# Patient Record
Sex: FEMALE | Race: WHITE | NOT HISPANIC OR LATINO
[De-identification: names, ages, dates, MRNs, and addresses within clinical notes are randomized per-mention and may not be internally consistent; named-entity substitution may affect disease eponyms.]

---

## 2020-08-11 PROBLEM — Z00.00 ENCOUNTER FOR PREVENTIVE HEALTH EXAMINATION: Status: ACTIVE | Noted: 2020-08-11

## 2020-08-19 ENCOUNTER — APPOINTMENT (OUTPATIENT)
Dept: UROLOGY | Facility: CLINIC | Age: 65
End: 2020-08-19
Payer: MEDICARE

## 2020-08-19 VITALS
TEMPERATURE: 97.3 F | HEIGHT: 66.5 IN | SYSTOLIC BLOOD PRESSURE: 133 MMHG | HEART RATE: 77 BPM | BODY MASS INDEX: 29.38 KG/M2 | OXYGEN SATURATION: 96 % | DIASTOLIC BLOOD PRESSURE: 90 MMHG | WEIGHT: 185 LBS

## 2020-08-19 DIAGNOSIS — R33.9 RETENTION OF URINE, UNSPECIFIED: ICD-10-CM

## 2020-08-19 DIAGNOSIS — Z78.9 OTHER SPECIFIED HEALTH STATUS: ICD-10-CM

## 2020-08-19 LAB
BILIRUB UR QL STRIP: NEGATIVE
CLARITY UR: CLEAR
COLLECTION METHOD: NORMAL
GLUCOSE UR-MCNC: NEGATIVE
HCG UR QL: 0.2 EU/DL
HGB UR QL STRIP.AUTO: NORMAL
KETONES UR-MCNC: NEGATIVE
LEUKOCYTE ESTERASE UR QL STRIP: NEGATIVE
NITRITE UR QL STRIP: NEGATIVE
PH UR STRIP: 5.5
PROT UR STRIP-MCNC: NEGATIVE
SP GR UR STRIP: 1.02

## 2020-08-19 PROCEDURE — 81003 URINALYSIS AUTO W/O SCOPE: CPT | Mod: QW

## 2020-08-19 PROCEDURE — 51798 US URINE CAPACITY MEASURE: CPT

## 2020-08-19 PROCEDURE — 51701 INSERT BLADDER CATHETER: CPT

## 2020-08-19 PROCEDURE — 99204 OFFICE O/P NEW MOD 45 MIN: CPT | Mod: 25

## 2020-08-20 LAB
APPEARANCE: CLEAR
BACTERIA: NEGATIVE
BILIRUBIN URINE: NEGATIVE
BLOOD URINE: NEGATIVE
COLOR: NORMAL
GLUCOSE QUALITATIVE U: NEGATIVE
HYALINE CASTS: 0 /LPF
KETONES URINE: NEGATIVE
LEUKOCYTE ESTERASE URINE: NEGATIVE
MICROSCOPIC-UA: NORMAL
NITRITE URINE: NEGATIVE
PH URINE: 6
PROTEIN URINE: NEGATIVE
RED BLOOD CELLS URINE: 3 /HPF
SPECIFIC GRAVITY URINE: 1.02
SQUAMOUS EPITHELIAL CELLS: 1 /HPF
UROBILINOGEN URINE: NORMAL
WHITE BLOOD CELLS URINE: 1 /HPF

## 2020-08-21 LAB — BACTERIA UR CULT: NORMAL

## 2020-08-24 ENCOUNTER — NON-APPOINTMENT (OUTPATIENT)
Age: 65
End: 2020-08-24

## 2020-08-24 ENCOUNTER — APPOINTMENT (OUTPATIENT)
Dept: OBGYN | Facility: CLINIC | Age: 65
End: 2020-08-24
Payer: MEDICARE

## 2020-08-24 DIAGNOSIS — R19.00 INTRA-ABDOMINAL AND PELVIC SWELLING, MASS AND LUMP, UNSPECIFIED SITE: ICD-10-CM

## 2020-08-24 PROCEDURE — 99203 OFFICE O/P NEW LOW 30 MIN: CPT

## 2020-08-24 NOTE — PHYSICAL EXAM
[Awake] : awake [Alert] : alert [Acute Distress] : no acute distress [Nipple Discharge] : no nipple discharge [Mass] : no breast mass [Soft] : soft [Axillary LAD] : no axillary lymphadenopathy [Tender] : non tender [Oriented x3] : oriented to person, place, and time [Normal] : vagina [No Bleeding] : there was no active vaginal bleeding [No Tenderness] : no rectal tenderness [Uterine Adnexae] : was normal on the left [___] : a [unfilled] ~Ucm mass was palpated in the right adnexa [Occult Blood] : occult blood test from digital rectal exam was negative [Nl Sphincter Tone] : normal sphincter tone [FreeTextEntry7] : pelvic exam reveals a large rightcystic adnexal mass mobilenontender

## 2020-08-24 NOTE — CHIEF COMPLAINT
[Initial Visit] : initial GYN visit [FreeTextEntry1] : Patient is age 64-year-old female who presents for an urgent visit. Patient was seen by urologist for urinary symptoms and underwent a pelvic ultrasound in the office which revealed a cystic pelvic mass. Patient had a CAT scan and a CAT scan revealed a right sided pelvic cystic mass measuring 14.3 x 9 x 10.3 cm with the suggestion of a thin septation. Otherwise within normal limits. Discussed this finding with the patient and her daughter. Advised a complete exam today and to have a pelvic MRI and ova1 blood test and then determine further treatment pending these test results. Discussed the issue of possible malignancy. However other than the urinary symptoms the patient is asymptomatic. Patient denies any abnormal vaginal bleeding or discharge. Patient's last gynecologic exam was 2 years ago. Patient's last mammogram one year ago.

## 2020-08-25 ENCOUNTER — TRANSCRIPTION ENCOUNTER (OUTPATIENT)
Age: 65
End: 2020-08-25

## 2020-08-25 ENCOUNTER — OUTPATIENT (OUTPATIENT)
Dept: OUTPATIENT SERVICES | Facility: HOSPITAL | Age: 65
LOS: 1 days | End: 2020-08-25
Payer: MEDICARE

## 2020-08-25 ENCOUNTER — APPOINTMENT (OUTPATIENT)
Dept: MRI IMAGING | Facility: CLINIC | Age: 65
End: 2020-08-25
Payer: MEDICARE

## 2020-08-25 DIAGNOSIS — R19.00 INTRA-ABDOMINAL AND PELVIC SWELLING, MASS AND LUMP, UNSPECIFIED SITE: ICD-10-CM

## 2020-08-25 LAB
ANION GAP SERPL CALC-SCNC: 17 MMOL/L
BASOPHILS # BLD AUTO: 0.05 K/UL
BASOPHILS NFR BLD AUTO: 0.7 %
BUN SERPL-MCNC: 25 MG/DL
CALCIUM SERPL-MCNC: 10.1 MG/DL
CHLORIDE SERPL-SCNC: 100 MMOL/L
CO2 SERPL-SCNC: 25 MMOL/L
CREAT SERPL-MCNC: 0.75 MG/DL
EOSINOPHIL # BLD AUTO: 0.2 K/UL
EOSINOPHIL NFR BLD AUTO: 2.9 %
GLUCOSE SERPL-MCNC: 65 MG/DL
HCT VFR BLD CALC: 50 %
HGB BLD-MCNC: 15.6 G/DL
IMM GRANULOCYTES NFR BLD AUTO: 0.3 %
LYMPHOCYTES # BLD AUTO: 1.87 K/UL
LYMPHOCYTES NFR BLD AUTO: 26.8 %
MAN DIFF?: NORMAL
MCHC RBC-ENTMCNC: 30.4 PG
MCHC RBC-ENTMCNC: 31.2 GM/DL
MCV RBC AUTO: 97.5 FL
MONOCYTES # BLD AUTO: 0.64 K/UL
MONOCYTES NFR BLD AUTO: 9.2 %
NEUTROPHILS # BLD AUTO: 4.19 K/UL
NEUTROPHILS NFR BLD AUTO: 60.1 %
PLATELET # BLD AUTO: 193 K/UL
POTASSIUM SERPL-SCNC: 3.5 MMOL/L
RBC # BLD: 5.13 M/UL
RBC # FLD: 12.8 %
SODIUM SERPL-SCNC: 143 MMOL/L
WBC # FLD AUTO: 6.97 K/UL

## 2020-08-25 PROCEDURE — 72197 MRI PELVIS W/O & W/DYE: CPT | Mod: 26

## 2020-08-25 PROCEDURE — 72197 MRI PELVIS W/O & W/DYE: CPT

## 2020-08-30 PROBLEM — Z78.9 NO PERTINENT PAST MEDICAL HISTORY: Status: RESOLVED | Noted: 2020-08-30 | Resolved: 2020-08-30

## 2020-08-30 NOTE — ASSESSMENT
[FreeTextEntry1] : Urinary incontinence:\par Incomplete bladder emptying:\par Elevated PVR but on clean intermittent catheterization 90 ml. \par Will get Urinalysis and Urine culture.\par Will get Renal and Bladder Ultrasound.\par Will inform results.

## 2020-08-30 NOTE — PHYSICAL EXAM
[General Appearance - Well Developed] : well developed [Normal Appearance] : normal appearance [General Appearance - In No Acute Distress] : no acute distress [] : no respiratory distress [Abdomen Soft] : soft [Abdomen Tenderness] : non-tender [Abdomen Mass (___ Cm)] : no abdominal mass palpated [Costovertebral Angle Tenderness] : no ~M costovertebral angle tenderness [Urethral Meatus] : the meatus of the urethra showed no abnormalities [Atrophy] : atrophy [FreeTextEntry1] : Cystocele grade II [Normal Station and Gait] : the gait and station were normal for the patient's age [Skin Color & Pigmentation] : normal skin color and pigmentation [No Focal Deficits] : no focal deficits [Oriented To Time, Place, And Person] : oriented to person, place, and time

## 2020-08-30 NOTE — HISTORY OF PRESENT ILLNESS
[FreeTextEntry1] : 65 yo female presents for urinary incontinence. \par For last 6 months has noticed worsening of urination. \par Normal daytime frequency is every 2-3 hours or so. Nocturia of 1-2 x. \par Endorses urgency, urge incontinence, incontinence, hesitancy, straining and sense of incomplete emptying. \par Denies dysuria, hematuria, lower abdominal or flank pain, fever, chills or rigors.\par

## 2020-08-30 NOTE — REVIEW OF SYSTEMS
[see HPI] : see HPI [Wake up at night to urinate  How many times?  ___] : wakes up to urinate [unfilled] times during the night [Strain or push to urinate] : strain or push to urinate [Slow urine stream] : slow urine stream [Leakage of urine with straining, coughing, laughing] : leakage of urine with straining, coughing, laughing [Depression] : depression [Negative] : Heme/Lymph [FreeTextEntry6] : frequent urination [FreeTextEntry3] : leak urine

## 2020-09-10 LAB
EER MALIGNANCY ASSESSMENT, OVA1 PLUS: NORMAL
MALIGNANCY ASSESSMENT, CA 125 II: NORMAL
MALIGNANCY ASSESSMENT, MENOPAUSAL STATUS: NORMAL
MALIGNANCY ASSESSMENT, OVA1 SCORE: 3.5

## 2020-09-23 ENCOUNTER — APPOINTMENT (OUTPATIENT)
Dept: OBGYN | Facility: CLINIC | Age: 65
End: 2020-09-23
Payer: MEDICARE

## 2020-09-23 VITALS
BODY MASS INDEX: 29.38 KG/M2 | SYSTOLIC BLOOD PRESSURE: 120 MMHG | HEIGHT: 66.5 IN | WEIGHT: 185 LBS | TEMPERATURE: 97.6 F | RESPIRATION RATE: 16 BRPM | DIASTOLIC BLOOD PRESSURE: 78 MMHG

## 2020-09-23 DIAGNOSIS — Z11.59 ENCOUNTER FOR SCREENING FOR OTHER VIRAL DISEASES: ICD-10-CM

## 2020-09-23 DIAGNOSIS — N83.291 OTHER OVARIAN CYST, RIGHT SIDE: ICD-10-CM

## 2020-09-23 PROCEDURE — 99215 OFFICE O/P EST HI 40 MIN: CPT

## 2020-09-23 RX ORDER — ALPRAZOLAM 2 MG/1
TABLET ORAL
Refills: 0 | Status: ACTIVE | COMMUNITY

## 2020-09-23 RX ORDER — CITALOPRAM 40 MG/1
40 TABLET, FILM COATED ORAL
Refills: 0 | Status: ACTIVE | COMMUNITY

## 2020-09-23 NOTE — HISTORY OF PRESENT ILLNESS
[FreeTextEntry1] : Patient is a 64-year-old female presents for a consultation to discuss her test results and treatment recommendations. Patient was seen by a urologist with complaints of urinary symptoms and had a abdominal and pelvic CAT scan which revealed a large right cystic pelvic mass. Patient subsequently underwent a pelvic MRI which revealed a 14 x 10 cm unilocular cystic mass without any debris nodularity septation or solid components. Patient's ova-1 plus was normal low potential for malignancy. Discussed these results with the patient in detail. [facility] recent Pap smear was negative. In view of the findings of this large simple cyst the patient was advised to undergo an exploratory laparotomy and bilateral salpingectomy and supracervical hysterectomy. Risks benefits alternatives to discuss with the patient in detail. Patient states that she understands as all questions answered and agrees and desires postsurgery. Patient states that she does have obstructive sleep apnea and was advised to have medical clearance by pulmonary medicine. Patient to schedule surgery.\par \par 40 minutes of face time

## 2020-09-25 ENCOUNTER — LABORATORY RESULT (OUTPATIENT)
Age: 65
End: 2020-09-25

## 2020-09-30 ENCOUNTER — APPOINTMENT (OUTPATIENT)
Dept: INTERNAL MEDICINE | Facility: CLINIC | Age: 65
End: 2020-09-30
Payer: MEDICARE

## 2020-09-30 VITALS
SYSTOLIC BLOOD PRESSURE: 130 MMHG | WEIGHT: 188 LBS | BODY MASS INDEX: 29.86 KG/M2 | HEART RATE: 73 BPM | DIASTOLIC BLOOD PRESSURE: 80 MMHG | HEIGHT: 66.5 IN | RESPIRATION RATE: 16 BRPM | TEMPERATURE: 98.6 F | OXYGEN SATURATION: 97 %

## 2020-09-30 DIAGNOSIS — G47.33 OBSTRUCTIVE SLEEP APNEA (ADULT) (PEDIATRIC): ICD-10-CM

## 2020-09-30 DIAGNOSIS — E66.3 OVERWEIGHT: ICD-10-CM

## 2020-09-30 PROCEDURE — ZZZZZ: CPT

## 2020-09-30 PROCEDURE — 94727 GAS DIL/WSHOT DETER LNG VOL: CPT

## 2020-09-30 PROCEDURE — 94729 DIFFUSING CAPACITY: CPT

## 2020-09-30 PROCEDURE — 99204 OFFICE O/P NEW MOD 45 MIN: CPT | Mod: 25

## 2020-09-30 PROCEDURE — 94010 BREATHING CAPACITY TEST: CPT

## 2020-09-30 NOTE — ASSESSMENT
[FreeTextEntry1] : #1  Obstructive sleep apnea. HILLARY will continue strict weight reduction efforts, sleep on the sides, and avoid alcohol and sedating medicines. The patient knows fully not to drive or work with machinery if having any sleepiness or tiredness.  I am ordering for him her to have a home sleep study. \par \par #2 Anxiety/depression.\par \par #3 Pelvic mass. \par \par The patient is at acceptable risk from the pulmonary standpoint to proceed with her important planned gynecologic surgery.  I recommend close monitoring of the patient intraoperatively and postoperatively.  I also recommend having oxygen and either CPAP or BiPAP available postoperatively, given the patient's history of obstructive sleep apnea.

## 2020-09-30 NOTE — PHYSICAL EXAM
[No Acute Distress] : no acute distress [Normal Oropharynx] : normal oropharynx [Normal Appearance] : normal appearance [No Neck Mass] : no neck mass [Normal Rate/Rhythm] : normal rate/rhythm [Normal S1, S2] : normal s1, s2 [No Murmurs] : no murmurs [No Resp Distress] : no resp distress [Clear to Auscultation Bilaterally] : clear to auscultation bilaterally [No Abnormalities] : no abnormalities [Benign] : benign [Normal Gait] : normal gait [No Clubbing] : no clubbing [No Cyanosis] : no cyanosis [No Edema] : no edema [Normal Color/ Pigmentation] : normal color/ pigmentation [No Focal Deficits] : no focal deficits [Oriented x3] : oriented x3 [Normal Affect] : normal affect

## 2020-09-30 NOTE — PROCEDURE
[FreeTextEntry1] : Full pulmonary function testing today is within normal limits with an FEV1 of 2.34 or 92% predicted.  Diffusing capacity is normal.

## 2020-09-30 NOTE — HISTORY OF PRESENT ILLNESS
[TextBox_4] : The initial pulmonary appointment for this pleasant 64-year-old female who is planned to have gynecologic surgery, MARIETTA/BSO, in the future.  She has a history of obstructive sleep apnea and had her last sleep study about 20 years ago.  She did not tolerate CPAP treatment.  She did have surgery with tonsillectomies and uvulectomy about 10 years ago.  She denies having excessive daytime somnolence.  She does take an occasional nap.  She denies awakening with morning headaches.  She in general awakens in the morning feeling refreshed.\par \par She is a non-smoker and has an occasional alcoholic drink, about 1 per evening.\par \par She has no history of coughing, wheezing, or dyspnea on exertion.  She is not having chest pain, palpitations, lightheadedness, or syncope.  She denies recent fever or chills.\par \par Previous surgeries include knee replacements in 2015 and 2017.  Tonsillectomy and uvulectomy 2010.  There is no history of  problems or complications with these anesthesias.

## 2020-10-16 ENCOUNTER — RESULT REVIEW (OUTPATIENT)
Age: 65
End: 2020-10-16

## 2020-10-16 ENCOUNTER — OUTPATIENT (OUTPATIENT)
Dept: OUTPATIENT SERVICES | Facility: HOSPITAL | Age: 65
LOS: 1 days | End: 2020-10-16
Payer: MEDICARE

## 2020-10-16 VITALS
WEIGHT: 188.05 LBS | RESPIRATION RATE: 16 BRPM | OXYGEN SATURATION: 95 % | DIASTOLIC BLOOD PRESSURE: 84 MMHG | HEIGHT: 65 IN | HEART RATE: 70 BPM | TEMPERATURE: 98 F | SYSTOLIC BLOOD PRESSURE: 130 MMHG

## 2020-10-16 DIAGNOSIS — Z01.818 ENCOUNTER FOR OTHER PREPROCEDURAL EXAMINATION: ICD-10-CM

## 2020-10-16 DIAGNOSIS — Z29.9 ENCOUNTER FOR PROPHYLACTIC MEASURES, UNSPECIFIED: ICD-10-CM

## 2020-10-16 DIAGNOSIS — Z98.890 OTHER SPECIFIED POSTPROCEDURAL STATES: Chronic | ICD-10-CM

## 2020-10-16 DIAGNOSIS — N83.291 OTHER OVARIAN CYST, RIGHT SIDE: ICD-10-CM

## 2020-10-16 DIAGNOSIS — Z96.659 PRESENCE OF UNSPECIFIED ARTIFICIAL KNEE JOINT: Chronic | ICD-10-CM

## 2020-10-16 LAB
A1C WITH ESTIMATED AVERAGE GLUCOSE RESULT: 5.4 % — SIGNIFICANT CHANGE UP (ref 4–5.6)
ANION GAP SERPL CALC-SCNC: 3 MMOL/L — LOW (ref 5–17)
APPEARANCE UR: CLEAR — SIGNIFICANT CHANGE UP
APTT BLD: 32.5 SEC — SIGNIFICANT CHANGE UP (ref 27.5–35.5)
BASOPHILS # BLD AUTO: 0.03 K/UL — SIGNIFICANT CHANGE UP (ref 0–0.2)
BASOPHILS NFR BLD AUTO: 0.7 % — SIGNIFICANT CHANGE UP (ref 0–2)
BILIRUB UR-MCNC: NEGATIVE — SIGNIFICANT CHANGE UP
BUN SERPL-MCNC: 19 MG/DL — SIGNIFICANT CHANGE UP (ref 7–23)
CALCIUM SERPL-MCNC: 9.3 MG/DL — SIGNIFICANT CHANGE UP (ref 8.5–10.1)
CHLORIDE SERPL-SCNC: 110 MMOL/L — HIGH (ref 96–108)
CO2 SERPL-SCNC: 27 MMOL/L — SIGNIFICANT CHANGE UP (ref 22–31)
COLOR SPEC: YELLOW — SIGNIFICANT CHANGE UP
CREAT SERPL-MCNC: 0.84 MG/DL — SIGNIFICANT CHANGE UP (ref 0.5–1.3)
DIFF PNL FLD: ABNORMAL
EOSINOPHIL # BLD AUTO: 0.27 K/UL — SIGNIFICANT CHANGE UP (ref 0–0.5)
EOSINOPHIL NFR BLD AUTO: 6.1 % — HIGH (ref 0–6)
ESTIMATED AVERAGE GLUCOSE: 108 MG/DL — SIGNIFICANT CHANGE UP (ref 68–114)
GLUCOSE SERPL-MCNC: 96 MG/DL — SIGNIFICANT CHANGE UP (ref 70–99)
GLUCOSE UR QL: NEGATIVE MG/DL — SIGNIFICANT CHANGE UP
HCT VFR BLD CALC: 47.6 % — HIGH (ref 34.5–45)
HGB BLD-MCNC: 15.8 G/DL — HIGH (ref 11.5–15.5)
IMM GRANULOCYTES NFR BLD AUTO: 0.2 % — SIGNIFICANT CHANGE UP (ref 0–1.5)
INR BLD: 1.02 RATIO — SIGNIFICANT CHANGE UP (ref 0.88–1.16)
KETONES UR-MCNC: NEGATIVE — SIGNIFICANT CHANGE UP
LEUKOCYTE ESTERASE UR-ACNC: ABNORMAL
LYMPHOCYTES # BLD AUTO: 1.38 K/UL — SIGNIFICANT CHANGE UP (ref 1–3.3)
LYMPHOCYTES # BLD AUTO: 31.2 % — SIGNIFICANT CHANGE UP (ref 13–44)
MCHC RBC-ENTMCNC: 30.3 PG — SIGNIFICANT CHANGE UP (ref 27–34)
MCHC RBC-ENTMCNC: 33.2 GM/DL — SIGNIFICANT CHANGE UP (ref 32–36)
MCV RBC AUTO: 91.2 FL — SIGNIFICANT CHANGE UP (ref 80–100)
MONOCYTES # BLD AUTO: 0.43 K/UL — SIGNIFICANT CHANGE UP (ref 0–0.9)
MONOCYTES NFR BLD AUTO: 9.7 % — SIGNIFICANT CHANGE UP (ref 2–14)
NEUTROPHILS # BLD AUTO: 2.31 K/UL — SIGNIFICANT CHANGE UP (ref 1.8–7.4)
NEUTROPHILS NFR BLD AUTO: 52.1 % — SIGNIFICANT CHANGE UP (ref 43–77)
NITRITE UR-MCNC: NEGATIVE — SIGNIFICANT CHANGE UP
PH UR: 5 — SIGNIFICANT CHANGE UP (ref 5–8)
PLATELET # BLD AUTO: 190 K/UL — SIGNIFICANT CHANGE UP (ref 150–400)
POTASSIUM SERPL-MCNC: 4.1 MMOL/L — SIGNIFICANT CHANGE UP (ref 3.5–5.3)
POTASSIUM SERPL-SCNC: 4.1 MMOL/L — SIGNIFICANT CHANGE UP (ref 3.5–5.3)
PROT UR-MCNC: NEGATIVE MG/DL — SIGNIFICANT CHANGE UP
PROTHROM AB SERPL-ACNC: 11.9 SEC — SIGNIFICANT CHANGE UP (ref 10.6–13.6)
RBC # BLD: 5.22 M/UL — HIGH (ref 3.8–5.2)
RBC # FLD: 11.9 % — SIGNIFICANT CHANGE UP (ref 10.3–14.5)
SODIUM SERPL-SCNC: 140 MMOL/L — SIGNIFICANT CHANGE UP (ref 135–145)
SP GR SPEC: 1.02 — SIGNIFICANT CHANGE UP (ref 1.01–1.02)
UROBILINOGEN FLD QL: NEGATIVE MG/DL — SIGNIFICANT CHANGE UP
WBC # BLD: 4.43 K/UL — SIGNIFICANT CHANGE UP (ref 3.8–10.5)
WBC # FLD AUTO: 4.43 K/UL — SIGNIFICANT CHANGE UP (ref 3.8–10.5)

## 2020-10-16 PROCEDURE — 85025 COMPLETE CBC W/AUTO DIFF WBC: CPT

## 2020-10-16 PROCEDURE — G0463: CPT | Mod: 25

## 2020-10-16 PROCEDURE — 86900 BLOOD TYPING SEROLOGIC ABO: CPT

## 2020-10-16 PROCEDURE — 71046 X-RAY EXAM CHEST 2 VIEWS: CPT | Mod: 26

## 2020-10-16 PROCEDURE — 85610 PROTHROMBIN TIME: CPT

## 2020-10-16 PROCEDURE — 81001 URINALYSIS AUTO W/SCOPE: CPT

## 2020-10-16 PROCEDURE — 36415 COLL VENOUS BLD VENIPUNCTURE: CPT

## 2020-10-16 PROCEDURE — 93010 ELECTROCARDIOGRAM REPORT: CPT

## 2020-10-16 PROCEDURE — 93005 ELECTROCARDIOGRAM TRACING: CPT

## 2020-10-16 PROCEDURE — 83036 HEMOGLOBIN GLYCOSYLATED A1C: CPT

## 2020-10-16 PROCEDURE — 71046 X-RAY EXAM CHEST 2 VIEWS: CPT

## 2020-10-16 PROCEDURE — 86901 BLOOD TYPING SEROLOGIC RH(D): CPT

## 2020-10-16 PROCEDURE — 86850 RBC ANTIBODY SCREEN: CPT

## 2020-10-16 PROCEDURE — 85730 THROMBOPLASTIN TIME PARTIAL: CPT

## 2020-10-16 PROCEDURE — 80048 BASIC METABOLIC PNL TOTAL CA: CPT

## 2020-10-16 RX ORDER — ALPRAZOLAM 0.25 MG
1 TABLET ORAL
Qty: 0 | Refills: 0 | DISCHARGE

## 2020-10-16 RX ORDER — CITALOPRAM 10 MG/1
1 TABLET, FILM COATED ORAL
Qty: 0 | Refills: 0 | DISCHARGE

## 2020-10-16 NOTE — H&P PST ADULT - NSICDXPASTMEDICALHX_GEN_ALL_CORE_FT
PAST MEDICAL HISTORY:  Anxiety and depression     Incomplete bladder emptying     Osteoarthritis of both knees bilateral replacement    Osteoarthritis of right shoulder     Osteopenia     RBBB history of  "for 30 years"    Right ovarian cyst     Sleep apnea, obstructive     Urinary incontinence

## 2020-10-16 NOTE — H&P PST ADULT - ASSESSMENT
65 years old female present to PST prior to abdominal supracervical hysterectomy, bilateral salpingo oophorectomy, possible total hysterectomy with Dr. De La Cruz.    Plan   1. NPO after midnight  2. Take the following medications with sips of water on the day of procedure: may take Xanax  3. Use E-Z sponge as directed  4. Drink a quart of extra  fluids the day before your surgery.  5. Pulmonologist evaluation  with Dr. Donovan  6. CBC, BMP, HGB AIC, PT/ INR and PTT, Urinalysis, Type and Screen sent to lab  7. EKG and Chest x- ray done  8. Covid swab 10/19/2020 followed by self quarantine    CAPRINI SCORE [CLOT]    AGE RELATED RISK FACTORS                                                       MOBILITY RELATED FACTORS  [ ] Age 41-60 years                                            (1 Point)                  [ ] Bed rest                                                        (1 Point)  [x ] Age: 61-74 years                                           (2 Points)                 [ ] Plaster cast                                                   (2 Points)  [ ] Age> 75 years                                              (3 Points)                 [ ] Bed bound for more than 72 hours                 (2 Points)    DISEASE RELATED RISK FACTORS                                               GENDER SPECIFIC FACTORS  [ ] Edema in the lower extremities                       (1 Point)                  [ ] Pregnancy                                                     (1 Point)  [ ] Varicose veins                                               (1 Point)                  [ ] Post-partum < 6 weeks                                   (1 Point)             [x ] BMI > 25 Kg/m2                                            (1 Point)                  [ ] Hormonal therapy  or oral contraception          (1 Point)                 [ ] Sepsis (in the previous month)                        (1 Point)                  [ ] History of pregnancy complications                 (1 point)  [ ] Pneumonia or serious lung disease                                               [ ] Unexplained or recurrent                     (1 Point)           (in the previous month)                               (1 Point)  [ ] Abnormal pulmonary function test                     (1 Point)                 SURGERY RELATED RISK FACTORS  [ ] Acute myocardial infarction                              (1 Point)                 [ ]  Section                                             (1 Point)  [ ] Congestive heart failure (in the previous month)  (1 Point)               [ ] Minor surgery                                                  (1 Point)   [ ] Inflammatory bowel disease                             (1 Point)                 [ ] Arthroscopic surgery                                        (2 Points)  [ ] Central venous access                                      (2 Points)                [x ] General surgery lasting more than 45 minutes   (2 Points)       [ ] Stroke (in the previous month)                          (5 Points)               [ ] Elective arthroplasty                                         (5 Points)            [ ] malignancy present or previous                      (2 points)                                                                                                                                   HEMATOLOGY RELATED FACTORS                                                 TRAUMA RELATED RISK FACTORS  [ ] Prior episodes of VTE                                     (3 Points)                 [ ] Fracture of the hip, pelvis, or leg                       (5 Points)  [ ] Positive family history for VTE                         (3 Points)                 [ ] Acute spinal cord injury (in the previous month)  (5 Points)  [ ] Prothrombin 75920 A                                     (3 Points)                 [ ] Paralysis  (less than 1 month)                             (5 Points)  [ ] Factor V Leiden                                             (3 Points)                  [ ] Multiple Trauma within 1 month                        (5 Points)  [ ] Lupus anticoagulants                                     (3 Points)                                                           [ ] Anticardiolipin antibodies                               (3 Points)                                                       [ ] High homocysteine in the blood                      (3 Points)                                             [ ] Other congenital or acquired thrombophilia      (3 Points)                                                [ ] Heparin induced thrombocytopenia                  (3 Points)                                          Total Score [      5    ]

## 2020-10-16 NOTE — H&P PST ADULT - NSICDXPASTSURGICALHX_GEN_ALL_CORE_FT
PAST SURGICAL HISTORY:  S/P knee replacement left and right    S/P UPPP (uvulopalatopharyngoplasty) 2014 with tonsillectomy

## 2020-10-16 NOTE — H&P PST ADULT - HISTORY OF PRESENT ILLNESS
65 years old female with right side ovarian cyst. Seen by Urologist and sonogram done for urinary incontinence. Incidental finding of right ovarian cyst. Referred to Dr. De La Cruz. "It is very large." Denies abdominal or pelvic pain. Denies postmenopausal bleed.  Planned abdominal hysterectomy.

## 2020-10-16 NOTE — H&P PST ADULT - HEALTH CARE MAINTENANCE
Denies travel outside of state or country in the last 14 days.   Denies contact with known Coronavirus positive person.  Denies fever, chills, cough, congestion, runny nose, SOB or difficulty breathing, fatigue, muscle or body ache, headache. loss of taste or smell, N/V/D.    Scheduled for Influenza vaccine at Pharmacy.

## 2020-10-17 DIAGNOSIS — N83.291 OTHER OVARIAN CYST, RIGHT SIDE: ICD-10-CM

## 2020-10-17 DIAGNOSIS — Z01.818 ENCOUNTER FOR OTHER PREPROCEDURAL EXAMINATION: ICD-10-CM

## 2020-10-19 ENCOUNTER — OUTPATIENT (OUTPATIENT)
Dept: OUTPATIENT SERVICES | Facility: HOSPITAL | Age: 65
LOS: 1 days | End: 2020-10-19

## 2020-10-19 DIAGNOSIS — Z11.59 ENCOUNTER FOR SCREENING FOR OTHER VIRAL DISEASES: ICD-10-CM

## 2020-10-19 DIAGNOSIS — Z96.659 PRESENCE OF UNSPECIFIED ARTIFICIAL KNEE JOINT: Chronic | ICD-10-CM

## 2020-10-19 DIAGNOSIS — Z98.890 OTHER SPECIFIED POSTPROCEDURAL STATES: Chronic | ICD-10-CM

## 2020-10-19 LAB — SARS-COV-2 RNA SPEC QL NAA+PROBE: SIGNIFICANT CHANGE UP

## 2020-10-20 DIAGNOSIS — Z11.59 ENCOUNTER FOR SCREENING FOR OTHER VIRAL DISEASES: ICD-10-CM

## 2020-10-22 ENCOUNTER — INPATIENT (INPATIENT)
Facility: HOSPITAL | Age: 65
LOS: 1 days | Discharge: HOME | DRG: 743 | End: 2020-10-24
Attending: OBSTETRICS & GYNECOLOGY | Admitting: OBSTETRICS & GYNECOLOGY
Payer: MEDICARE

## 2020-10-22 ENCOUNTER — RESULT REVIEW (OUTPATIENT)
Age: 65
End: 2020-10-22

## 2020-10-22 VITALS
SYSTOLIC BLOOD PRESSURE: 133 MMHG | OXYGEN SATURATION: 97 % | DIASTOLIC BLOOD PRESSURE: 91 MMHG | TEMPERATURE: 98 F | RESPIRATION RATE: 18 BRPM | HEIGHT: 65 IN | HEART RATE: 73 BPM | WEIGHT: 188.05 LBS

## 2020-10-22 DIAGNOSIS — I45.10 UNSPECIFIED RIGHT BUNDLE-BRANCH BLOCK: ICD-10-CM

## 2020-10-22 DIAGNOSIS — Z23 ENCOUNTER FOR IMMUNIZATION: ICD-10-CM

## 2020-10-22 DIAGNOSIS — F32.9 MAJOR DEPRESSIVE DISORDER, SINGLE EPISODE, UNSPECIFIED: ICD-10-CM

## 2020-10-22 DIAGNOSIS — Z96.653 PRESENCE OF ARTIFICIAL KNEE JOINT, BILATERAL: ICD-10-CM

## 2020-10-22 DIAGNOSIS — N83.291 OTHER OVARIAN CYST, RIGHT SIDE: ICD-10-CM

## 2020-10-22 DIAGNOSIS — N83.201 UNSPECIFIED OVARIAN CYST, RIGHT SIDE: ICD-10-CM

## 2020-10-22 DIAGNOSIS — F41.9 ANXIETY DISORDER, UNSPECIFIED: ICD-10-CM

## 2020-10-22 DIAGNOSIS — Z98.890 OTHER SPECIFIED POSTPROCEDURAL STATES: Chronic | ICD-10-CM

## 2020-10-22 DIAGNOSIS — Z96.659 PRESENCE OF UNSPECIFIED ARTIFICIAL KNEE JOINT: Chronic | ICD-10-CM

## 2020-10-22 LAB
ANION GAP SERPL CALC-SCNC: 2 MMOL/L — LOW (ref 5–17)
BASOPHILS # BLD AUTO: 0.02 K/UL — SIGNIFICANT CHANGE UP (ref 0–0.2)
BASOPHILS NFR BLD AUTO: 0.2 % — SIGNIFICANT CHANGE UP (ref 0–2)
BUN SERPL-MCNC: 17 MG/DL — SIGNIFICANT CHANGE UP (ref 7–23)
CALCIUM SERPL-MCNC: 8.6 MG/DL — SIGNIFICANT CHANGE UP (ref 8.5–10.1)
CHLORIDE SERPL-SCNC: 109 MMOL/L — HIGH (ref 96–108)
CO2 SERPL-SCNC: 29 MMOL/L — SIGNIFICANT CHANGE UP (ref 22–31)
CREAT SERPL-MCNC: 0.79 MG/DL — SIGNIFICANT CHANGE UP (ref 0.5–1.3)
EOSINOPHIL # BLD AUTO: 0.06 K/UL — SIGNIFICANT CHANGE UP (ref 0–0.5)
EOSINOPHIL NFR BLD AUTO: 0.6 % — SIGNIFICANT CHANGE UP (ref 0–6)
GLUCOSE SERPL-MCNC: 126 MG/DL — HIGH (ref 70–99)
HCT VFR BLD CALC: 40.4 % — SIGNIFICANT CHANGE UP (ref 34.5–45)
HGB BLD-MCNC: 13.4 G/DL — SIGNIFICANT CHANGE UP (ref 11.5–15.5)
IMM GRANULOCYTES NFR BLD AUTO: 0.2 % — SIGNIFICANT CHANGE UP (ref 0–1.5)
LYMPHOCYTES # BLD AUTO: 0.7 K/UL — LOW (ref 1–3.3)
LYMPHOCYTES # BLD AUTO: 7.5 % — LOW (ref 13–44)
MCHC RBC-ENTMCNC: 30.4 PG — SIGNIFICANT CHANGE UP (ref 27–34)
MCHC RBC-ENTMCNC: 33.2 GM/DL — SIGNIFICANT CHANGE UP (ref 32–36)
MCV RBC AUTO: 91.6 FL — SIGNIFICANT CHANGE UP (ref 80–100)
MONOCYTES # BLD AUTO: 0.18 K/UL — SIGNIFICANT CHANGE UP (ref 0–0.9)
MONOCYTES NFR BLD AUTO: 1.9 % — LOW (ref 2–14)
NEUTROPHILS # BLD AUTO: 8.37 K/UL — HIGH (ref 1.8–7.4)
NEUTROPHILS NFR BLD AUTO: 89.6 % — HIGH (ref 43–77)
PLATELET # BLD AUTO: 164 K/UL — SIGNIFICANT CHANGE UP (ref 150–400)
POTASSIUM SERPL-MCNC: 4.1 MMOL/L — SIGNIFICANT CHANGE UP (ref 3.5–5.3)
POTASSIUM SERPL-SCNC: 4.1 MMOL/L — SIGNIFICANT CHANGE UP (ref 3.5–5.3)
RBC # BLD: 4.41 M/UL — SIGNIFICANT CHANGE UP (ref 3.8–5.2)
RBC # FLD: 12 % — SIGNIFICANT CHANGE UP (ref 10.3–14.5)
SODIUM SERPL-SCNC: 140 MMOL/L — SIGNIFICANT CHANGE UP (ref 135–145)
WBC # BLD: 9.35 K/UL — SIGNIFICANT CHANGE UP (ref 3.8–10.5)
WBC # FLD AUTO: 9.35 K/UL — SIGNIFICANT CHANGE UP (ref 3.8–10.5)

## 2020-10-22 PROCEDURE — ZZZZZ: CPT

## 2020-10-22 PROCEDURE — 82962 GLUCOSE BLOOD TEST: CPT

## 2020-10-22 PROCEDURE — C9290: CPT

## 2020-10-22 PROCEDURE — 88307 TISSUE EXAM BY PATHOLOGIST: CPT | Mod: 26

## 2020-10-22 PROCEDURE — 36415 COLL VENOUS BLD VENIPUNCTURE: CPT

## 2020-10-22 PROCEDURE — 85027 COMPLETE CBC AUTOMATED: CPT

## 2020-10-22 PROCEDURE — 88104 CYTOPATH FL NONGYN SMEARS: CPT

## 2020-10-22 PROCEDURE — 58180 PARTIAL HYSTERECTOMY: CPT

## 2020-10-22 PROCEDURE — 88307 TISSUE EXAM BY PATHOLOGIST: CPT

## 2020-10-22 PROCEDURE — 80048 BASIC METABOLIC PNL TOTAL CA: CPT

## 2020-10-22 PROCEDURE — 88305 TISSUE EXAM BY PATHOLOGIST: CPT | Mod: 26

## 2020-10-22 PROCEDURE — C1889: CPT

## 2020-10-22 PROCEDURE — 85025 COMPLETE CBC W/AUTO DIFF WBC: CPT

## 2020-10-22 PROCEDURE — 88305 TISSUE EXAM BY PATHOLOGIST: CPT

## 2020-10-22 PROCEDURE — 88104 CYTOPATH FL NONGYN SMEARS: CPT | Mod: 26

## 2020-10-22 RX ORDER — ONDANSETRON 8 MG/1
4 TABLET, FILM COATED ORAL ONCE
Refills: 0 | Status: DISCONTINUED | OUTPATIENT
Start: 2020-10-22 | End: 2020-10-23

## 2020-10-22 RX ORDER — ONDANSETRON 8 MG/1
4 TABLET, FILM COATED ORAL ONCE
Refills: 0 | Status: DISCONTINUED | OUTPATIENT
Start: 2020-10-22 | End: 2020-10-22

## 2020-10-22 RX ORDER — SODIUM CHLORIDE 9 MG/ML
3 INJECTION INTRAMUSCULAR; INTRAVENOUS; SUBCUTANEOUS EVERY 8 HOURS
Refills: 0 | Status: DISCONTINUED | OUTPATIENT
Start: 2020-10-22 | End: 2020-10-24

## 2020-10-22 RX ORDER — ALPRAZOLAM 0.25 MG
0.5 TABLET ORAL AT BEDTIME
Refills: 0 | Status: DISCONTINUED | OUTPATIENT
Start: 2020-10-22 | End: 2020-10-24

## 2020-10-22 RX ORDER — ENOXAPARIN SODIUM 100 MG/ML
40 INJECTION SUBCUTANEOUS DAILY
Refills: 0 | Status: DISCONTINUED | OUTPATIENT
Start: 2020-10-23 | End: 2020-10-24

## 2020-10-22 RX ORDER — APREPITANT 80 MG/1
40 CAPSULE ORAL ONCE
Refills: 0 | Status: COMPLETED | OUTPATIENT
Start: 2020-10-22 | End: 2020-10-22

## 2020-10-22 RX ORDER — FAMOTIDINE 10 MG/ML
20 INJECTION INTRAVENOUS ONCE
Refills: 0 | Status: COMPLETED | OUTPATIENT
Start: 2020-10-22 | End: 2020-10-22

## 2020-10-22 RX ORDER — POLYETHYLENE GLYCOL 3350 17 G/17G
17 POWDER, FOR SOLUTION ORAL ONCE
Refills: 0 | Status: DISCONTINUED | OUTPATIENT
Start: 2020-10-22 | End: 2020-10-24

## 2020-10-22 RX ORDER — ONDANSETRON 8 MG/1
4 TABLET, FILM COATED ORAL EVERY 6 HOURS
Refills: 0 | Status: DISCONTINUED | OUTPATIENT
Start: 2020-10-22 | End: 2020-10-23

## 2020-10-22 RX ORDER — NALOXONE HYDROCHLORIDE 4 MG/.1ML
0.1 SPRAY NASAL
Refills: 0 | Status: DISCONTINUED | OUTPATIENT
Start: 2020-10-22 | End: 2020-10-24

## 2020-10-22 RX ORDER — HYDROMORPHONE HYDROCHLORIDE 2 MG/ML
0.5 INJECTION INTRAMUSCULAR; INTRAVENOUS; SUBCUTANEOUS
Refills: 0 | Status: DISCONTINUED | OUTPATIENT
Start: 2020-10-22 | End: 2020-10-22

## 2020-10-22 RX ORDER — SODIUM CHLORIDE 9 MG/ML
3 INJECTION INTRAMUSCULAR; INTRAVENOUS; SUBCUTANEOUS EVERY 8 HOURS
Refills: 0 | Status: DISCONTINUED | OUTPATIENT
Start: 2020-10-22 | End: 2020-10-22

## 2020-10-22 RX ORDER — FENTANYL CITRATE 50 UG/ML
50 INJECTION INTRAVENOUS
Refills: 0 | Status: DISCONTINUED | OUTPATIENT
Start: 2020-10-22 | End: 2020-10-22

## 2020-10-22 RX ORDER — SODIUM CHLORIDE 9 MG/ML
1000 INJECTION, SOLUTION INTRAVENOUS
Refills: 0 | Status: DISCONTINUED | OUTPATIENT
Start: 2020-10-22 | End: 2020-10-22

## 2020-10-22 RX ORDER — SCOPALAMINE 1 MG/3D
1 PATCH, EXTENDED RELEASE TRANSDERMAL ONCE
Refills: 0 | Status: COMPLETED | OUTPATIENT
Start: 2020-10-22 | End: 2020-10-22

## 2020-10-22 RX ORDER — OXYCODONE HYDROCHLORIDE 5 MG/1
10 TABLET ORAL ONCE
Refills: 0 | Status: COMPLETED | OUTPATIENT
Start: 2020-10-22 | End: 2020-10-22

## 2020-10-22 RX ORDER — HYDROMORPHONE HYDROCHLORIDE 2 MG/ML
0.5 INJECTION INTRAMUSCULAR; INTRAVENOUS; SUBCUTANEOUS
Refills: 0 | Status: DISCONTINUED | OUTPATIENT
Start: 2020-10-22 | End: 2020-10-23

## 2020-10-22 RX ORDER — HYDROMORPHONE HYDROCHLORIDE 2 MG/ML
30 INJECTION INTRAMUSCULAR; INTRAVENOUS; SUBCUTANEOUS
Refills: 0 | Status: DISCONTINUED | OUTPATIENT
Start: 2020-10-22 | End: 2020-10-23

## 2020-10-22 RX ORDER — CITALOPRAM 10 MG/1
40 TABLET, FILM COATED ORAL AT BEDTIME
Refills: 0 | Status: DISCONTINUED | OUTPATIENT
Start: 2020-10-22 | End: 2020-10-24

## 2020-10-22 RX ORDER — INFLUENZA VIRUS VACCINE 15; 15; 15; 15 UG/.5ML; UG/.5ML; UG/.5ML; UG/.5ML
0.5 SUSPENSION INTRAMUSCULAR ONCE
Refills: 0 | Status: COMPLETED | OUTPATIENT
Start: 2020-10-22 | End: 2020-10-22

## 2020-10-22 RX ORDER — ACETAMINOPHEN 500 MG
975 TABLET ORAL ONCE
Refills: 0 | Status: COMPLETED | OUTPATIENT
Start: 2020-10-22 | End: 2020-10-22

## 2020-10-22 RX ORDER — SODIUM CHLORIDE 9 MG/ML
500 INJECTION, SOLUTION INTRAVENOUS ONCE
Refills: 0 | Status: COMPLETED | OUTPATIENT
Start: 2020-10-22 | End: 2020-10-22

## 2020-10-22 RX ORDER — OXYCODONE HYDROCHLORIDE 5 MG/1
5 TABLET ORAL ONCE
Refills: 0 | Status: DISCONTINUED | OUTPATIENT
Start: 2020-10-22 | End: 2020-10-22

## 2020-10-22 RX ADMIN — FENTANYL CITRATE 50 MICROGRAM(S): 50 INJECTION INTRAVENOUS at 10:18

## 2020-10-22 RX ADMIN — SCOPALAMINE 1 PATCH: 1 PATCH, EXTENDED RELEASE TRANSDERMAL at 18:26

## 2020-10-22 RX ADMIN — SCOPALAMINE 1 PATCH: 1 PATCH, EXTENDED RELEASE TRANSDERMAL at 07:39

## 2020-10-22 RX ADMIN — HYDROMORPHONE HYDROCHLORIDE 30 MILLILITER(S): 2 INJECTION INTRAMUSCULAR; INTRAVENOUS; SUBCUTANEOUS at 10:34

## 2020-10-22 RX ADMIN — SODIUM CHLORIDE 500 MILLILITER(S): 9 INJECTION, SOLUTION INTRAVENOUS at 09:53

## 2020-10-22 RX ADMIN — SODIUM CHLORIDE 75 MILLILITER(S): 9 INJECTION, SOLUTION INTRAVENOUS at 09:51

## 2020-10-22 RX ADMIN — Medication 975 MILLIGRAM(S): at 07:06

## 2020-10-22 RX ADMIN — SODIUM CHLORIDE 500 MILLILITER(S): 9 INJECTION, SOLUTION INTRAVENOUS at 10:35

## 2020-10-22 RX ADMIN — SODIUM CHLORIDE 3 MILLILITER(S): 9 INJECTION INTRAMUSCULAR; INTRAVENOUS; SUBCUTANEOUS at 13:49

## 2020-10-22 RX ADMIN — Medication 975 MILLIGRAM(S): at 07:08

## 2020-10-22 RX ADMIN — CITALOPRAM 40 MILLIGRAM(S): 10 TABLET, FILM COATED ORAL at 21:25

## 2020-10-22 RX ADMIN — APREPITANT 40 MILLIGRAM(S): 80 CAPSULE ORAL at 07:39

## 2020-10-22 RX ADMIN — FENTANYL CITRATE 50 MICROGRAM(S): 50 INJECTION INTRAVENOUS at 10:07

## 2020-10-22 RX ADMIN — FAMOTIDINE 20 MILLIGRAM(S): 10 INJECTION INTRAVENOUS at 07:06

## 2020-10-22 RX ADMIN — SODIUM CHLORIDE 3 MILLILITER(S): 9 INJECTION INTRAMUSCULAR; INTRAVENOUS; SUBCUTANEOUS at 21:27

## 2020-10-22 NOTE — BRIEF OPERATIVE NOTE - NSICDXBRIEFPROCEDURE_GEN_ALL_CORE_FT
PROCEDURES:  Salpingo-oophorectomy, bilateral 22-Oct-2020 09:53:47  Henry De La Cruz  Subtotal hysterectomy 22-Oct-2020 09:53:31  Henry De La Cruz

## 2020-10-23 ENCOUNTER — TRANSCRIPTION ENCOUNTER (OUTPATIENT)
Age: 65
End: 2020-10-23

## 2020-10-23 LAB
ANION GAP SERPL CALC-SCNC: 4 MMOL/L — LOW (ref 5–17)
BUN SERPL-MCNC: 15 MG/DL — SIGNIFICANT CHANGE UP (ref 7–23)
CALCIUM SERPL-MCNC: 8.6 MG/DL — SIGNIFICANT CHANGE UP (ref 8.5–10.1)
CHLORIDE SERPL-SCNC: 106 MMOL/L — SIGNIFICANT CHANGE UP (ref 96–108)
CO2 SERPL-SCNC: 29 MMOL/L — SIGNIFICANT CHANGE UP (ref 22–31)
CREAT SERPL-MCNC: 0.85 MG/DL — SIGNIFICANT CHANGE UP (ref 0.5–1.3)
GLUCOSE SERPL-MCNC: 130 MG/DL — HIGH (ref 70–99)
HCT VFR BLD CALC: 40.1 % — SIGNIFICANT CHANGE UP (ref 34.5–45)
HGB BLD-MCNC: 12.7 G/DL — SIGNIFICANT CHANGE UP (ref 11.5–15.5)
MCHC RBC-ENTMCNC: 29.8 PG — SIGNIFICANT CHANGE UP (ref 27–34)
MCHC RBC-ENTMCNC: 31.7 GM/DL — LOW (ref 32–36)
MCV RBC AUTO: 94.1 FL — SIGNIFICANT CHANGE UP (ref 80–100)
PLATELET # BLD AUTO: 168 K/UL — SIGNIFICANT CHANGE UP (ref 150–400)
POTASSIUM SERPL-MCNC: 3.9 MMOL/L — SIGNIFICANT CHANGE UP (ref 3.5–5.3)
POTASSIUM SERPL-SCNC: 3.9 MMOL/L — SIGNIFICANT CHANGE UP (ref 3.5–5.3)
RBC # BLD: 4.26 M/UL — SIGNIFICANT CHANGE UP (ref 3.8–5.2)
RBC # FLD: 11.9 % — SIGNIFICANT CHANGE UP (ref 10.3–14.5)
SODIUM SERPL-SCNC: 139 MMOL/L — SIGNIFICANT CHANGE UP (ref 135–145)
WBC # BLD: 9.31 K/UL — SIGNIFICANT CHANGE UP (ref 3.8–10.5)
WBC # FLD AUTO: 9.31 K/UL — SIGNIFICANT CHANGE UP (ref 3.8–10.5)

## 2020-10-23 RX ORDER — ONDANSETRON 8 MG/1
8 TABLET, FILM COATED ORAL EVERY 8 HOURS
Refills: 0 | Status: DISCONTINUED | OUTPATIENT
Start: 2020-10-23 | End: 2020-10-24

## 2020-10-23 RX ORDER — OXYCODONE AND ACETAMINOPHEN 5; 325 MG/1; MG/1
1 TABLET ORAL EVERY 4 HOURS
Refills: 0 | Status: DISCONTINUED | OUTPATIENT
Start: 2020-10-23 | End: 2020-10-24

## 2020-10-23 RX ORDER — IBUPROFEN 200 MG
600 TABLET ORAL EVERY 8 HOURS
Refills: 0 | Status: DISCONTINUED | OUTPATIENT
Start: 2020-10-23 | End: 2020-10-24

## 2020-10-23 RX ORDER — IBUPROFEN 200 MG
600 TABLET ORAL EVERY 12 HOURS
Refills: 0 | Status: DISCONTINUED | OUTPATIENT
Start: 2020-10-23 | End: 2020-10-23

## 2020-10-23 RX ADMIN — SODIUM CHLORIDE 3 MILLILITER(S): 9 INJECTION INTRAMUSCULAR; INTRAVENOUS; SUBCUTANEOUS at 05:25

## 2020-10-23 RX ADMIN — Medication 600 MILLIGRAM(S): at 15:57

## 2020-10-23 RX ADMIN — SCOPALAMINE 1 PATCH: 1 PATCH, EXTENDED RELEASE TRANSDERMAL at 06:49

## 2020-10-23 RX ADMIN — CITALOPRAM 40 MILLIGRAM(S): 10 TABLET, FILM COATED ORAL at 21:23

## 2020-10-23 RX ADMIN — SCOPALAMINE 1 PATCH: 1 PATCH, EXTENDED RELEASE TRANSDERMAL at 20:34

## 2020-10-23 RX ADMIN — OXYCODONE AND ACETAMINOPHEN 1 TABLET(S): 5; 325 TABLET ORAL at 13:38

## 2020-10-23 RX ADMIN — Medication 0.5 MILLIGRAM(S): at 21:23

## 2020-10-23 RX ADMIN — OXYCODONE AND ACETAMINOPHEN 1 TABLET(S): 5; 325 TABLET ORAL at 22:29

## 2020-10-23 RX ADMIN — OXYCODONE AND ACETAMINOPHEN 1 TABLET(S): 5; 325 TABLET ORAL at 17:33

## 2020-10-23 RX ADMIN — OXYCODONE AND ACETAMINOPHEN 1 TABLET(S): 5; 325 TABLET ORAL at 21:49

## 2020-10-23 RX ADMIN — OXYCODONE AND ACETAMINOPHEN 1 TABLET(S): 5; 325 TABLET ORAL at 14:10

## 2020-10-23 RX ADMIN — Medication 600 MILLIGRAM(S): at 16:30

## 2020-10-23 RX ADMIN — ENOXAPARIN SODIUM 40 MILLIGRAM(S): 100 INJECTION SUBCUTANEOUS at 10:19

## 2020-10-23 RX ADMIN — SODIUM CHLORIDE 3 MILLILITER(S): 9 INJECTION INTRAMUSCULAR; INTRAVENOUS; SUBCUTANEOUS at 09:48

## 2020-10-23 NOTE — PROGRESS NOTE ADULT - SUBJECTIVE AND OBJECTIVE BOX
Gyn Progress Note    Una Curry, 64yo F S/P abdominal supracervical hysterectomy, bilateral salpingooophorectomy  Postoperative day 1    Subjective:   Pt seen and examined at bedside. No events overnight. Pain well controlled on PCA pump. Hinton removed, patient was able to urinate small amount of urine. Patient ambulating. Passing flatus. Tolerating regular diet. Pt denies fever, chills, chest pain, SOB, nausea, vomiting, lightheadedness, dizziness.      Objective:  T(F): 97.9 (10-23-20 @ 04:06), Max: 98.5 (10-22-20 @ 13:04)  HR: 86 (10-23-20 @ 04:06) (56 - 88)  BP: 96/48 (10-23-20 @ 04:06) (88/57 - 134/89)  RR: 16 (10-23-20 @ 04:06) (10 - 18)  SpO2: 95% (10-23-20 @ 04:06) (95% - 100%)  I&O's Summary    22 Oct 2020 07:01  -  23 Oct 2020 07:00  --------------------------------------------------------  IN: 2472 mL / OUT: 2675 mL / NET: -203 mL      CAPILLARY BLOOD GLUCOSE  POCT Blood Glucose.: 98 mg/dL (23 Oct 2020 06:25)  POCT Blood Glucose.: 133 mg/dL (22 Oct 2020 16:04)  POCT Blood Glucose.: 146 mg/dL (22 Oct 2020 09:44)  POCT Blood Glucose.: 115 mg/dL (22 Oct 2020 08:08)      MEDICATIONS  (STANDING):  ALPRAZolam 0.5 milliGRAM(s) Oral at bedtime  citalopram 40 milliGRAM(s) Oral at bedtime  enoxaparin Injectable 40 milliGRAM(s) SubCutaneous daily  ondansetron Injectable 4 milliGRAM(s) IV Push once  sodium chloride 0.9% lock flush 3 milliLiter(s) IV Push every 8 hours    MEDICATIONS  (PRN):  naloxone Injectable 0.1 milliGRAM(s) IV Push every 3 minutes PRN For ANY of the following changes in patient status:  A. RR LESS THAN 10 breaths per minute, B. Oxygen saturation LESS THAN 90%, C. Sedation score of 6  ondansetron Injectable 4 milliGRAM(s) IV Push every 6 hours PRN Nausea  polyethylene glycol 3350 17 Gram(s) Oral once PRN Constipation      Physical Exam:  Constitutional: NAD, A+O x3  CV: RRR  Lungs: clear to auscultation bilaterally  Abdomen: soft, bowel sounds present, appropriately tender, nondistended, no rebound or guarding  Incision:  dry, intact, covered with tegaderm, that is slightly stained, staples underneath  Extremities: no lower extremity edema or calf tenderness bilaterally, venodynes in place    AM labs pending,    Labs:                        13.4   9.35  )-----------( 164      ( 22 Oct 2020 10:51 )             40.4   baso 0.2    eos 0.6    imm gran 0.2    lymph 7.5    mono 1.9    poly 89.6     10-22    140    |  109<H>  |  17     ----------------------------<  126<H>  4.1     |  29     |  0.79     Ca    8.6        22 Oct 2020 10:51                         Gyn Progress Note    Una Curry, 66yo F S/P abdominal supracervical hysterectomy, bilateral salpingectomy  Postoperative day 1    Subjective:   Pt seen and examined at bedside. No events overnight. Pain well controlled on PCA pump. Hinton removed, patient was able to urinate small amount of urine. Patient ambulating. Passing flatus. Tolerating regular diet. Pt denies fever, chills, chest pain, SOB, nausea, vomiting, lightheadedness, dizziness.      Objective:  T(F): 97.9 (10-23-20 @ 04:06), Max: 98.5 (10-22-20 @ 13:04)  HR: 86 (10-23-20 @ 04:06) (56 - 88)  BP: 96/48 (10-23-20 @ 04:06) (88/57 - 134/89)  RR: 16 (10-23-20 @ 04:06) (10 - 18)  SpO2: 95% (10-23-20 @ 04:06) (95% - 100%)  I&O's Summary    22 Oct 2020 07:01  -  23 Oct 2020 07:00  --------------------------------------------------------  IN: 2472 mL / OUT: 2675 mL / NET: -203 mL      CAPILLARY BLOOD GLUCOSE  POCT Blood Glucose.: 98 mg/dL (23 Oct 2020 06:25)  POCT Blood Glucose.: 133 mg/dL (22 Oct 2020 16:04)  POCT Blood Glucose.: 146 mg/dL (22 Oct 2020 09:44)  POCT Blood Glucose.: 115 mg/dL (22 Oct 2020 08:08)      MEDICATIONS  (STANDING):  ALPRAZolam 0.5 milliGRAM(s) Oral at bedtime  citalopram 40 milliGRAM(s) Oral at bedtime  enoxaparin Injectable 40 milliGRAM(s) SubCutaneous daily  ondansetron Injectable 4 milliGRAM(s) IV Push once  sodium chloride 0.9% lock flush 3 milliLiter(s) IV Push every 8 hours    MEDICATIONS  (PRN):  naloxone Injectable 0.1 milliGRAM(s) IV Push every 3 minutes PRN For ANY of the following changes in patient status:  A. RR LESS THAN 10 breaths per minute, B. Oxygen saturation LESS THAN 90%, C. Sedation score of 6  ondansetron Injectable 4 milliGRAM(s) IV Push every 6 hours PRN Nausea  polyethylene glycol 3350 17 Gram(s) Oral once PRN Constipation      Physical Exam:  Constitutional: NAD, A+O x3  CV: RRR  Lungs: clear to auscultation bilaterally  Abdomen: soft, bowel sounds present, appropriately tender, nondistended, no rebound or guarding  Incision:  dry, intact, covered with tegaderm, that is slightly stained, staples underneath  Extremities: no lower extremity edema or calf tenderness bilaterally, venodynes in place    AM labs pending,    Labs:                        13.4   9.35  )-----------( 164      ( 22 Oct 2020 10:51 )             40.4   baso 0.2    eos 0.6    imm gran 0.2    lymph 7.5    mono 1.9    poly 89.6     10-22    140    |  109<H>  |  17     ----------------------------<  126<H>  4.1     |  29     |  0.79     Ca    8.6        22 Oct 2020 10:51

## 2020-10-23 NOTE — PROGRESS NOTE ADULT - ASSESSMENT
Una Kwondy, 64yo F S/P abdominal supracervical hysterectomy, bilateral salpingooophorectomy  Postoperative day 1    Neuro: PCA discontinued, switched to oral meds for pain control  CV: Hemodynamically stable  Pulm: Saturating well on RA. Increase incentive spirometry, out of bed, and ambulation as tolerated.  GI: flatus present, bowel sounds present, nondistended, tolerating regular diet  : castle removed this AM, patient urinated small amount, will follow adequate TOV  Heme: Continue Lovenox and Venodynes for DVT ppx. Increase OOB and ambulation.     Plan to discharge home when meeting the criteria for discharge Una Kwondy, 66yo F S/P abdominal supracervical hysterectomy, bilateral salpingectomy  Postoperative day 1    Neuro: PCA discontinued, switched to oral meds for pain control  CV: Hemodynamically stable  Pulm: Saturating well on RA. Increase incentive spirometry, out of bed, and ambulation as tolerated.  GI: flatus present, bowel sounds present, nondistended, tolerating regular diet  : castle removed this AM, patient urinated small amount, will follow adequate TOV  Heme: Continue Lovenox and Venodynes for DVT ppx. Increase OOB and ambulation.     Plan to discharge home when meeting the criteria for discharge

## 2020-10-23 NOTE — PROGRESS NOTE ADULT - SUBJECTIVE AND OBJECTIVE BOX
Postoperative day: 1 Subtotal hysterectomy,bilateral salpingectomies    Subjective:  The patient feels well.  She is ambulating.  She is tolerating a regular diet.  She denies nausea and vomiting.  She is passing flatus.  She denies chest pain or shortness of breath.    Physical exam:    Vital Signs Last 24 Hrs  T(C): 36.8 (23 Oct 2020 09:17), Max: 36.9 (22 Oct 2020 20:26)  T(F): 98.2 (23 Oct 2020 09:17), Max: 98.4 (22 Oct 2020 20:26)  HR: 85 (23 Oct 2020 09:17) (72 - 88)  BP: 96/58 (23 Oct 2020 09:17) (96/48 - 134/89)  BP(mean): 66 (23 Oct 2020 09:17) (66 - 66)  RR: 16 (23 Oct 2020 09:17) (16 - 18)  SpO2: 98% (23 Oct 2020 09:17) (95% - 98%)    Gen: NAD  Abdomen: Soft,  appropriately tender  Incision is clean dry and intact  Ext: No calf tenderness  SCD's in place and working    LABS:                        13.4   9.35  )-----------( 164      ( 22 Oct 2020 10:51 )             40.4     10-22    140  |  109<H>  |  17  ----------------------------<  126<H>  4.1   |  29  |  0.79    Ca    8.6      22 Oct 2020 10:51        Allergies    No Known Allergies    Intolerances        RADIOLOGY & ADDITIONAL TESTS:    Assessment and Plan  POD # 1 s/p ELAP, subtotal hysterectomy, bilateral salpingectomies  Doing well.  Tolerating regular diet.  Encouraged ambulation.  Routine post op care.

## 2020-10-24 ENCOUNTER — TRANSCRIPTION ENCOUNTER (OUTPATIENT)
Age: 65
End: 2020-10-24

## 2020-10-24 VITALS
DIASTOLIC BLOOD PRESSURE: 64 MMHG | SYSTOLIC BLOOD PRESSURE: 111 MMHG | OXYGEN SATURATION: 95 % | TEMPERATURE: 98 F | RESPIRATION RATE: 18 BRPM | HEART RATE: 65 BPM

## 2020-10-24 RX ORDER — CHOLECALCIFEROL (VITAMIN D3) 125 MCG
1 CAPSULE ORAL
Qty: 0 | Refills: 0 | DISCHARGE

## 2020-10-24 RX ORDER — CITALOPRAM 10 MG/1
1 TABLET, FILM COATED ORAL
Qty: 0 | Refills: 0 | DISCHARGE

## 2020-10-24 RX ORDER — IBUPROFEN 200 MG
1 TABLET ORAL
Qty: 28 | Refills: 0
Start: 2020-10-24 | End: 2020-10-30

## 2020-10-24 RX ORDER — OMEGA-3 ACID ETHYL ESTERS 1 G
1 CAPSULE ORAL
Qty: 0 | Refills: 0 | DISCHARGE

## 2020-10-24 RX ORDER — ALPRAZOLAM 0.25 MG
1 TABLET ORAL
Qty: 0 | Refills: 0 | DISCHARGE

## 2020-10-24 RX ADMIN — Medication 600 MILLIGRAM(S): at 00:17

## 2020-10-24 RX ADMIN — OXYCODONE AND ACETAMINOPHEN 1 TABLET(S): 5; 325 TABLET ORAL at 07:24

## 2020-10-24 RX ADMIN — OXYCODONE AND ACETAMINOPHEN 1 TABLET(S): 5; 325 TABLET ORAL at 06:33

## 2020-10-24 RX ADMIN — Medication 600 MILLIGRAM(S): at 01:00

## 2020-10-24 RX ADMIN — ONDANSETRON 8 MILLIGRAM(S): 8 TABLET, FILM COATED ORAL at 09:15

## 2020-10-24 NOTE — DISCHARGE NOTE PROVIDER - NSDCMRMEDTOKEN_GEN_ALL_CORE_FT
ibuprofen 600 mg oral tablet: 1 tab(s) orally every 6 hours, As Needed -for mild pain   oxycodone-acetaminophen 5 mg-325 mg oral tablet: 1 tab(s) orally every 6 hours, As Needed -for severe pain MDD:4 tabs

## 2020-10-24 NOTE — DISCHARGE NOTE PROVIDER - NSDCCPCAREPLAN_GEN_ALL_CORE_FT
PRINCIPAL DISCHARGE DIAGNOSIS  Diagnosis: S/P hysterectomy  Assessment and Plan of Treatment:

## 2020-10-24 NOTE — DISCHARGE NOTE PROVIDER - NSDCFUADDINST_GEN_ALL_CORE_FT
1) Please take ibuprofen, tylenol and other prescribed medications as needed for pain.  2) Miralax, Senna can be purchased at the pharmacy to help with bowel regularity   3) Nothing in the vagina for 6 weeks (including no sex, no tampons, and no douching).  4) No tub baths or pools for 6 weeks. Showers are okay. Please keep your incision(s) dry until your follow up appointment.  5) Limit heavy lifting over 10lbs until your follow up appointment  6) Please call your doctor for a follow up appointment if you do not already have one  7) Please call the office sooner if you have heavy vaginal bleeding, severe abdominal pain, fever over 100.4F, or are unable to urinate

## 2020-10-24 NOTE — PROGRESS NOTE ADULT - SUBJECTIVE AND OBJECTIVE BOX
Postoperative day: 2 Subtotal hysterectomy,bilateral salpingectomies    Subjective:  The patient feels well.  pt requests dish  She is ambulating.  She is tolerating a regular diet.  She denies nausea and vomiting.  She is passing flatus.  She denies chest pain or shortness of breath.    Physical exam:    Vital Signs Last 24 Hrs  T(C): 36.7 (24 Oct 2020 08:40), Max: 36.8 (23 Oct 2020 21:05)  T(F): 98 (24 Oct 2020 08:40), Max: 98.2 (23 Oct 2020 21:05)  HR: 65 (24 Oct 2020 08:40) (65 - 68)  BP: 111/64 (24 Oct 2020 08:40) (103/65 - 111/64)  BP(mean): --  RR: 18 (24 Oct 2020 08:40) (17 - 18)  SpO2: 95% (24 Oct 2020 08:40) (94% - 96%)    Gen: NAD  Abdomen: Soft,  appropriately tender  Incision is clean dry and intact  Ext: No calf tenderness  SCD's in place and working    LABS:                        12.7   9.31  )-----------( 168      ( 23 Oct 2020 14:00 )             40.1     10-23    139  |  106  |  15  ----------------------------<  130<H>  3.9   |  29  |  0.85    Ca    8.6      23 Oct 2020 14:00        Allergies    No Known Allergies    Intolerances        RADIOLOGY & ADDITIONAL TESTS:    Assessment and Plan  POD # 2 s/p ELAP, subtotal hysterectomy, bilateral salpingectomies  Doing well.  disch home   instructions and precautions reviewed    Tolerating regular diet.  Encouraged ambulation.  Routine post op care.  follow up in office in 3-4 days

## 2020-10-24 NOTE — DISCHARGE NOTE PROVIDER - EXTENDED VTE YES NO FOR MLM ENOXAPARIN
B12 injection given. Return in 1 month. Patient tolerated without incident. See MAR for documentation.    
,

## 2020-10-24 NOTE — DISCHARGE NOTE PROVIDER - CARE PROVIDER_API CALL
Henry De La Cruz)  Obstetrics and Gynecology  [address] [place] [address]  Phone: [phone]  Fax: [phone]  Follow Up Time:

## 2020-10-24 NOTE — DISCHARGE NOTE PROVIDER - HOSPITAL COURSE
Patient admitted after MARIETTA+BS. She was transferred to the floor in stable condition after uneventful PACU recovery. Her hospital stay was uncomplicated. Upon discharge she was ambulating, voiding, tolerating PO. Pain well controlled with prn pain meds.

## 2020-10-24 NOTE — DISCHARGE NOTE NURSING/CASE MANAGEMENT/SOCIAL WORK - PATIENT PORTAL LINK FT
You can access the FollowMyHealth Patient Portal offered by [facility] by registering at the following website: [email or web address]. By joining [facility]’s FollowMyHealth portal, you will also be able to view your health information using other applications (apps) compatible with our system.

## 2020-10-24 NOTE — DISCHARGE NOTE PROVIDER - NSDCCPTREATMENT_GEN_ALL_CORE_FT
PRINCIPAL PROCEDURE  Procedure: Subtotal hysterectomy  Findings and Treatment:       SECONDARY PROCEDURE  Procedure: Bilateral salpingo-oophorectomy  Findings and Treatment:

## 2020-10-26 ENCOUNTER — NON-APPOINTMENT (OUTPATIENT)
Age: 65
End: 2020-10-26

## 2020-10-26 PROBLEM — M85.80 OTHER SPECIFIED DISORDERS OF BONE DENSITY AND STRUCTURE, UNSPECIFIED SITE: Chronic | Status: ACTIVE | Noted: 2020-10-16

## 2020-10-26 PROBLEM — R32 UNSPECIFIED URINARY INCONTINENCE: Chronic | Status: ACTIVE | Noted: 2020-10-16

## 2020-10-26 PROBLEM — N83.201 UNSPECIFIED OVARIAN CYST, RIGHT SIDE: Chronic | Status: ACTIVE | Noted: 2020-10-16

## 2020-10-26 PROBLEM — F41.9 ANXIETY DISORDER, UNSPECIFIED: Chronic | Status: ACTIVE | Noted: 2020-10-16

## 2020-10-26 PROBLEM — M19.011 PRIMARY OSTEOARTHRITIS, RIGHT SHOULDER: Chronic | Status: ACTIVE | Noted: 2020-10-16

## 2020-10-26 PROBLEM — M17.0 BILATERAL PRIMARY OSTEOARTHRITIS OF KNEE: Chronic | Status: ACTIVE | Noted: 2020-10-16

## 2020-10-26 PROBLEM — I45.10 UNSPECIFIED RIGHT BUNDLE-BRANCH BLOCK: Chronic | Status: ACTIVE | Noted: 2020-10-16

## 2020-10-26 PROBLEM — R33.9 RETENTION OF URINE, UNSPECIFIED: Chronic | Status: ACTIVE | Noted: 2020-10-16

## 2020-10-29 ENCOUNTER — APPOINTMENT (OUTPATIENT)
Dept: OBGYN | Facility: CLINIC | Age: 65
End: 2020-10-29
Payer: MEDICARE

## 2020-10-29 VITALS
BODY MASS INDEX: 30.49 KG/M2 | SYSTOLIC BLOOD PRESSURE: 110 MMHG | TEMPERATURE: 98.9 F | DIASTOLIC BLOOD PRESSURE: 68 MMHG | WEIGHT: 183 LBS | HEIGHT: 65 IN

## 2020-10-29 PROCEDURE — 99024 POSTOP FOLLOW-UP VISIT: CPT

## 2020-10-29 NOTE — COUNSELING
[Nutrition/ Exercise/ Weight Management] : nutrition, exercise, weight management [Pre/Post Op Instructions] : pre/post op instructions

## 2020-10-29 NOTE — HISTORY OF PRESENT ILLNESS
[FreeTextEntry1] : pt is a 64 yo female who is 1 week s/p hysterectomy   pt has no complaints and states she is doing well.  pathiology reveialed a benign serous/mucinous cystadenoma of the ovary  discussed with pt

## 2020-11-06 ENCOUNTER — APPOINTMENT (OUTPATIENT)
Dept: OBGYN | Facility: CLINIC | Age: 65
End: 2020-11-06
Payer: MEDICARE

## 2020-11-06 VITALS — DIASTOLIC BLOOD PRESSURE: 70 MMHG | RESPIRATION RATE: 16 BRPM | SYSTOLIC BLOOD PRESSURE: 110 MMHG

## 2020-11-06 PROCEDURE — 99024 POSTOP FOLLOW-UP VISIT: CPT

## 2020-11-06 NOTE — HISTORY OF PRESENT ILLNESS
[FreeTextEntry1] : Vision is a 65-year-old female who is 2 weeks status post abdominal hysterectomy for serous cystadenoma of the right ovary   patient has no complaints. Discussed benign pathology with patient.

## 2020-11-25 ENCOUNTER — APPOINTMENT (OUTPATIENT)
Dept: OBGYN | Facility: CLINIC | Age: 65
End: 2020-11-25
Payer: MEDICARE

## 2020-11-25 VITALS
SYSTOLIC BLOOD PRESSURE: 118 MMHG | DIASTOLIC BLOOD PRESSURE: 70 MMHG | WEIGHT: 183 LBS | TEMPERATURE: 97.7 F | BODY MASS INDEX: 30.49 KG/M2 | HEIGHT: 65 IN

## 2020-11-25 DIAGNOSIS — D27.1 BENIGN NEOPLASM OF LEFT OVARY: ICD-10-CM

## 2020-11-25 DIAGNOSIS — Z86.018 PERSONAL HISTORY OF OTHER BENIGN NEOPLASM: ICD-10-CM

## 2020-11-25 PROCEDURE — 99024 POSTOP FOLLOW-UP VISIT: CPT

## 2020-12-22 ENCOUNTER — APPOINTMENT (OUTPATIENT)
Dept: UROGYNECOLOGY | Facility: CLINIC | Age: 65
End: 2020-12-22
Payer: MEDICARE

## 2020-12-22 VITALS
HEIGHT: 65 IN | SYSTOLIC BLOOD PRESSURE: 123 MMHG | BODY MASS INDEX: 30.49 KG/M2 | WEIGHT: 183 LBS | DIASTOLIC BLOOD PRESSURE: 86 MMHG

## 2020-12-22 DIAGNOSIS — Z01.818 ENCOUNTER FOR OTHER PREPROCEDURAL EXAMINATION: ICD-10-CM

## 2020-12-22 DIAGNOSIS — Z87.898 PERSONAL HISTORY OF OTHER SPECIFIED CONDITIONS: ICD-10-CM

## 2020-12-22 DIAGNOSIS — N90.5 ATROPHY OF VULVA: ICD-10-CM

## 2020-12-22 DIAGNOSIS — R19.00 INTRA-ABDOMINAL AND PELVIC SWELLING, MASS AND LUMP, UNSPECIFIED SITE: ICD-10-CM

## 2020-12-22 DIAGNOSIS — R32 UNSPECIFIED URINARY INCONTINENCE: ICD-10-CM

## 2020-12-22 DIAGNOSIS — Z63.4 DISAPPEARANCE AND DEATH OF FAMILY MEMBER: ICD-10-CM

## 2020-12-22 DIAGNOSIS — G89.18 OTHER ACUTE POSTPROCEDURAL PAIN: ICD-10-CM

## 2020-12-22 PROCEDURE — 81003 URINALYSIS AUTO W/O SCOPE: CPT | Mod: QW

## 2020-12-22 PROCEDURE — 99204 OFFICE O/P NEW MOD 45 MIN: CPT | Mod: 25

## 2020-12-22 PROCEDURE — 51701 INSERT BLADDER CATHETER: CPT

## 2020-12-22 RX ORDER — OXYCODONE AND ACETAMINOPHEN 5; 325 MG/1; MG/1
5-325 TABLET ORAL
Qty: 15 | Refills: 0 | Status: DISCONTINUED | COMMUNITY
Start: 2020-10-24 | End: 2020-12-22

## 2020-12-22 RX ORDER — FENOFIBRATE 160 MG/1
160 TABLET ORAL
Refills: 0 | Status: ACTIVE | COMMUNITY

## 2020-12-22 RX ORDER — PHENAZOPYRIDINE HYDROCHLORIDE 100 MG/1
100 TABLET ORAL 3 TIMES DAILY
Qty: 9 | Refills: 0 | Status: DISCONTINUED | COMMUNITY
Start: 2020-08-19 | End: 2020-12-22

## 2020-12-22 SDOH — SOCIAL STABILITY - SOCIAL INSECURITY: DISSAPEARANCE AND DEATH OF FAMILY MEMBER: Z63.4

## 2020-12-22 NOTE — OB HISTORY
[Vaginal ___] : [unfilled] vaginal delivery(s) [Definite ___ (Date)] : the last menstrual period was [unfilled] [Last Pap Smear ___] : date of last pap smear was on [unfilled] [Abnormal Pap Smear] : normal pap smear [Taking Estrogens] : is not taking estrogen replacement [Sexually Active] : is not sexually active [FreeTextEntry1] : Largest baby 9#. Forceps delivery.

## 2020-12-22 NOTE — DISCUSSION/SUMMARY
[FreeTextEntry1] : Ms. ETIENNE is 65 with mixed urinary incontinence, hematuria on clean catch, vaginal atrophy, bowel urgency and rare accidents. We talked about the types of urinary incontinence and the treatment options. We reviewed physical therapy, medication, surgery, vaginal inserts and third line treatments. I recommended bladder testing UDTs. I sent her urine to the lab. I gave her some literature on pelvic muscle strengthening. I was able to answer all of her questions.\par

## 2020-12-22 NOTE — REASON FOR VISIT
[Urinary Incontinence] : urinary incontinence [Urinary Urgency] : urinary urgency [Cannot Empty Bladder] : cannot empty bladder

## 2020-12-22 NOTE — LETTER BODY
[Dear  ___] : Dear  [unfilled], [I had the pleasure of evaluating your patient, [unfilled]. Thank you for referring Ms. [unfilled] for consultation for ___] : I had the pleasure of evaluating your patient, [unfilled]. Thank you for referring Ms. [unfilled] for consultation for [unfilled]. [Attached please find my note.] : Attached please find my note. [Thank you very much for allowing me to participate in the care of this patient. If you have any questions, please do not hesitate to contact me] : Thank you very much for allowing me to participate in the care of this patient. If you have any questions, please do not hesitate to contact me. [DrClaudia  ___] : Dr. RODRIGUEZ

## 2020-12-22 NOTE — PHYSICAL EXAM
[Chaperone Present] : A chaperone was present in the examining room during all aspects of the physical examination [No Acute Distress] : in no acute distress [Well developed] : well developed [Well Nourished] : ~L well nourished [Oriented x3] : oriented to person, place, and time [No Edema] : ~T edema was not present [Scar] : a scar was noted [Warm and Dry] : was warm and dry to touch [Normal Gait] : gait was normal [Normal Appearance] : general appearance was normal [Atrophy] : atrophy [1] : 1 [Aa ____] : Aa [unfilled] [Ba ____] : Ba [unfilled] [C ____] : C [unfilled] [GH ____] : GH [unfilled] [PB ____] : PB [unfilled] [TVL ____] : TVL  [unfilled] [Ap ____] : Ap [unfilled] [Bp ____] : Bp [unfilled] [D ____] : D [unfilled] [Absent] : absent [Normal] : no abnormalities [Post Void Residual ____ml] : post void residual was [unfilled] ml [Cough] : no cough [Tenderness] : ~T no ~M abdominal tenderness observed [Distended] : not distended [Hernia] : no hernia observed

## 2020-12-22 NOTE — HISTORY OF PRESENT ILLNESS
[FreeTextEntry1] : 66 yo  female with complaints of lekaing of uirne. Started about ayear ago. Saw a urologist and got an U/S and found to have an ovarian cyst. She had ERNIE BSO in October 2020 and hoping that was contributing to the leaking. Unfortunately the leaking has continued. She leaks with urge and with activity and with incidental loss. She does feels she has to void frequently in the evening. Has some urgency. Feels she is not completely emptying. VOids and then will still have an urge and will go again. She has to strain to get last bit of urine out. She wears pads all the time. No treatment until now. She has some bowel urgency as well and rare bowel accidents. She denies any prolapse but the urologist told her she has a little bit of a dropped bladder.

## 2020-12-24 LAB
APPEARANCE: CLEAR
BACTERIA UR CULT: NORMAL
BACTERIA: NEGATIVE
BILIRUBIN URINE: NEGATIVE
BLOOD URINE: NEGATIVE
COLOR: NORMAL
GLUCOSE QUALITATIVE U: NEGATIVE
HYALINE CASTS: 0 /LPF
KETONES URINE: NEGATIVE
LEUKOCYTE ESTERASE URINE: NEGATIVE
MICROSCOPIC-UA: NORMAL
NITRITE URINE: NEGATIVE
PH URINE: 6
PROTEIN URINE: NEGATIVE
RED BLOOD CELLS URINE: 1 /HPF
SPECIFIC GRAVITY URINE: 1.01
SQUAMOUS EPITHELIAL CELLS: 0 /HPF
URINE CYTOLOGY: NORMAL
UROBILINOGEN URINE: NORMAL
WHITE BLOOD CELLS URINE: 0 /HPF

## 2021-01-14 ENCOUNTER — APPOINTMENT (OUTPATIENT)
Dept: UROGYNECOLOGY | Facility: CLINIC | Age: 66
End: 2021-01-14
Payer: MEDICARE

## 2021-01-14 PROCEDURE — 51729 CYSTOMETROGRAM W/VP&UP: CPT

## 2021-01-14 PROCEDURE — 51784 ANAL/URINARY MUSCLE STUDY: CPT

## 2021-01-14 PROCEDURE — 51797 INTRAABDOMINAL PRESSURE TEST: CPT

## 2021-01-14 PROCEDURE — 51741 ELECTRO-UROFLOWMETRY FIRST: CPT

## 2021-01-21 ENCOUNTER — APPOINTMENT (OUTPATIENT)
Dept: UROGYNECOLOGY | Facility: CLINIC | Age: 66
End: 2021-01-21
Payer: MEDICARE

## 2021-01-21 PROCEDURE — 99213 OFFICE O/P EST LOW 20 MIN: CPT | Mod: 25

## 2021-01-21 PROCEDURE — 51798 US URINE CAPACITY MEASURE: CPT

## 2021-01-21 NOTE — DISCUSSION/SUMMARY
[FreeTextEntry1] : We reviewed physical therapy, medication, surgery, vaginal inserts and third line treatment. She is urge predominant and so we settled on trying PT and meds. We discussed the side effects of myrbetriq. I asked her to come back next week to  samples of myrbetriq 25mg and then 3 weeks after to check her BP and see how the medications are working. I was able to answer all of her questions.\par

## 2021-01-21 NOTE — HISTORY OF PRESENT ILLNESS
[FreeTextEntry1] : Here to review options. We reviewed her UDts that confirmed JAYNA at low volumes. We discussed Una's symptoms and what seems to bother her the most is the urgency. She does leak more at night and often happens on the way to the bathroom between the hours of 6pm and midnight.

## 2021-02-25 ENCOUNTER — APPOINTMENT (OUTPATIENT)
Dept: UROGYNECOLOGY | Facility: CLINIC | Age: 66
End: 2021-02-25

## 2021-03-04 ENCOUNTER — APPOINTMENT (OUTPATIENT)
Dept: UROGYNECOLOGY | Facility: CLINIC | Age: 66
End: 2021-03-04
Payer: MEDICARE

## 2021-03-04 ENCOUNTER — OUTPATIENT (OUTPATIENT)
Dept: OUTPATIENT SERVICES | Facility: HOSPITAL | Age: 66
LOS: 1 days | End: 2021-03-04
Payer: MEDICARE

## 2021-03-04 VITALS
RESPIRATION RATE: 18 BRPM | SYSTOLIC BLOOD PRESSURE: 131 MMHG | DIASTOLIC BLOOD PRESSURE: 80 MMHG | TEMPERATURE: 98 F | WEIGHT: 188.5 LBS | HEIGHT: 66 IN | OXYGEN SATURATION: 98 % | HEART RATE: 75 BPM

## 2021-03-04 VITALS — DIASTOLIC BLOOD PRESSURE: 80 MMHG | SYSTOLIC BLOOD PRESSURE: 110 MMHG

## 2021-03-04 DIAGNOSIS — Z01.818 ENCOUNTER FOR OTHER PREPROCEDURAL EXAMINATION: ICD-10-CM

## 2021-03-04 DIAGNOSIS — Z29.9 ENCOUNTER FOR PROPHYLACTIC MEASURES, UNSPECIFIED: ICD-10-CM

## 2021-03-04 DIAGNOSIS — Z98.890 OTHER SPECIFIED POSTPROCEDURAL STATES: Chronic | ICD-10-CM

## 2021-03-04 DIAGNOSIS — M19.011 PRIMARY OSTEOARTHRITIS, RIGHT SHOULDER: ICD-10-CM

## 2021-03-04 DIAGNOSIS — Z96.659 PRESENCE OF UNSPECIFIED ARTIFICIAL KNEE JOINT: Chronic | ICD-10-CM

## 2021-03-04 LAB
A1C WITH ESTIMATED AVERAGE GLUCOSE RESULT: 5.5 % — SIGNIFICANT CHANGE UP (ref 4–5.6)
ALBUMIN SERPL ELPH-MCNC: 4.3 G/DL — SIGNIFICANT CHANGE UP (ref 3.3–5)
ANION GAP SERPL CALC-SCNC: 4 MMOL/L — LOW (ref 5–17)
APTT BLD: 31.2 SEC — SIGNIFICANT CHANGE UP (ref 27.5–35.5)
BASOPHILS # BLD AUTO: 0.05 K/UL — SIGNIFICANT CHANGE UP (ref 0–0.2)
BASOPHILS NFR BLD AUTO: 1 % — SIGNIFICANT CHANGE UP (ref 0–2)
BILIRUB UR QL STRIP: NEGATIVE
BUN SERPL-MCNC: 27 MG/DL — HIGH (ref 7–23)
CALCIUM SERPL-MCNC: 9.3 MG/DL — SIGNIFICANT CHANGE UP (ref 8.5–10.1)
CHLORIDE SERPL-SCNC: 109 MMOL/L — HIGH (ref 96–108)
CLARITY UR: CLEAR
CO2 SERPL-SCNC: 29 MMOL/L — SIGNIFICANT CHANGE UP (ref 22–31)
COLLECTION METHOD: NORMAL
CREAT SERPL-MCNC: 0.79 MG/DL — SIGNIFICANT CHANGE UP (ref 0.5–1.3)
EOSINOPHIL # BLD AUTO: 0.44 K/UL — SIGNIFICANT CHANGE UP (ref 0–0.5)
EOSINOPHIL NFR BLD AUTO: 8.6 % — HIGH (ref 0–6)
ESTIMATED AVERAGE GLUCOSE: 111 MG/DL — SIGNIFICANT CHANGE UP (ref 68–114)
GLUCOSE SERPL-MCNC: 86 MG/DL — SIGNIFICANT CHANGE UP (ref 70–99)
GLUCOSE UR-MCNC: NEGATIVE
HCG UR QL: 0.2 EU/DL
HCT VFR BLD CALC: 44.3 % — SIGNIFICANT CHANGE UP (ref 34.5–45)
HGB BLD-MCNC: 14.6 G/DL — SIGNIFICANT CHANGE UP (ref 11.5–15.5)
HGB UR QL STRIP.AUTO: NORMAL
IMM GRANULOCYTES NFR BLD AUTO: 0.2 % — SIGNIFICANT CHANGE UP (ref 0–1.5)
INR BLD: 1.02 RATIO — SIGNIFICANT CHANGE UP (ref 0.88–1.16)
KETONES UR-MCNC: NEGATIVE
LEUKOCYTE ESTERASE UR QL STRIP: NORMAL
LYMPHOCYTES # BLD AUTO: 1.58 K/UL — SIGNIFICANT CHANGE UP (ref 1–3.3)
LYMPHOCYTES # BLD AUTO: 30.9 % — SIGNIFICANT CHANGE UP (ref 13–44)
MCHC RBC-ENTMCNC: 30 PG — SIGNIFICANT CHANGE UP (ref 27–34)
MCHC RBC-ENTMCNC: 33 GM/DL — SIGNIFICANT CHANGE UP (ref 32–36)
MCV RBC AUTO: 91 FL — SIGNIFICANT CHANGE UP (ref 80–100)
MONOCYTES # BLD AUTO: 0.47 K/UL — SIGNIFICANT CHANGE UP (ref 0–0.9)
MONOCYTES NFR BLD AUTO: 9.2 % — SIGNIFICANT CHANGE UP (ref 2–14)
NEUTROPHILS # BLD AUTO: 2.56 K/UL — SIGNIFICANT CHANGE UP (ref 1.8–7.4)
NEUTROPHILS NFR BLD AUTO: 50.1 % — SIGNIFICANT CHANGE UP (ref 43–77)
NITRITE UR QL STRIP: NEGATIVE
PH UR STRIP: 5.5
PLATELET # BLD AUTO: 205 K/UL — SIGNIFICANT CHANGE UP (ref 150–400)
POTASSIUM SERPL-MCNC: 4.1 MMOL/L — SIGNIFICANT CHANGE UP (ref 3.5–5.3)
POTASSIUM SERPL-SCNC: 4.1 MMOL/L — SIGNIFICANT CHANGE UP (ref 3.5–5.3)
PROT UR STRIP-MCNC: NEGATIVE
PROTHROM AB SERPL-ACNC: 11.8 SEC — SIGNIFICANT CHANGE UP (ref 10.6–13.6)
RBC # BLD: 4.87 M/UL — SIGNIFICANT CHANGE UP (ref 3.8–5.2)
RBC # FLD: 12.1 % — SIGNIFICANT CHANGE UP (ref 10.3–14.5)
SODIUM SERPL-SCNC: 142 MMOL/L — SIGNIFICANT CHANGE UP (ref 135–145)
SP GR UR STRIP: >=1.03
WBC # BLD: 5.11 K/UL — SIGNIFICANT CHANGE UP (ref 3.8–10.5)
WBC # FLD AUTO: 5.11 K/UL — SIGNIFICANT CHANGE UP (ref 3.8–10.5)

## 2021-03-04 PROCEDURE — 86900 BLOOD TYPING SEROLOGIC ABO: CPT

## 2021-03-04 PROCEDURE — 85730 THROMBOPLASTIN TIME PARTIAL: CPT

## 2021-03-04 PROCEDURE — 83036 HEMOGLOBIN GLYCOSYLATED A1C: CPT

## 2021-03-04 PROCEDURE — 87641 MR-STAPH DNA AMP PROBE: CPT

## 2021-03-04 PROCEDURE — 93005 ELECTROCARDIOGRAM TRACING: CPT

## 2021-03-04 PROCEDURE — 86850 RBC ANTIBODY SCREEN: CPT

## 2021-03-04 PROCEDURE — 93010 ELECTROCARDIOGRAM REPORT: CPT

## 2021-03-04 PROCEDURE — 85025 COMPLETE CBC W/AUTO DIFF WBC: CPT

## 2021-03-04 PROCEDURE — 86901 BLOOD TYPING SEROLOGIC RH(D): CPT

## 2021-03-04 PROCEDURE — 51798 US URINE CAPACITY MEASURE: CPT

## 2021-03-04 PROCEDURE — 81003 URINALYSIS AUTO W/O SCOPE: CPT | Mod: QW

## 2021-03-04 PROCEDURE — 80048 BASIC METABOLIC PNL TOTAL CA: CPT

## 2021-03-04 PROCEDURE — 99204 OFFICE O/P NEW MOD 45 MIN: CPT

## 2021-03-04 PROCEDURE — 87640 STAPH A DNA AMP PROBE: CPT

## 2021-03-04 PROCEDURE — 82040 ASSAY OF SERUM ALBUMIN: CPT

## 2021-03-04 PROCEDURE — 99213 OFFICE O/P EST LOW 20 MIN: CPT | Mod: 25

## 2021-03-04 PROCEDURE — 36415 COLL VENOUS BLD VENIPUNCTURE: CPT

## 2021-03-04 PROCEDURE — 85610 PROTHROMBIN TIME: CPT

## 2021-03-04 NOTE — H&P PST ADULT - ASSESSMENT
65 year old woman with history of OA presents to [facility] for preprocedure exam. Patient is for planned right total shoulder replacement with Dr Cutler on 3/16.       Plan:  - PST instructions given ; NPO status instructions to be given by ASU .  - Pt instructed to take following meds with sip of water : myrbetriq   - Pt instructed to take routine evening medications unless indicated .  -  Stop NSAIDS ( Aspirin Alev Motrin Mobic Diclofenac), herbal supplements , MVI , Vitamin fish oil 7 days prior to surgery  unless   directed by surgeon or cardiologist;   - Medical Optimization  with Dr Blankenship  - EZ wash instructions given & mupirocin instructions given. Instructed to use Mupirocin on ly if he gets a phone call from [facility] staff   - Labs EKG CXR as per surgeon request. CXRAY from 10/20   -  Pt instructed to self quarantine .  - Covid Testing scheduled. Pt notified and aware.      CAPRINI SCORE [CLOT]    AGE RELATED RISK FACTORS                                                       MOBILITY RELATED FACTORS  [ ] Age 41-60 years                                            (1 Point)                  [ ] Bed rest                                                        (1 Point)  [x ] Age: 61-74 years                                           (2 Points)                 [ ] Plaster cast                                                   (2 Points)  [ ] Age> 75 years                                              (3 Points)                 [ ] Bed bound for more than 72 hours                 (2 Points)    DISEASE RELATED RISK FACTORS                                               GENDER SPECIFIC FACTORS  [ ] Edema in the lower extremities                       (1 Point)                  [ ] Pregnancy                                                     (1 Point)  [ ] Varicose veins                                               (1 Point)                  [ ] Post-partum < 6 weeks                                   (1 Point)             [x ] BMI > 25 Kg/m2                                            (1 Point)                  [ ] Hormonal therapy  or oral contraception          (1 Point)                 [ ] Sepsis (in the previous month)                        (1 Point)                  [ ] History of pregnancy complications                 (1 point)  [ ] Pneumonia or serious lung disease                                               [ ] Unexplained or recurrent                     (1 Point)           (in the previous month)                               (1 Point)  [ ] Abnormal pulmonary function test                     (1 Point)                 SURGERY RELATED RISK FACTORS  [ ] Acute myocardial infarction                              (1 Point)                 [ ]  Section                                             (1 Point)  [ ] Congestive heart failure (in the previous month)  (1 Point)               [ ] Minor surgery                                                  (1 Point)   [ ] Inflammatory bowel disease                             (1 Point)                 [ ] Arthroscopic surgery                                        (2 Points)  [ ] Central venous access                                      (2 Points)                [ ] General surgery lasting more than 45 minutes   (2 Points)       [ ] Stroke (in the previous month)                          (5 Points)               [ x] Elective arthroplasty                                         (5 Points)            [ ] malignancy present or previous                      (2 points)                                                                                                                                   HEMATOLOGY RELATED FACTORS                                                 TRAUMA RELATED RISK FACTORS  [ ] Prior episodes of VTE                                     (3 Points)                 [ ] Fracture of the hip, pelvis, or leg                       (5 Points)  [ ] Positive family history for VTE                         (3 Points)                 [ ] Acute spinal cord injury (in the previous month)  (5 Points)  [ ] Prothrombin 13145 A                                     (3 Points)                 [ ] Paralysis  (less than 1 month)                             (5 Points)  [ ] Factor V Leiden                                             (3 Points)                  [ ] Multiple Trauma within 1 month                        (5 Points)  [ ] Lupus anticoagulants                                     (3 Points)                                                           [ ] Anticardiolipin antibodies                               (3 Points)                                                       [ ] High homocysteine in the blood                      (3 Points)                                             [ ] Other congenital or acquired thrombophilia      (3 Points)                                                [ ] Heparin induced thrombocytopenia                  (3 Points)                                          Total Score [     8    ]

## 2021-03-04 NOTE — HISTORY OF PRESENT ILLNESS
Denies known Latex allergy or symptoms of Latex sensitivity.   Medications reviewed with patient:No changes made.  Tobacco use verified.  Medical and Surgical history reviewed: No changes made.      [facility]   [facility] [address] [place] [address] [address] AT [facility]  [address] [place] [place] [address]  Phone: [phone] Fax: [phone]        Refills needed? no  Letter for work/school needed? no    Chief Complaint   Patient presents with   • Establish Care   • Wrist     Right wrist no injury              [FreeTextEntry1] : Took mybetric 25mg and felt it did not do anything to improve her urinary symptoms. She had not side effects. She has done 1 session of PT, having her 2nd one tomorrow. Was taking it at night and her more bothersome symptoms are later afternoon.

## 2021-03-04 NOTE — H&P PST ADULT - NEGATIVE GENERAL SYMPTOMS
Patient complains of right upper abdominal and right flank pain.   +nausea/vomting no fever/no chills

## 2021-03-04 NOTE — H&P PST ADULT - HEALTH CARE MAINTENANCE
Denies travel outside of state or country in the last 14 days.   Denies contact with known Coronavirus positive person.  Denies fever, chills, cough, congestion, runny nose, SOB or difficulty breathing, fatigue, muscle or body ache, headache. loss of taste or smell, N/V/D.    receive flu shot this year

## 2021-03-04 NOTE — H&P PST ADULT - HISTORY OF PRESENT ILLNESS
65 year old woman with history of OA presents to [facility] for preprocedure exam. Patient is for planned right total shoulder replacement with Dr Cutler on 3/16. patient denies sustaining an injury to shoulder- stated that it's likely from wear and tear. She takes tylenol for pain, pain is worse with activity and with some relief at rest. Pain at times wakes her up at night.

## 2021-03-04 NOTE — H&P PST ADULT - NSICDXPASTMEDICALHX_GEN_ALL_CORE_FT
PAST MEDICAL HISTORY:  Anxiety and depression     Incomplete bladder emptying     Osteoarthritis of both knees bilateral replacement    Osteoarthritis of right shoulder     Osteopenia     RBBB history of  "for 30 years"    Right ovarian cyst     Sleep apnea, obstructive does not use machine    Urinary incontinence

## 2021-03-04 NOTE — DISCUSSION/SUMMARY
[FreeTextEntry1] : Bladder scan and BP good today. Will try 50mg samples of mybetriq and will switch to daytime dosing. Continue with PT. Having shoulder replacement in a few weeks. We discussed other 3rd line options. Followup in 4-6 weeks. She is going to call her insurance to see if it is covered and will call us if she thinks it is helpful while she is recovering from her shoulder surgery.

## 2021-03-05 DIAGNOSIS — Z01.818 ENCOUNTER FOR OTHER PREPROCEDURAL EXAMINATION: ICD-10-CM

## 2021-03-05 DIAGNOSIS — M19.011 PRIMARY OSTEOARTHRITIS, RIGHT SHOULDER: ICD-10-CM

## 2021-03-05 LAB
MRSA PCR RESULT.: SIGNIFICANT CHANGE UP
S AUREUS DNA NOSE QL NAA+PROBE: DETECTED

## 2021-03-12 DIAGNOSIS — Z01.818 ENCOUNTER FOR OTHER PREPROCEDURAL EXAMINATION: ICD-10-CM

## 2021-03-13 ENCOUNTER — APPOINTMENT (OUTPATIENT)
Dept: DISASTER EMERGENCY | Facility: CLINIC | Age: 66
End: 2021-03-13

## 2021-03-14 LAB — SARS-COV-2 N GENE NPH QL NAA+PROBE: NOT DETECTED

## 2021-03-15 RX ORDER — SODIUM CHLORIDE 9 MG/ML
1000 INJECTION, SOLUTION INTRAVENOUS
Refills: 0 | Status: DISCONTINUED | OUTPATIENT
Start: 2021-03-16 | End: 2021-03-16

## 2021-03-15 RX ORDER — HYDROMORPHONE HYDROCHLORIDE 2 MG/ML
0.5 INJECTION INTRAMUSCULAR; INTRAVENOUS; SUBCUTANEOUS
Refills: 0 | Status: DISCONTINUED | OUTPATIENT
Start: 2021-03-16 | End: 2021-03-16

## 2021-03-15 RX ORDER — ONDANSETRON 8 MG/1
4 TABLET, FILM COATED ORAL ONCE
Refills: 0 | Status: DISCONTINUED | OUTPATIENT
Start: 2021-03-16 | End: 2021-03-16

## 2021-03-15 RX ORDER — FENTANYL CITRATE 50 UG/ML
50 INJECTION INTRAVENOUS
Refills: 0 | Status: DISCONTINUED | OUTPATIENT
Start: 2021-03-16 | End: 2021-03-16

## 2021-03-16 ENCOUNTER — TRANSCRIPTION ENCOUNTER (OUTPATIENT)
Age: 66
End: 2021-03-16

## 2021-03-16 ENCOUNTER — RESULT REVIEW (OUTPATIENT)
Age: 66
End: 2021-03-16

## 2021-03-16 ENCOUNTER — INPATIENT (INPATIENT)
Facility: HOSPITAL | Age: 66
LOS: 1 days | Discharge: HOME | DRG: 483 | End: 2021-03-18
Attending: ORTHOPAEDIC SURGERY | Admitting: ORTHOPAEDIC SURGERY
Payer: MEDICARE

## 2021-03-16 VITALS
TEMPERATURE: 97 F | DIASTOLIC BLOOD PRESSURE: 78 MMHG | HEART RATE: 66 BPM | OXYGEN SATURATION: 100 % | RESPIRATION RATE: 16 BRPM | WEIGHT: 188.5 LBS | HEIGHT: 66 IN | SYSTOLIC BLOOD PRESSURE: 118 MMHG

## 2021-03-16 DIAGNOSIS — Z96.659 PRESENCE OF UNSPECIFIED ARTIFICIAL KNEE JOINT: Chronic | ICD-10-CM

## 2021-03-16 DIAGNOSIS — M19.011 PRIMARY OSTEOARTHRITIS, RIGHT SHOULDER: ICD-10-CM

## 2021-03-16 DIAGNOSIS — Z98.890 OTHER SPECIFIED POSTPROCEDURAL STATES: Chronic | ICD-10-CM

## 2021-03-16 LAB
ANION GAP SERPL CALC-SCNC: 4 MMOL/L — LOW (ref 5–17)
BUN SERPL-MCNC: 16 MG/DL — SIGNIFICANT CHANGE UP (ref 7–23)
CALCIUM SERPL-MCNC: 8.9 MG/DL — SIGNIFICANT CHANGE UP (ref 8.5–10.1)
CHLORIDE SERPL-SCNC: 109 MMOL/L — HIGH (ref 96–108)
CO2 SERPL-SCNC: 29 MMOL/L — SIGNIFICANT CHANGE UP (ref 22–31)
CREAT SERPL-MCNC: 0.87 MG/DL — SIGNIFICANT CHANGE UP (ref 0.5–1.3)
GLUCOSE SERPL-MCNC: 107 MG/DL — HIGH (ref 70–99)
HCT VFR BLD CALC: 40.7 % — SIGNIFICANT CHANGE UP (ref 34.5–45)
HGB BLD-MCNC: 13.2 G/DL — SIGNIFICANT CHANGE UP (ref 11.5–15.5)
MCHC RBC-ENTMCNC: 29.7 PG — SIGNIFICANT CHANGE UP (ref 27–34)
MCHC RBC-ENTMCNC: 32.4 GM/DL — SIGNIFICANT CHANGE UP (ref 32–36)
MCV RBC AUTO: 91.7 FL — SIGNIFICANT CHANGE UP (ref 80–100)
PLATELET # BLD AUTO: 189 K/UL — SIGNIFICANT CHANGE UP (ref 150–400)
POTASSIUM SERPL-MCNC: 4.2 MMOL/L — SIGNIFICANT CHANGE UP (ref 3.5–5.3)
POTASSIUM SERPL-SCNC: 4.2 MMOL/L — SIGNIFICANT CHANGE UP (ref 3.5–5.3)
RBC # BLD: 4.44 M/UL — SIGNIFICANT CHANGE UP (ref 3.8–5.2)
RBC # FLD: 12.2 % — SIGNIFICANT CHANGE UP (ref 10.3–14.5)
SODIUM SERPL-SCNC: 142 MMOL/L — SIGNIFICANT CHANGE UP (ref 135–145)
WBC # BLD: 6.93 K/UL — SIGNIFICANT CHANGE UP (ref 3.8–10.5)
WBC # FLD AUTO: 6.93 K/UL — SIGNIFICANT CHANGE UP (ref 3.8–10.5)

## 2021-03-16 PROCEDURE — 73030 X-RAY EXAM OF SHOULDER: CPT | Mod: RT

## 2021-03-16 PROCEDURE — C1776: CPT

## 2021-03-16 PROCEDURE — C1713: CPT

## 2021-03-16 PROCEDURE — 97161 PT EVAL LOW COMPLEX 20 MIN: CPT | Mod: GP

## 2021-03-16 PROCEDURE — 23472 RECONSTRUCT SHOULDER JOINT: CPT | Mod: AS

## 2021-03-16 PROCEDURE — 80048 BASIC METABOLIC PNL TOTAL CA: CPT

## 2021-03-16 PROCEDURE — 85027 COMPLETE CBC AUTOMATED: CPT

## 2021-03-16 PROCEDURE — 73030 X-RAY EXAM OF SHOULDER: CPT | Mod: 26,RT

## 2021-03-16 PROCEDURE — 99223 1ST HOSP IP/OBS HIGH 75: CPT

## 2021-03-16 PROCEDURE — 88304 TISSUE EXAM BY PATHOLOGIST: CPT

## 2021-03-16 PROCEDURE — 97116 GAIT TRAINING THERAPY: CPT | Mod: GP

## 2021-03-16 PROCEDURE — 82962 GLUCOSE BLOOD TEST: CPT

## 2021-03-16 PROCEDURE — 97530 THERAPEUTIC ACTIVITIES: CPT | Mod: GP

## 2021-03-16 PROCEDURE — 97110 THERAPEUTIC EXERCISES: CPT | Mod: GP

## 2021-03-16 PROCEDURE — 88304 TISSUE EXAM BY PATHOLOGIST: CPT | Mod: 26

## 2021-03-16 PROCEDURE — 99219: CPT

## 2021-03-16 PROCEDURE — 36415 COLL VENOUS BLD VENIPUNCTURE: CPT

## 2021-03-16 RX ORDER — OXYCODONE HYDROCHLORIDE 5 MG/1
1 TABLET ORAL
Qty: 20 | Refills: 0
Start: 2021-03-16 | End: 2021-03-20

## 2021-03-16 RX ORDER — OXYCODONE HYDROCHLORIDE 5 MG/1
5 TABLET ORAL EVERY 4 HOURS
Refills: 0 | Status: DISCONTINUED | OUTPATIENT
Start: 2021-03-16 | End: 2021-03-17

## 2021-03-16 RX ORDER — POLYETHYLENE GLYCOL 3350 17 G/17G
17 POWDER, FOR SOLUTION ORAL
Qty: 1 | Refills: 0
Start: 2021-03-16 | End: 2021-03-29

## 2021-03-16 RX ORDER — ACETAMINOPHEN 500 MG
2 TABLET ORAL
Qty: 1 | Refills: 0
Start: 2021-03-16 | End: 2021-03-29

## 2021-03-16 RX ORDER — ASCORBIC ACID 60 MG
500 TABLET,CHEWABLE ORAL
Refills: 0 | Status: DISCONTINUED | OUTPATIENT
Start: 2021-03-16 | End: 2021-03-18

## 2021-03-16 RX ORDER — PANTOPRAZOLE SODIUM 20 MG/1
40 TABLET, DELAYED RELEASE ORAL ONCE
Refills: 0 | Status: COMPLETED | OUTPATIENT
Start: 2021-03-16 | End: 2021-03-16

## 2021-03-16 RX ORDER — ENOXAPARIN SODIUM 100 MG/ML
40 INJECTION SUBCUTANEOUS DAILY
Refills: 0 | Status: DISCONTINUED | OUTPATIENT
Start: 2021-03-16 | End: 2021-03-18

## 2021-03-16 RX ORDER — FERROUS SULFATE 325(65) MG
325 TABLET ORAL DAILY
Refills: 0 | Status: DISCONTINUED | OUTPATIENT
Start: 2021-03-16 | End: 2021-03-18

## 2021-03-16 RX ORDER — CHOLECALCIFEROL (VITAMIN D3) 125 MCG
2000 CAPSULE ORAL DAILY
Refills: 0 | Status: DISCONTINUED | OUTPATIENT
Start: 2021-03-16 | End: 2021-03-18

## 2021-03-16 RX ORDER — POLYETHYLENE GLYCOL 3350 17 G/17G
17 POWDER, FOR SOLUTION ORAL AT BEDTIME
Refills: 0 | Status: DISCONTINUED | OUTPATIENT
Start: 2021-03-16 | End: 2021-03-18

## 2021-03-16 RX ORDER — SCOPALAMINE 1 MG/3D
1 PATCH, EXTENDED RELEASE TRANSDERMAL ONCE
Refills: 0 | Status: COMPLETED | OUTPATIENT
Start: 2021-03-16 | End: 2021-03-16

## 2021-03-16 RX ORDER — SENNA PLUS 8.6 MG/1
2 TABLET ORAL AT BEDTIME
Refills: 0 | Status: DISCONTINUED | OUTPATIENT
Start: 2021-03-16 | End: 2021-03-18

## 2021-03-16 RX ORDER — OXYCODONE HYDROCHLORIDE 5 MG/1
10 TABLET ORAL EVERY 4 HOURS
Refills: 0 | Status: DISCONTINUED | OUTPATIENT
Start: 2021-03-16 | End: 2021-03-17

## 2021-03-16 RX ORDER — OXYCODONE HYDROCHLORIDE 5 MG/1
20 TABLET ORAL ONCE
Refills: 0 | Status: DISCONTINUED | OUTPATIENT
Start: 2021-03-16 | End: 2021-03-16

## 2021-03-16 RX ORDER — ENOXAPARIN SODIUM 100 MG/ML
40 INJECTION SUBCUTANEOUS
Qty: 10 | Refills: 0
Start: 2021-03-16 | End: 2021-03-25

## 2021-03-16 RX ORDER — SODIUM CHLORIDE 9 MG/ML
1000 INJECTION, SOLUTION INTRAVENOUS
Refills: 0 | Status: DISCONTINUED | OUTPATIENT
Start: 2021-03-16 | End: 2021-03-18

## 2021-03-16 RX ORDER — FOLIC ACID 0.8 MG
1 TABLET ORAL DAILY
Refills: 0 | Status: DISCONTINUED | OUTPATIENT
Start: 2021-03-16 | End: 2021-03-18

## 2021-03-16 RX ORDER — OXYCODONE HYDROCHLORIDE 5 MG/1
10 TABLET ORAL ONCE
Refills: 0 | Status: DISCONTINUED | OUTPATIENT
Start: 2021-03-16 | End: 2021-03-16

## 2021-03-16 RX ORDER — CITALOPRAM 10 MG/1
40 TABLET, FILM COATED ORAL DAILY
Refills: 0 | Status: DISCONTINUED | OUTPATIENT
Start: 2021-03-16 | End: 2021-03-18

## 2021-03-16 RX ORDER — PANTOPRAZOLE SODIUM 20 MG/1
40 TABLET, DELAYED RELEASE ORAL DAILY
Refills: 0 | Status: DISCONTINUED | OUTPATIENT
Start: 2021-03-16 | End: 2021-03-18

## 2021-03-16 RX ORDER — ALPRAZOLAM 0.25 MG
1 TABLET ORAL
Qty: 0 | Refills: 0 | DISCHARGE

## 2021-03-16 RX ORDER — ONDANSETRON 8 MG/1
8 TABLET, FILM COATED ORAL EVERY 8 HOURS
Refills: 0 | Status: DISCONTINUED | OUTPATIENT
Start: 2021-03-16 | End: 2021-03-18

## 2021-03-16 RX ORDER — FENOFIBRATE,MICRONIZED 130 MG
145 CAPSULE ORAL DAILY
Refills: 0 | Status: DISCONTINUED | OUTPATIENT
Start: 2021-03-16 | End: 2021-03-18

## 2021-03-16 RX ORDER — CEFAZOLIN SODIUM 1 G
2000 VIAL (EA) INJECTION EVERY 8 HOURS
Refills: 0 | Status: COMPLETED | OUTPATIENT
Start: 2021-03-16 | End: 2021-03-17

## 2021-03-16 RX ORDER — HYDROMORPHONE HYDROCHLORIDE 2 MG/ML
0.5 INJECTION INTRAMUSCULAR; INTRAVENOUS; SUBCUTANEOUS EVERY 4 HOURS
Refills: 0 | Status: COMPLETED | OUTPATIENT
Start: 2021-03-16 | End: 2021-03-23

## 2021-03-16 RX ORDER — CELECOXIB 200 MG/1
200 CAPSULE ORAL EVERY 12 HOURS
Refills: 0 | Status: DISCONTINUED | OUTPATIENT
Start: 2021-03-17 | End: 2021-03-18

## 2021-03-16 RX ORDER — ACETAMINOPHEN 500 MG
650 TABLET ORAL ONCE
Refills: 0 | Status: COMPLETED | OUTPATIENT
Start: 2021-03-16 | End: 2021-03-16

## 2021-03-16 RX ORDER — ACETAMINOPHEN 500 MG
975 TABLET ORAL EVERY 8 HOURS
Refills: 0 | Status: DISCONTINUED | OUTPATIENT
Start: 2021-03-16 | End: 2021-03-18

## 2021-03-16 RX ADMIN — PANTOPRAZOLE SODIUM 40 MILLIGRAM(S): 20 TABLET, DELAYED RELEASE ORAL at 10:49

## 2021-03-16 RX ADMIN — OXYCODONE HYDROCHLORIDE 20 MILLIGRAM(S): 5 TABLET ORAL at 10:49

## 2021-03-16 RX ADMIN — ONDANSETRON 8 MILLIGRAM(S): 8 TABLET, FILM COATED ORAL at 22:13

## 2021-03-16 RX ADMIN — Medication 975 MILLIGRAM(S): at 22:21

## 2021-03-16 RX ADMIN — ENOXAPARIN SODIUM 40 MILLIGRAM(S): 100 INJECTION SUBCUTANEOUS at 22:13

## 2021-03-16 RX ADMIN — Medication 650 MILLIGRAM(S): at 10:50

## 2021-03-16 RX ADMIN — SCOPALAMINE 1 PATCH: 1 PATCH, EXTENDED RELEASE TRANSDERMAL at 11:05

## 2021-03-16 RX ADMIN — FENTANYL CITRATE 50 MICROGRAM(S): 50 INJECTION INTRAVENOUS at 13:18

## 2021-03-16 RX ADMIN — FENTANYL CITRATE 50 MICROGRAM(S): 50 INJECTION INTRAVENOUS at 13:35

## 2021-03-16 RX ADMIN — SODIUM CHLORIDE 75 MILLILITER(S): 9 INJECTION, SOLUTION INTRAVENOUS at 13:39

## 2021-03-16 RX ADMIN — CITALOPRAM 40 MILLIGRAM(S): 10 TABLET, FILM COATED ORAL at 22:11

## 2021-03-16 RX ADMIN — SCOPALAMINE 1 PATCH: 1 PATCH, EXTENDED RELEASE TRANSDERMAL at 11:04

## 2021-03-16 RX ADMIN — Medication 100 MILLIGRAM(S): at 20:35

## 2021-03-16 RX ADMIN — Medication 975 MILLIGRAM(S): at 22:11

## 2021-03-16 RX ADMIN — OXYCODONE HYDROCHLORIDE 10 MILLIGRAM(S): 5 TABLET ORAL at 22:17

## 2021-03-16 RX ADMIN — OXYCODONE HYDROCHLORIDE 10 MILLIGRAM(S): 5 TABLET ORAL at 13:19

## 2021-03-16 RX ADMIN — OXYCODONE HYDROCHLORIDE 10 MILLIGRAM(S): 5 TABLET ORAL at 22:47

## 2021-03-16 RX ADMIN — Medication 145 MILLIGRAM(S): at 22:11

## 2021-03-16 RX ADMIN — OXYCODONE HYDROCHLORIDE 10 MILLIGRAM(S): 5 TABLET ORAL at 13:50

## 2021-03-16 NOTE — DISCHARGE NOTE PROVIDER - NSDCFUSCHEDAPPT_GEN_ALL_CORE_FT
HILLARY ETIENNE ; 03/16/2021 ; [facility] PreAdmits  HILLARY ETIENNE ; 05/20/2021 ; [facility] [address] HILLARY ETIENNE ; 05/20/2021 ; [facility] [address]

## 2021-03-16 NOTE — DISCHARGE NOTE PROVIDER - NSDCFUADDINST_GEN_ALL_CORE_FT
Right Total Shoulder Discharge Instructions     1. Activity: No Aggressive ROM until cleared by Dr. Cutler. Maintain sling at all times EXCEPT to straighten elbow a few times daily. Elevate hand and wiggle fingers. Do not start any outpatient PT until you see Dr. Cutler in the office.    2. Call with fever over 101, wound redness, drainage.    3. Wound care: Do not remove or change dressing unless saturated.  IF so, apply dry gauze, tegaderm. Be careful not to removed mesh that is glued to the wound. There are no sutures or staples to remove.    4. Continue to apply ICE packs.    5. DVT PE Prophylaxis: per Anticoagulation Team. See med Rec.    6. Follow Up: Orthopedics, Dr. CUTLER 10-14 days in office. Call to schedule. If going home, eRx sent to your pharmacy for .

## 2021-03-16 NOTE — PHYSICAL THERAPY INITIAL EVALUATION ADULT - PERTINENT HX OF CURRENT PROBLEM, REHAB EVAL
66yo Female with Hx of osteoarthritis of the right shoulder, presents for TSA 2/2 worsen Rt shoulder pain and diability.

## 2021-03-16 NOTE — PHYSICAL THERAPY INITIAL EVALUATION ADULT - PLANNED THERAPY INTERVENTIONS, PT EVAL
GOAL: Pt will perform 4 stairs with or without U HR as needed within 4weeks./balance training/gait training/strengthening/transfer training

## 2021-03-16 NOTE — CONSULT NOTE ADULT - ASSESSMENT
This is a 65 year old female s/p right shoulder replacement with moderate risk for VTE due to age, BMI, and impaired mobility s/p surgery. She is low risk for bleeding.    Plan:  ::Lovenox 40 mg SQ daily x 7-10 days (script sent to pharmacy)  ::Daily CBC/BMP  ::Venodynes b/l  ::Amb per PT, as tolerated    Thank you for this consult, will continue to follow.  Dispo: Home This is a 65 year old female s/p right shoulder replacement with moderate risk for VTE due to age, BMI, and impaired mobility s/p surgery. She is low risk for bleeding.    Plan:  ::Lovenox 40 mg SQ daily x 7-10 days (script sent to pharmacy)  ::Daily CBC/BMP  ::Venodynes b/l  ::Amb per PT, as tolerated    Thank you for this consult, will continue to follow.  Dispo: Home  Script sent- copay $48

## 2021-03-16 NOTE — DISCHARGE NOTE PROVIDER - HOSPITAL COURSE
H&P:  Pt is a 65y Female    PAST MEDICAL & SURGICAL HISTORY:  Anxiety and depression    Osteopenia    Osteoarthritis of right shoulder    Osteoarthritis of both knees  bilateral replacement    Incomplete bladder emptying    Urinary incontinence    Sleep apnea, obstructive  does not use machine    RBBB  history of  &quot;for 30 years&quot;    Right ovarian cyst    S/P knee replacement  left and right    S/P UPPP (uvulopalatopharyngoplasty)  2014 with tonsillectomy       Now s/p Right Total Shoulder Arthroplasty. Pt is afebrile with stable vital signs. Pain is controlled. Alert and Oriented. Exam reveals intact AIN/PIN, wrist flexion and wrist extension, 2+Radial Pulse. Dressing is clean and dry with telfa/tegaderm on.      Hospital Course:  Patient presented to [facility] medically cleared for Right Total Shoulder Replacement Surgery, having failed outpatient non-operative conservative management. Prophylactic antibiotics were started before the procedure and continued for 24 hours. They were admitted after surgery to the orthopedic floor.   There were no complications during the hospital stay.     Routine consults were obtained from the Anticoagulation Team for DVT/PE prophylaxis, from Physical Therapy for twice daily PT ROM limited to full forward elevation, Internal rotation to Chest, external rotation to neutral, no extension. Consult to Hospitalist for Medical Co-management. Patient was placed on anticoagulation post-op.  Pertinent home medications were continued.  Daily labs were followed.      On POD 0 there were no overnight events and the pt was OOB with PT. POD 1, PT was continued, and anticipate POD 1 DC to home. The orthopedic Attending is aware and agrees.

## 2021-03-16 NOTE — DISCHARGE NOTE PROVIDER - CARE PROVIDER_API CALL
Genaro Cutler)  Orthopaedic Surgery  [address] [place] [address] [place] [address]  Phone: [phone]  Fax: [phone]  Follow Up Time:

## 2021-03-16 NOTE — PROGRESS NOTE ADULT - SUBJECTIVE AND OBJECTIVE BOX
Orthopedic Post-op Check    POD 0 Total Shoulder Arthroplasty  Pain controlled in PACU. Block in effect for pain with some tingling in the fingers however she is able to wiggle fingers and extend her wrist. Wiggling fingers well.  No n/v.    Vital Signs Last 24 Hrs  T(C): 36.8 (03-16-21 @ 14:35), Max: 36.8 (03-16-21 @ 14:35)  T(F): 98.2 (03-16-21 @ 14:35), Max: 98.2 (03-16-21 @ 14:35)  HR: 80 (03-16-21 @ 14:35) (66 - 80)  BP: 119/76 (03-16-21 @ 14:35) (113/71 - 121/67)  BP(mean): 83 (03-16-21 @ 14:00) (80 - 86)  RR: 18 (03-16-21 @ 14:35) (14 - 21)  SpO2: 98% (03-16-21 @ 14:35) (98% - 100%)                        13.2   6.93  )-----------( 189      ( 16 Mar 2021 13:49 )             40.7     16 Mar 2021 13:49    142    |  109    |  16     ----------------------------<  107    4.2     |  29     |  0.87     Ca    8.9        16 Mar 2021 13:49          NAD AAOx3  Dressing Clean and dry  Shoulder Immobilizer in place  AROM: Intact AIN PIN, wrist ext/fex.  SILT all 5 digits with some tingling present (due to nerve block)  2+ Radial Pulse        A/P:  Stable POD 0 RIGHT Total Shoulder Arthroplasty  -Analgesia  -ppx ABX  -DVT PE ppx  -OOB ambulate  -ROM to shoulder restricted to: Internal to chest wall, External to neutral, Full forward elevation, No extension.

## 2021-03-16 NOTE — DISCHARGE NOTE PROVIDER - NSDCMRMEDTOKEN_GEN_ALL_CORE_FT
CeleXA 40 mg oral tablet: 1 tab(s) orally once a day  enoxaparin 40 mg/0.4 mL injectable solution: Inject syringe into abdomen once daily as directed by [facility] [phone] MDD:40mg  fenofibrate 160 mg oral tablet: 1 tab(s) orally once a day  MiraLax oral powder for reconstitution: 17 gram(s) orally once a day, As Needed -for constipation MDD:1  Myrbetriq 50 mg oral tablet, extended release: 1 tab(s) orally once a day  oxyCODONE 5 mg oral tablet: 1 tab(s) orally every 6 hours MDD:6  Tylenol Extra Strength 500 mg oral tablet: 2 tab(s) orally 3 times a day, As Needed MDD:6  Vitamin D3 2000 intl units (50 mcg) oral tablet:   Xanax 0.5 mg oral tablet: 1 tab(s) orally once a day, As Needed DO NOT take this with Oxycodone EVER. Can stop breathing if you take both.

## 2021-03-16 NOTE — CONSULT NOTE ADULT - SUBJECTIVE AND OBJECTIVE BOX
PCP:  Derek Blankenship  Ortho:  [facility]    CHIEF COMPLAINT:   right shoulder pain    HISTORY OF THE PRESENT ILLNESS:  65 F with Hx of osteoarthritis of the right shoulder, has been experiencing progressive right arm and shoulder pain for the past 3-4 years.  She has tried conservative therapies to manage the pain including PT and injections but they have not overall helped.  She experiences pain at rest and with movement and motion of the right arm/shoulder.  She cannot lift her right arm beyond 90 degrees.  Her pain has limited her ability to complete her activities of daily living.  The patient was seen by her orthopedist and a decision was made to undergo right total shoulder replacement surgery.  The patient is now post-op and in the PACU where I am seeing her.  The hospitalist service has been consulted to assist with post-op medical management.      At this time, the patient reports mild pain over the right shoulder, but denies any shortness of breath, nausea, vomiting, abdominal pain, chest pain.    PAST MEDICAL HISTORY:  ÁNGEL (not on home CPAP)  Hyperlipidemia  Anxiety/Depression  Urinary Incontinence  Osteoarthritis of the shoulder  Osteopenia    PAST SURGICAL HISTORY:  s/p right knee replacement  s/p left knee replacement  s/p uvuloplasty and tonsillectomy    FAMILY HISTORY:    Mother - sepsis    SOCIAL HISTORY:  former smoker, occasional ETOH, , no drugs, former RN    REVIEW OF SYSTEMS:   All 10 systems reviewed in detailed and found to be negative with the exception of what has already been described above    MEDICATIONS  (STANDING):  lactated ringers. 1000 milliLiter(s) (75 mL/Hr) IV Continuous <Continuous>    MEDICATIONS  (PRN):  fentaNYL    Injectable 50 MICROGram(s) IV Push every 10 minutes PRN Severe Pain (7 - 10)  HYDROmorphone  Injectable 0.5 milliGRAM(s) IV Push every 10 minutes PRN Moderate Pain (4 - 6)  ondansetron Injectable 4 milliGRAM(s) IV Push once PRN Nausea and/or Vomiting    VITALS:  T(F): 98.1 (03-16-21 @ 14:00), Max: 98.1 (03-16-21 @ 14:00)  HR: 70 (03-16-21 @ 14:00) (66 - 70)  BP: 120/71 (03-16-21 @ 14:00) (113/71 - 121/67)  RR: 16 (03-16-21 @ 14:00) (14 - 21)  SpO2: 100% (03-16-21 @ 14:00) (100% - 100%)    I&O's Summary    16 Mar 2021 07:01  -  16 Mar 2021 14:09  --------------------------------------------------------  IN: 1395 mL / OUT: 0 mL / NET: 1395 mL    CAPILLARY BLOOD GLUCOSE  POCT Blood Glucose.: 107 mg/dL (16 Mar 2021 13:04)  POCT Blood Glucose.: 117 mg/dL (16 Mar 2021 10:19)    PHYSICAL EXAM:  HEENT:  pupils equal and reactive, EOMI, no oropharyngeal lesions, erythema, exudates, oral thrush  NECK:   supple, no carotid bruits, no palpable lymph nodes, no thyromegaly  CV:  +S1, +S2, regular, no murmurs or rubs  RESP:   lungs clear to auscultation bilaterally, no wheezing, rales, rhonchi, good air entry bilaterally  BREAST:  not examined  GI:  abdomen soft, non-tender, non-distended, normal BS, no bruits, no abdominal masses, no palpable masses  RECTAL:  not examined  :  not examined  MSK:   normal muscle tone, no atrophy, no rigidity, no contractions  EXT:  Right shoulder swelling with dressing, right arm in slight, can move her right shoulder, arm and fingers  VASCULAR:  pulses equal and symmetric in the upper and lower extremities  NEURO:  AAOX3, no focal neurological deficits, follows all commands, able to move extremities spontaneously  SKIN:  no ulcers, lesions or rashes    LABS:                         13.2   6.93  )-----------( 189      ( 16 Mar 2021 13:49 )             40.7     IMPRESSION:    S/P ELECTIVE RIGHT TOTAL SHOULDER REPLACEMENT, POD #0    ANXIETY / DEPRESSION    HYPERLIPIDEMIA      RECOMMENDATIONS:  -  pain control  -  incentive spirometry  -  neurovascular checks  -  wound care per ortho  -  RUE in sling per ortho  -  resume outpatient meds  -  OOB to chair / mobilize  -  PT evaluation and treatment  -  complete perioperative ABXs  -  advance diet as tolerated  -  DVT prophylaxis  -  anticoagulation consult  -  check labs today and in am  -  will follow with you    * discussed with patient COVID vaccine but she already received her 2 doses of it    d/w patient and PACU RN   PCP:  Derek Blankenship  Ortho:  [facility]ra    CHIEF COMPLAINT:   right shoulder pain    HISTORY OF THE PRESENT ILLNESS:  65 F with Hx of osteoarthritis of the right shoulder, has been experiencing progressive right arm and shoulder pain for the past 3-4 years.  She has tried conservative therapies to manage the pain including PT and injections but they have not overall helped.  She experiences pain at rest and with movement and motion of the right arm/shoulder.  She cannot lift her right arm beyond 90 degrees.  Her pain has limited her ability to complete her activities of daily living.  The patient was seen by her orthopedist and a decision was made to undergo right total shoulder replacement surgery.  The patient is now post-op and in the PACU where I am seeing her.  The hospitalist service has been consulted to assist with post-op medical management.      At this time, the patient reports mild pain over the right shoulder, but denies any shortness of breath, nausea, vomiting, abdominal pain, chest pain.    PAST MEDICAL HISTORY:  ÁNGEL (not on home CPAP)  Hyperlipidemia  Anxiety/Depression  Urinary Incontinence  Osteoarthritis of the shoulder  Osteopenia    PAST SURGICAL HISTORY:  s/p right knee replacement  s/p left knee replacement  s/p uvuloplasty and tonsillectomy    FAMILY HISTORY:    Mother - sepsis    SOCIAL HISTORY:  former smoker, occasional ETOH, , no drugs, former RN    REVIEW OF SYSTEMS:   All 10 systems reviewed in detailed and found to be negative with the exception of what has already been described above    MEDICATIONS  (STANDING):  lactated ringers. 1000 milliLiter(s) (75 mL/Hr) IV Continuous <Continuous>    MEDICATIONS  (PRN):  fentaNYL    Injectable 50 MICROGram(s) IV Push every 10 minutes PRN Severe Pain (7 - 10)  HYDROmorphone  Injectable 0.5 milliGRAM(s) IV Push every 10 minutes PRN Moderate Pain (4 - 6)  ondansetron Injectable 4 milliGRAM(s) IV Push once PRN Nausea and/or Vomiting    VITALS:  T(F): 98.1 (03-16-21 @ 14:00), Max: 98.1 (03-16-21 @ 14:00)  HR: 70 (03-16-21 @ 14:00) (66 - 70)  BP: 120/71 (03-16-21 @ 14:00) (113/71 - 121/67)  RR: 16 (03-16-21 @ 14:00) (14 - 21)  SpO2: 100% (03-16-21 @ 14:00) (100% - 100%)    I&O's Summary    16 Mar 2021 07:01  -  16 Mar 2021 14:09  --------------------------------------------------------  IN: 1395 mL / OUT: 0 mL / NET: 1395 mL    CAPILLARY BLOOD GLUCOSE  POCT Blood Glucose.: 107 mg/dL (16 Mar 2021 13:04)  POCT Blood Glucose.: 117 mg/dL (16 Mar 2021 10:19)    PHYSICAL EXAM:  HEENT:  pupils equal and reactive, EOMI, no oropharyngeal lesions, erythema, exudates, oral thrush  NECK:   supple, no carotid bruits, no palpable lymph nodes, no thyromegaly  CV:  +S1, +S2, regular, no murmurs or rubs  RESP:   lungs clear to auscultation bilaterally, no wheezing, rales, rhonchi, good air entry bilaterally  BREAST:  not examined  GI:  abdomen soft, non-tender, non-distended, normal BS, no bruits, no abdominal masses, no palpable masses  RECTAL:  not examined  :  not examined  MSK:   normal muscle tone, no atrophy, no rigidity, no contractions  EXT:  Right shoulder swelling with dressing, right arm in slight, can move her right shoulder, arm and fingers  VASCULAR:  pulses equal and symmetric in the upper and lower extremities  NEURO:  AAOX3, no focal neurological deficits, follows all commands, able to move extremities spontaneously  SKIN:  no ulcers, lesions or rashes    LABS:                         13.2   6.93  )-----------( 189      ( 16 Mar 2021 13:49 )             40.7     IMPRESSION:    S/P ELECTIVE RIGHT TOTAL SHOULDER REPLACEMENT FOR SEVERE OSTEOARTHRITIS, POD #0    ANXIETY / DEPRESSION    HYPERLIPIDEMIA      RECOMMENDATIONS:  -  pain control  -  incentive spirometry  -  neurovascular checks  -  wound care per ortho  -  RUE in sling per ortho  -  resume outpatient meds  -  OOB to chair / mobilize  -  PT evaluation and treatment  -  complete perioperative ABXs  -  advance diet as tolerated  -  DVT prophylaxis  -  anticoagulation consult  -  check labs today and in am  -  will follow with you    * discussed with patient COVID vaccine but she already received her 2 doses of it    d/w patient and PACU RN

## 2021-03-16 NOTE — PHYSICAL THERAPY INITIAL EVALUATION ADULT - MANUAL MUSCLE TESTING RESULTS, REHAB EVAL
Strength is grossly at least 3+/5 throughout BLE and LUE. RUE not tested 2/2 NWB./grossly assessed due to

## 2021-03-16 NOTE — PHYSICAL THERAPY INITIAL EVALUATION ADULT - NS ASR WT BEARING DETAIL RUE
ROM to shoulder restricted to: Internal to chest wall, External to neutral, Full forward elevation, No extension./nonweight-bearing

## 2021-03-16 NOTE — DISCHARGE NOTE PROVIDER - NSDCCPTREATMENT_GEN_ALL_CORE_FT
PRINCIPAL PROCEDURE  Procedure: Total shoulder arthroplasty  Findings and Treatment: Right Total Shoulder Arthroplasty

## 2021-03-16 NOTE — PHYSICAL THERAPY INITIAL EVALUATION ADULT - ADDITIONAL COMMENTS
Pt reports she will be staying with dtr in law's mother, win private home with 4 steps to enter. Pt reports she was indep without AD prior for amb, and indep with all ADL living alone. Pt reports she will be staying with dtr in law's mother, win private home with 4 steps to enter. Pt reports she was indep without AD prior for amb, and indep with all ADL living alone.    Pt educated on reverse total shoulder arthoplasty movement precautions, falls risk reduction, therex review and the overall impact on the pt's ADLs and safety.

## 2021-03-16 NOTE — CONSULT NOTE ADULT - SUBJECTIVE AND OBJECTIVE BOX
HPI  HPI:  Patient is a 65y old  Female who presents with a chief complaint of elective right total shoulder arthroplasty (16 Mar 2021 14:09)      Consulted by Dr. Cutler   for VTE prophylaxis, risk stratification, and anticoagulation management.    PAST MEDICAL & SURGICAL HISTORY:  Anxiety and depression    Osteopenia    Osteoarthritis of right shoulder    Osteoarthritis of both knees  bilateral replacement    Incomplete bladder emptying    Urinary incontinence    Sleep apnea, obstructive  does not use machine    RBBB  history of  &quot;for 30 years&quot;    Right ovarian cyst    S/P knee replacement  left and right    S/P UPPP (uvulopalatopharyngoplasty)   with tonsillectomy        Interval History:  3/16: Patient seen at bedside on [facility] still reporting numbness in RUE. Has had issues with nausea in past from anesthesia. Asked for scopolamine patch which she reports "Is working very well." Advised that Lovenox indicated for 7-10 days post-op. 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     BMI: 30.4    CrCl:84.2    Incision Time:1142  Procedure End Time:1307  EBL: awaiting op note    Caprini VTE Risk Score:  CAPRINI SCORE  AGE RELATED RISK FACTORS                                                       MOBILITY RELATED FACTORS  [ ] Age 41-60 years                                            (1 Point)                  [ ] Bed rest                                                        (1 Point)  [x ] Age: 61-74 years                                           (2 Points)                [ ] Plaster cast                                                   (2 Points)  [ ] Age= 75 years                                              (3 Points)                 [ ] Bed bound for more than 72 hours                   (2 Points)    DISEASE RELATED RISK FACTORS                                               GENDER SPECIFIC FACTORS  [ ] Edema in the lower extremities                       (1 Point)           [ ] Pregnancy                                                            (1 Point)  [ ] Varicose veins                                               (1 Point)                  [ ] Post-partum < 6 weeks                                      (1 Point)             [x ] BMI > 25 Kg/m2                                            (1 Point)                  [ ] Hormonal therapy or oral contraception       (1 Point)                 [ ] Sepsis (in the previous month)                        (1 Point)             [ ] History of pregnancy complications                (1Point)  [ ] Pneumonia or serious lung disease                                             [ ] Unexplained or recurrent  (=/>3), premature                                 (In the previous month)                               (1 Point)                birth with toxemia or growth-restricted infant (1 Point)  [ ] Abnormal pulmonary function test            (1 Point)                                   SURGERY RELATED RISK FACTORS  [ ] Acute myocardial infarction                       (1 Point)                  [ ]  Section                                         (1 Point)  [ ] Congestive heart failure (in the previous month) (1 Point)   [ ] Minor surgery   lasting <45 minutes       (1 Point)   [ ] Inflammatory bowel disease                             (1 Point)          [ ] Arthroscopic surgery                                  (2 Points)  [ ] Central venous access                                    (2 Points)            [ x] General surgery lasting >45 minutes      (2 Points)       [ ] Stroke (in the previous month)                  (5 Points)            [ ] Elective major lower extremity arthroplasty (5 Points)                                   [  ] Malignancy (present or past include skin melanoma                                          but exclude  basal skin cell)    (2 points)                                      TRAUMA RELATED RISK FACTORS                HEMATOLOGY RELATED FACTORS                                  [ ] Fracture of the hip, pelvis, or leg                       (5 Points)  [ ] Prior episodes of VTE                                     (3 Points)          [ ] Acute spinal cord injury (in the previous month)  (5 Points)  [ ] Positive family history for VTE                         (3 Points)       [ ] Paralysis (less than 1 month)                          (5 Points)  [ ] Prothrombin 40623 A                                      (3 Points)         [ ] Multiple Trauma (within 1month)                 (5Points)                                                                                                                                                                [ ] Factor V Leiden                                          (3 Points)                                OTHER RISK FACTORS                          [ ] Lupus anticoagulants                                     (3 Points)                       [ ] BMI > 40                          (1 Point)                                                         [ ] Anticardiolipin antibodies                                (3 Points)                   [ ] Smoking                              (1Point)                                                [ ] High homocysteine in the blood                      (3 Points)                [  ] Diabetes requiring insulin (1point)                         [ ] Other congenital or acquired thrombophilia       (3 Points)          [  ] Chemotherapy                   (1 Point)  [ ] Heparin induced thrombocytopenia                  (3 Points)             [  ] Blood Transfusion                (1 point)                                                                                                             Total Score [       5   ]                                                                                                                                                                                                                                                                                                                                                                                                                                    IMPROVE Bleeding Risk Score:1.5      Falls Risk:   High (  )  Mod (x )  Low (  )      FAMILY HISTORY:  Family history of sepsis  mother      Denies any personal or familial history of clotting or bleeding disorders.    Allergies    No Known Allergies    Intolerances        REVIEW OF SYSTEMS    (  )Fever	        (  )Constipation	(  )SOB				  (  )Headache   (  )Dysuria  (  )Chills	        (  )Melena	        (  )Dyspnea on exertion (  )Dizziness    (  )Polyuria  (  )Nausea      (  )Hematochezia	(  )Cough                          (  )Syncope      (  )Hematuria  (  )Vomiting   (  )Chest Pain	        (  )Wheezing			  (  )Weakness  (  )Diarrhea    (  )Palpitations	(  )Anorexia			  ( x )Myalgia       ( x  ) Arthralgia    Pertinent positives in HPI and daily subjective. All other systems negative.    Vital Signs Last 24 Hrs  T(C): 36.8 (16 Mar 2021 14:35), Max: 36.8 (16 Mar 2021 14:35)  T(F): 98.2 (16 Mar 2021 14:35), Max: 98.2 (16 Mar 2021 14:35)  HR: 80 (16 Mar 2021 14:35) (66 - 80)  BP: 119/76 (16 Mar 2021 14:35) (113/71 - 121/67)  BP(mean): 83 (16 Mar 2021 14:00) (80 - 86)  RR: 18 (16 Mar 2021 14:35) (14 - 21)  SpO2: 98% (16 Mar 2021 14:35) (98% - 100%)      PHYSICAL EXAM:    Constitutional: Appears Well    Neurological: A& O x 3    Skin: Warm    Respiratory and Thorax: normal effort; Breath sounds: normal; No rales/wheezing/rhonchi  	  Cardiovascular: S1, S2, regular, NMBR	    Gastrointestinal: BS + x 4Q, nontender	    Musculoskeletal:   General Right:   no muscle/joint tenderness,   normal tone, no joint swelling,   ROM: limited	    General Left:   no muscle/joint tenderness,   normal tone, no joint swelling,   ROM: full    Shoulder:  Rt: Drsing CDI; Sling/immob. noted; Cap refill good; Radial pulse +; Sensation intact                Lower extrems:   Right: no calf tenderness              negative kip's sign               + pedal pulses    Left:   no calf tenderness              negative kip's sign               + pedal pulses                          13.2   6.93  )-----------( 189      ( 16 Mar 2021 13:49 )             40.7       03-16    142  |  109<H>  |  16  ----------------------------<  107<H>  4.2   |  29  |  0.87    Ca    8.9      16 Mar 2021 13:49        				    MEDICATIONS  (STANDING):  acetaminophen   Tablet .. 975 milliGRAM(s) Oral every 8 hours  ascorbic acid 500 milliGRAM(s) Oral two times a day  calcium carbonate 1250 mG  + Vitamin D (OsCal 500 + D) 1 Tablet(s) Oral three times a day  ceFAZolin   IVPB 2000 milliGRAM(s) IV Intermittent every 8 hours  cholecalciferol 2000 Unit(s) Oral daily  citalopram 40 milliGRAM(s) Oral daily  enoxaparin Injectable 40 milliGRAM(s) SubCutaneous daily  fenofibrate Tablet 145 milliGRAM(s) Oral daily  ferrous    sulfate 325 milliGRAM(s) Oral daily  folic acid 1 milliGRAM(s) Oral daily  lactated ringers. 1000 milliLiter(s) IV Continuous <Continuous>  multivitamin 1 Tablet(s) Oral daily  pantoprazole    Tablet 40 milliGRAM(s) Oral daily  polyethylene glycol 3350 17 Gram(s) Oral at bedtime  senna 2 Tablet(s) Oral at bedtime      **Current DVT Prophylaxis:    LMWH                   ( x )  Heparin SQ           (  )  Coumadin             (  )  Xarelto                  (  )  Eliquis                   (  )  Venodynes           (x  )  Ambulation          (x  )  UFH                       (  )  ECASA                   (  )  Contraindicated  (  )           HPI:  Patient is a 65y old  Female who presents with a chief complaint of elective right total shoulder arthroplasty (16 Mar 2021 14:09)    Consulted by Dr. Cutler   for VTE prophylaxis, risk stratification, and anticoagulation management.    PAST MEDICAL & SURGICAL HISTORY:  Anxiety and depression    Osteopenia    Osteoarthritis of right shoulder    Osteoarthritis of both knees  bilateral replacement    Incomplete bladder emptying    Urinary incontinence    Sleep apnea, obstructive  does not use machine    RBBB  history of  &quot;for 30 years&quot;    Right ovarian cyst    S/P knee replacement  left and right    S/P UPPP (uvulopalatopharyngoplasty)   with tonsillectomy        Interval History:  3/16: Patient seen at bedside on [facility] still reporting numbness in RUE. Has had issues with nausea in past from anesthesia. Asked for scopolamine patch which she reports "Is working very well." Advised that Lovenox indicated for 7-10 days post-op. 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     BMI: 30.4    CrCl:84.2    Incision Time:1142  Procedure End Time:1307  EBL: awaiting op note    Caprini VTE Risk Score:  CAPRINI SCORE  AGE RELATED RISK FACTORS                                                       MOBILITY RELATED FACTORS  [ ] Age 41-60 years                                            (1 Point)                  [ ] Bed rest                                                        (1 Point)  [x ] Age: 61-74 years                                           (2 Points)                [ ] Plaster cast                                                   (2 Points)  [ ] Age= 75 years                                              (3 Points)                 [ ] Bed bound for more than 72 hours                   (2 Points)    DISEASE RELATED RISK FACTORS                                               GENDER SPECIFIC FACTORS  [ ] Edema in the lower extremities                       (1 Point)           [ ] Pregnancy                                                            (1 Point)  [ ] Varicose veins                                               (1 Point)                  [ ] Post-partum < 6 weeks                                      (1 Point)             [x ] BMI > 25 Kg/m2                                            (1 Point)                  [ ] Hormonal therapy or oral contraception       (1 Point)                 [ ] Sepsis (in the previous month)                        (1 Point)             [ ] History of pregnancy complications                (1Point)  [ ] Pneumonia or serious lung disease                                             [ ] Unexplained or recurrent  (=/>3), premature                                 (In the previous month)                               (1 Point)                birth with toxemia or growth-restricted infant (1 Point)  [ ] Abnormal pulmonary function test            (1 Point)                                   SURGERY RELATED RISK FACTORS  [ ] Acute myocardial infarction                       (1 Point)                  [ ]  Section                                         (1 Point)  [ ] Congestive heart failure (in the previous month) (1 Point)   [ ] Minor surgery   lasting <45 minutes       (1 Point)   [ ] Inflammatory bowel disease                             (1 Point)          [ ] Arthroscopic surgery                                  (2 Points)  [ ] Central venous access                                    (2 Points)            [ x] General surgery lasting >45 minutes      (2 Points)       [ ] Stroke (in the previous month)                  (5 Points)            [ ] Elective major lower extremity arthroplasty (5 Points)                                   [  ] Malignancy (present or past include skin melanoma                                          but exclude  basal skin cell)    (2 points)                                      TRAUMA RELATED RISK FACTORS                HEMATOLOGY RELATED FACTORS                                  [ ] Fracture of the hip, pelvis, or leg                       (5 Points)  [ ] Prior episodes of VTE                                     (3 Points)          [ ] Acute spinal cord injury (in the previous month)  (5 Points)  [ ] Positive family history for VTE                         (3 Points)       [ ] Paralysis (less than 1 month)                          (5 Points)  [ ] Prothrombin 76273 A                                      (3 Points)         [ ] Multiple Trauma (within 1month)                 (5Points)                                                                                                                                                                [ ] Factor V Leiden                                          (3 Points)                                OTHER RISK FACTORS                          [ ] Lupus anticoagulants                                     (3 Points)                       [ ] BMI > 40                          (1 Point)                                                         [ ] Anticardiolipin antibodies                                (3 Points)                   [ ] Smoking                              (1Point)                                                [ ] High homocysteine in the blood                      (3 Points)                [  ] Diabetes requiring insulin (1point)                         [ ] Other congenital or acquired thrombophilia       (3 Points)          [  ] Chemotherapy                   (1 Point)  [ ] Heparin induced thrombocytopenia                  (3 Points)             [  ] Blood Transfusion                (1 point)                                                                                                             Total Score [       5   ]                                                                                                                                                                                                                                                                                                                                                                                                                                    IMPROVE Bleeding Risk Score:1.5      Falls Risk:   High (  )  Mod (x )  Low (  )      FAMILY HISTORY:  Family history of sepsis  mother      Denies any personal or familial history of clotting or bleeding disorders.    Allergies    No Known Allergies    Intolerances        REVIEW OF SYSTEMS    (  )Fever	        (  )Constipation	(  )SOB				  (  )Headache   (  )Dysuria  (  )Chills	        (  )Melena	        (  )Dyspnea on exertion (  )Dizziness    (  )Polyuria  (  )Nausea      (  )Hematochezia	(  )Cough                          (  )Syncope      (  )Hematuria  (  )Vomiting   (  )Chest Pain	        (  )Wheezing			  (  )Weakness  (  )Diarrhea    (  )Palpitations	(  )Anorexia			  ( x )Myalgia       ( x  ) Arthralgia    Pertinent positives in HPI and daily subjective. All other systems negative.    Vital Signs Last 24 Hrs  T(C): 36.8 (16 Mar 2021 14:35), Max: 36.8 (16 Mar 2021 14:35)  T(F): 98.2 (16 Mar 2021 14:35), Max: 98.2 (16 Mar 2021 14:35)  HR: 80 (16 Mar 2021 14:35) (66 - 80)  BP: 119/76 (16 Mar 2021 14:35) (113/71 - 121/67)  BP(mean): 83 (16 Mar 2021 14:00) (80 - 86)  RR: 18 (16 Mar 2021 14:35) (14 - 21)  SpO2: 98% (16 Mar 2021 14:35) (98% - 100%)      PHYSICAL EXAM:    Constitutional: Appears Well    Neurological: A& O x 3    Skin: Warm    Respiratory and Thorax: normal effort; Breath sounds: normal; No rales/wheezing/rhonchi  	  Cardiovascular: S1, S2, regular, NMBR	    Gastrointestinal: BS + x 4Q, nontender	    Musculoskeletal:   General Right:   no muscle/joint tenderness,   normal tone, no joint swelling,   ROM: limited	    General Left:   no muscle/joint tenderness,   normal tone, no joint swelling,   ROM: full    Shoulder:  Rt: Drsing CDI; Sling/immob. noted; Cap refill good; Radial pulse +; Sensation intact                Lower extrems:   Right: no calf tenderness              negative kip's sign               + pedal pulses    Left:   no calf tenderness              negative kip's sign               + pedal pulses                          13.2   6.93  )-----------( 189      ( 16 Mar 2021 13:49 )             40.7       03-16    142  |  109<H>  |  16  ----------------------------<  107<H>  4.2   |  29  |  0.87    Ca    8.9      16 Mar 2021 13:49        				    MEDICATIONS  (STANDING):  acetaminophen   Tablet .. 975 milliGRAM(s) Oral every 8 hours  ascorbic acid 500 milliGRAM(s) Oral two times a day  calcium carbonate 1250 mG  + Vitamin D (OsCal 500 + D) 1 Tablet(s) Oral three times a day  ceFAZolin   IVPB 2000 milliGRAM(s) IV Intermittent every 8 hours  cholecalciferol 2000 Unit(s) Oral daily  citalopram 40 milliGRAM(s) Oral daily  enoxaparin Injectable 40 milliGRAM(s) SubCutaneous daily  fenofibrate Tablet 145 milliGRAM(s) Oral daily  ferrous    sulfate 325 milliGRAM(s) Oral daily  folic acid 1 milliGRAM(s) Oral daily  lactated ringers. 1000 milliLiter(s) IV Continuous <Continuous>  multivitamin 1 Tablet(s) Oral daily  pantoprazole    Tablet 40 milliGRAM(s) Oral daily  polyethylene glycol 3350 17 Gram(s) Oral at bedtime  senna 2 Tablet(s) Oral at bedtime      **Current DVT Prophylaxis:    LMWH                   ( x )  Heparin SQ           (  )  Coumadin             (  )  Xarelto                  (  )  Eliquis                   (  )  Venodynes           (x  )  Ambulation          (x  )  UFH                       (  )  ECASA                   (  )  Contraindicated  (  )

## 2021-03-16 NOTE — DISCHARGE NOTE PROVIDER - NSDCCPCAREPLAN_GEN_ALL_CORE_FT
PRINCIPAL DISCHARGE DIAGNOSIS  Diagnosis: Primary osteoarthritis of right shoulder  Assessment and Plan of Treatment:

## 2021-03-16 NOTE — PHYSICAL THERAPY INITIAL EVALUATION ADULT - PASSIVE RANGE OF MOTION EXAMINATION, REHAB EVAL
R should flex limited 0-110*/Left LE Passive ROM was WFL (within functional limits)/bilateral lower extremity Passive ROM was WFL (within functional limits)

## 2021-03-17 LAB
ANION GAP SERPL CALC-SCNC: 3 MMOL/L — LOW (ref 5–17)
BUN SERPL-MCNC: 17 MG/DL — SIGNIFICANT CHANGE UP (ref 7–23)
CALCIUM SERPL-MCNC: 9.1 MG/DL — SIGNIFICANT CHANGE UP (ref 8.5–10.1)
CHLORIDE SERPL-SCNC: 108 MMOL/L — SIGNIFICANT CHANGE UP (ref 96–108)
CO2 SERPL-SCNC: 29 MMOL/L — SIGNIFICANT CHANGE UP (ref 22–31)
CREAT SERPL-MCNC: 0.82 MG/DL — SIGNIFICANT CHANGE UP (ref 0.5–1.3)
GLUCOSE SERPL-MCNC: 98 MG/DL — SIGNIFICANT CHANGE UP (ref 70–99)
HCT VFR BLD CALC: 36.8 % — SIGNIFICANT CHANGE UP (ref 34.5–45)
HGB BLD-MCNC: 12 G/DL — SIGNIFICANT CHANGE UP (ref 11.5–15.5)
MCHC RBC-ENTMCNC: 29.8 PG — SIGNIFICANT CHANGE UP (ref 27–34)
MCHC RBC-ENTMCNC: 32.6 GM/DL — SIGNIFICANT CHANGE UP (ref 32–36)
MCV RBC AUTO: 91.3 FL — SIGNIFICANT CHANGE UP (ref 80–100)
PLATELET # BLD AUTO: 180 K/UL — SIGNIFICANT CHANGE UP (ref 150–400)
POTASSIUM SERPL-MCNC: 4 MMOL/L — SIGNIFICANT CHANGE UP (ref 3.5–5.3)
POTASSIUM SERPL-SCNC: 4 MMOL/L — SIGNIFICANT CHANGE UP (ref 3.5–5.3)
RBC # BLD: 4.03 M/UL — SIGNIFICANT CHANGE UP (ref 3.8–5.2)
RBC # FLD: 11.9 % — SIGNIFICANT CHANGE UP (ref 10.3–14.5)
SODIUM SERPL-SCNC: 140 MMOL/L — SIGNIFICANT CHANGE UP (ref 135–145)
WBC # BLD: 7.97 K/UL — SIGNIFICANT CHANGE UP (ref 3.8–10.5)
WBC # FLD AUTO: 7.97 K/UL — SIGNIFICANT CHANGE UP (ref 3.8–10.5)

## 2021-03-17 PROCEDURE — 99232 SBSQ HOSP IP/OBS MODERATE 35: CPT

## 2021-03-17 PROCEDURE — 99231 SBSQ HOSP IP/OBS SF/LOW 25: CPT

## 2021-03-17 PROCEDURE — 99024 POSTOP FOLLOW-UP VISIT: CPT

## 2021-03-17 RX ORDER — HYDROMORPHONE HYDROCHLORIDE 2 MG/ML
4 INJECTION INTRAMUSCULAR; INTRAVENOUS; SUBCUTANEOUS EVERY 4 HOURS
Refills: 0 | Status: DISCONTINUED | OUTPATIENT
Start: 2021-03-17 | End: 2021-03-18

## 2021-03-17 RX ORDER — HYDROMORPHONE HYDROCHLORIDE 2 MG/ML
2 INJECTION INTRAMUSCULAR; INTRAVENOUS; SUBCUTANEOUS EVERY 4 HOURS
Refills: 0 | Status: DISCONTINUED | OUTPATIENT
Start: 2021-03-17 | End: 2021-03-18

## 2021-03-17 RX ORDER — HYDROMORPHONE HYDROCHLORIDE 2 MG/ML
1 INJECTION INTRAMUSCULAR; INTRAVENOUS; SUBCUTANEOUS
Qty: 42 | Refills: 0
Start: 2021-03-17 | End: 2021-03-23

## 2021-03-17 RX ORDER — HYDROMORPHONE HYDROCHLORIDE 2 MG/ML
0.5 INJECTION INTRAMUSCULAR; INTRAVENOUS; SUBCUTANEOUS EVERY 4 HOURS
Refills: 0 | Status: DISCONTINUED | OUTPATIENT
Start: 2021-03-17 | End: 2021-03-17

## 2021-03-17 RX ADMIN — SCOPALAMINE 1 PATCH: 1 PATCH, EXTENDED RELEASE TRANSDERMAL at 19:00

## 2021-03-17 RX ADMIN — SENNA PLUS 2 TABLET(S): 8.6 TABLET ORAL at 21:27

## 2021-03-17 RX ADMIN — Medication 975 MILLIGRAM(S): at 13:51

## 2021-03-17 RX ADMIN — SCOPALAMINE 1 PATCH: 1 PATCH, EXTENDED RELEASE TRANSDERMAL at 07:00

## 2021-03-17 RX ADMIN — OXYCODONE HYDROCHLORIDE 10 MILLIGRAM(S): 5 TABLET ORAL at 09:45

## 2021-03-17 RX ADMIN — Medication 975 MILLIGRAM(S): at 04:41

## 2021-03-17 RX ADMIN — OXYCODONE HYDROCHLORIDE 10 MILLIGRAM(S): 5 TABLET ORAL at 05:11

## 2021-03-17 RX ADMIN — Medication 325 MILLIGRAM(S): at 09:01

## 2021-03-17 RX ADMIN — HYDROMORPHONE HYDROCHLORIDE 0.5 MILLIGRAM(S): 2 INJECTION INTRAMUSCULAR; INTRAVENOUS; SUBCUTANEOUS at 06:39

## 2021-03-17 RX ADMIN — CITALOPRAM 40 MILLIGRAM(S): 10 TABLET, FILM COATED ORAL at 09:01

## 2021-03-17 RX ADMIN — CELECOXIB 200 MILLIGRAM(S): 200 CAPSULE ORAL at 09:00

## 2021-03-17 RX ADMIN — Medication 1 TABLET(S): at 21:28

## 2021-03-17 RX ADMIN — HYDROMORPHONE HYDROCHLORIDE 4 MILLIGRAM(S): 2 INJECTION INTRAMUSCULAR; INTRAVENOUS; SUBCUTANEOUS at 16:01

## 2021-03-17 RX ADMIN — POLYETHYLENE GLYCOL 3350 17 GRAM(S): 17 POWDER, FOR SOLUTION ORAL at 21:27

## 2021-03-17 RX ADMIN — HYDROMORPHONE HYDROCHLORIDE 0.5 MILLIGRAM(S): 2 INJECTION INTRAMUSCULAR; INTRAVENOUS; SUBCUTANEOUS at 06:09

## 2021-03-17 RX ADMIN — Medication 1 TABLET(S): at 13:51

## 2021-03-17 RX ADMIN — HYDROMORPHONE HYDROCHLORIDE 4 MILLIGRAM(S): 2 INJECTION INTRAMUSCULAR; INTRAVENOUS; SUBCUTANEOUS at 16:45

## 2021-03-17 RX ADMIN — Medication 975 MILLIGRAM(S): at 04:46

## 2021-03-17 RX ADMIN — Medication 975 MILLIGRAM(S): at 13:52

## 2021-03-17 RX ADMIN — Medication 500 MILLIGRAM(S): at 21:27

## 2021-03-17 RX ADMIN — Medication 100 MILLIGRAM(S): at 04:42

## 2021-03-17 RX ADMIN — OXYCODONE HYDROCHLORIDE 10 MILLIGRAM(S): 5 TABLET ORAL at 09:02

## 2021-03-17 RX ADMIN — HYDROMORPHONE HYDROCHLORIDE 4 MILLIGRAM(S): 2 INJECTION INTRAMUSCULAR; INTRAVENOUS; SUBCUTANEOUS at 11:31

## 2021-03-17 RX ADMIN — OXYCODONE HYDROCHLORIDE 10 MILLIGRAM(S): 5 TABLET ORAL at 04:41

## 2021-03-17 RX ADMIN — Medication 975 MILLIGRAM(S): at 21:26

## 2021-03-17 RX ADMIN — CELECOXIB 200 MILLIGRAM(S): 200 CAPSULE ORAL at 09:05

## 2021-03-17 RX ADMIN — Medication 500 MILLIGRAM(S): at 09:01

## 2021-03-17 RX ADMIN — Medication 1 TABLET(S): at 09:01

## 2021-03-17 RX ADMIN — Medication 145 MILLIGRAM(S): at 09:01

## 2021-03-17 RX ADMIN — CELECOXIB 200 MILLIGRAM(S): 200 CAPSULE ORAL at 21:28

## 2021-03-17 RX ADMIN — Medication 1 MILLIGRAM(S): at 09:01

## 2021-03-17 RX ADMIN — ENOXAPARIN SODIUM 40 MILLIGRAM(S): 100 INJECTION SUBCUTANEOUS at 09:02

## 2021-03-17 RX ADMIN — HYDROMORPHONE HYDROCHLORIDE 4 MILLIGRAM(S): 2 INJECTION INTRAMUSCULAR; INTRAVENOUS; SUBCUTANEOUS at 12:15

## 2021-03-17 RX ADMIN — Medication 2000 UNIT(S): at 09:01

## 2021-03-17 RX ADMIN — PANTOPRAZOLE SODIUM 40 MILLIGRAM(S): 20 TABLET, DELAYED RELEASE ORAL at 09:01

## 2021-03-17 RX ADMIN — HYDROMORPHONE HYDROCHLORIDE 4 MILLIGRAM(S): 2 INJECTION INTRAMUSCULAR; INTRAVENOUS; SUBCUTANEOUS at 21:58

## 2021-03-17 RX ADMIN — Medication 1 TABLET(S): at 04:41

## 2021-03-17 RX ADMIN — HYDROMORPHONE HYDROCHLORIDE 4 MILLIGRAM(S): 2 INJECTION INTRAMUSCULAR; INTRAVENOUS; SUBCUTANEOUS at 21:28

## 2021-03-17 NOTE — PROGRESS NOTE ADULT - ATTENDING COMMENTS
Patient is seen and examined at bedside with NP Kimberly Olvera. In a lot of pain- meds adjusted and switched to PO Dilaudid. Agree with above assessment and plan. Likely home tomorrow if pain is controlled. D/w pt and ortho team

## 2021-03-17 NOTE — PROGRESS NOTE ADULT - SUBJECTIVE AND OBJECTIVE BOX
Orthopedics Progress Note:    This is a 65yy/o Female s/p Right Total Shoulder Arthroplasty POD 1.  Pt reports pain is not well controlled, concerned oxycodone is not working. Pt just got Oxycodone 10mg.   Wiggling fingers well. Voiding spontaneously. No n/v.    Vital Signs Last 24 Hrs  T(C): 36.9 (03-17-21 @ 08:25), Max: 36.9 (03-17-21 @ 08:25)  T(F): 98.5 (03-17-21 @ 08:25), Max: 98.5 (03-17-21 @ 08:25)  HR: 76 (03-17-21 @ 08:25) (66 - 99)  BP: 112/65 (03-17-21 @ 08:25) (101/63 - 126/75)  BP(mean): 83 (03-16-21 @ 14:00) (80 - 86)  RR: 16 (03-17-21 @ 08:25) (14 - 21)  SpO2: 97% (03-17-21 @ 08:25) (94% - 100%)                        12.0   7.97  )-----------( 180      ( 17 Mar 2021 07:25 )             36.8     17 Mar 2021 07:25    140    |  108    |  17     ----------------------------<  98     4.0     |  29     |  0.82     Ca    9.1        17 Mar 2021 07:25          Exam:  NAD AAOx3.  Dressing clean, dry and intact.  Shoulder Immobilizer in place.  Moving wrist and all fingers, +AIN/PIN/Radial nerve/Ulnar nerve/Median nerve.  SILT throughout.  Radial Pulse 2+.    A/P:  Stable POD 1 Right Total Shoulder Arthroplasty  -Analgesia; will reevaluate shortly to assess efficacy of Oxycodone. Will consider PO Dilaudid if Oxycodone ineffective.  -Ice application  -Prophylactic antibiotics  -DVT PE prophylaxis  -Incentive spirometry  -OOB ambulate  -NWB RUE with shoulder immobilizer.  -PT with shoulder ROM restricted to: Internal rotation to chest wall, External rotation to neutral, Full forward flexion, No extension.  -Discharge planning home today.

## 2021-03-17 NOTE — PROGRESS NOTE ADULT - SUBJECTIVE AND OBJECTIVE BOX
HPI:  Patient is a 65y old  Female who presents with a chief complaint of elective right total shoulder arthroplasty (16 Mar 2021 14:09)    Consulted by Dr. Cutler   for VTE prophylaxis, risk stratification, and anticoagulation management.    PAST MEDICAL & SURGICAL HISTORY:  Anxiety and depression    Osteopenia    Osteoarthritis of right shoulder    Osteoarthritis of both knees  bilateral replacement    Incomplete bladder emptying    Urinary incontinence    Sleep apnea, obstructive  does not use machine    RBBB  history of  &quot;for 30 years&quot;    Right ovarian cyst    S/P knee replacement  left and right    S/P UPPP (uvulopalatopharyngoplasty)   with tonsillectomy        Interval History:  3/16: Patient seen at bedside on [facility] still reporting numbness in RUE. Has had issues with nausea in past from anesthesia. Asked for scopolamine patch which she reports "Is working very well." Advised that Lovenox indicated for 7-10 days post-op. 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   3-  Pt seen at bedside on [facility]. Discussed her anticoagulation with Lovenox  Pt states she can self inj, she is a former RN.   Benefits and risks discussed.      BMI: 30.4    CrCl:84.2    Incision Time:1142  Procedure End Time:1307  EBL: awaiting op note    Caprini VTE Risk Score:  CAPRINI SCORE  AGE RELATED RISK FACTORS                                                       MOBILITY RELATED FACTORS  [ ] Age 41-60 years                                            (1 Point)                  [ ] Bed rest                                                        (1 Point)  [x ] Age: 61-74 years                                           (2 Points)                [ ] Plaster cast                                                   (2 Points)  [ ] Age= 75 years                                              (3 Points)                 [ ] Bed bound for more than 72 hours                   (2 Points)    DISEASE RELATED RISK FACTORS                                               GENDER SPECIFIC FACTORS  [ ] Edema in the lower extremities                       (1 Point)           [ ] Pregnancy                                                            (1 Point)  [ ] Varicose veins                                               (1 Point)                  [ ] Post-partum < 6 weeks                                      (1 Point)             [x ] BMI > 25 Kg/m2                                            (1 Point)                  [ ] Hormonal therapy or oral contraception       (1 Point)                 [ ] Sepsis (in the previous month)                        (1 Point)             [ ] History of pregnancy complications                (1Point)  [ ] Pneumonia or serious lung disease                                             [ ] Unexplained or recurrent  (=/>3), premature                                 (In the previous month)                               (1 Point)                birth with toxemia or growth-restricted infant (1 Point)  [ ] Abnormal pulmonary function test            (1 Point)                                   SURGERY RELATED RISK FACTORS  [ ] Acute myocardial infarction                       (1 Point)                  [ ]  Section                                         (1 Point)  [ ] Congestive heart failure (in the previous month) (1 Point)   [ ] Minor surgery   lasting <45 minutes       (1 Point)   [ ] Inflammatory bowel disease                             (1 Point)          [ ] Arthroscopic surgery                                  (2 Points)  [ ] Central venous access                                    (2 Points)            [ x] General surgery lasting >45 minutes      (2 Points)       [ ] Stroke (in the previous month)                  (5 Points)            [ ] Elective major lower extremity arthroplasty (5 Points)                                   [  ] Malignancy (present or past include skin melanoma                                          but exclude  basal skin cell)    (2 points)                                      TRAUMA RELATED RISK FACTORS                HEMATOLOGY RELATED FACTORS                                  [ ] Fracture of the hip, pelvis, or leg                       (5 Points)  [ ] Prior episodes of VTE                                     (3 Points)          [ ] Acute spinal cord injury (in the previous month)  (5 Points)  [ ] Positive family history for VTE                         (3 Points)       [ ] Paralysis (less than 1 month)                          (5 Points)  [ ] Prothrombin 19275 A                                      (3 Points)         [ ] Multiple Trauma (within 1month)                 (5Points)                                                                                                                                                                [ ] Factor V Leiden                                          (3 Points)                                OTHER RISK FACTORS                          [ ] Lupus anticoagulants                                     (3 Points)                       [ ] BMI > 40                          (1 Point)                                                         [ ] Anticardiolipin antibodies                                (3 Points)                   [ ] Smoking                              (1Point)                                                [ ] High homocysteine in the blood                      (3 Points)                [  ] Diabetes requiring insulin (1point)                         [ ] Other congenital or acquired thrombophilia       (3 Points)          [  ] Chemotherapy                   (1 Point)  [ ] Heparin induced thrombocytopenia                  (3 Points)             [  ] Blood Transfusion                (1 point)                                                                                                             Total Score [       5   ]                                                                                                                                                                                                                                                                                                                                                                                                                                    IMPROVE Bleeding Risk Score:1.5      Falls Risk:   High (  )  Mod (x )  Low (  )      FAMILY HISTORY:  Family history of sepsis  mother      Denies any personal or familial history of clotting or bleeding disorders.    Allergies    No Known Allergies    Intolerances        REVIEW OF SYSTEMS    (  )Fever	        (  )Constipation	(  )SOB				  (  )Headache   (  )Dysuria  (  )Chills	        (  )Melena	        (  )Dyspnea on exertion (  )Dizziness    (  )Polyuria  (  )Nausea      (  )Hematochezia	(  )Cough                          (  )Syncope      (  )Hematuria  (  )Vomiting   (  )Chest Pain	        (  )Wheezing			  (  )Weakness  (  )Diarrhea    (  )Palpitations	(  )Anorexia			  ( x )Myalgia       ( x  ) Arthralgia    Pertinent positives in HPI and daily subjective. All other systems negative.    Vital Signs Last 24 Hrs  T(C): 36.8 (16 Mar 2021 14:35), Max: 36.8 (16 Mar 2021 14:35)  T(F): 98.2 (16 Mar 2021 14:35), Max: 98.2 (16 Mar 2021 14:35)  HR: 80 (16 Mar 2021 14:35) (66 - 80)  BP: 119/76 (16 Mar 2021 14:35) (113/71 - 121/67)  BP(mean): 83 (16 Mar 2021 14:00) (80 - 86)  RR: 18 (16 Mar 2021 14:35) (14 - 21)  SpO2: 98% (16 Mar 2021 14:35) (98% - 100%)      PHYSICAL EXAM:    Constitutional: Appears Well    Neurological: A& O x 3    Skin: Warm    Respiratory and Thorax: normal effort; Breath sounds: normal; No rales/wheezing/rhonchi  	  Cardiovascular: S1, S2, regular, NMBR	    Gastrointestinal: BS + x 4Q, nontender	    Musculoskeletal:   General Right:   no muscle/joint tenderness,   normal tone, no joint swelling,   ROM: limited	    General Left:   no muscle/joint tenderness,   normal tone, no joint swelling,   ROM: full    Shoulder:  Rt: Drsing CDI; Sling/immob. noted; Cap refill good; Radial pulse +; Sensation intact                Lower extrems:   Right: no calf tenderness              negative kip's sign               + pedal pulses    Left:   no calf tenderness              negative kip's sign               + pedal pulses                          13.2   6.93  )-----------( 189      ( 16 Mar 2021 13:49 )             40.7       03-16    142  |  109<H>  |  16  ----------------------------<  107<H>  4.2   |  29  |  0.87    Ca    8.9      16 Mar 2021 13:49        				    MEDICATIONS  (STANDING):  acetaminophen   Tablet .. 975 milliGRAM(s) Oral every 8 hours  ascorbic acid 500 milliGRAM(s) Oral two times a day  calcium carbonate 1250 mG  + Vitamin D (OsCal 500 + D) 1 Tablet(s) Oral three times a day  ceFAZolin   IVPB 2000 milliGRAM(s) IV Intermittent every 8 hours  cholecalciferol 2000 Unit(s) Oral daily  citalopram 40 milliGRAM(s) Oral daily  enoxaparin Injectable 40 milliGRAM(s) SubCutaneous daily  fenofibrate Tablet 145 milliGRAM(s) Oral daily  ferrous    sulfate 325 milliGRAM(s) Oral daily  folic acid 1 milliGRAM(s) Oral daily  lactated ringers. 1000 milliLiter(s) IV Continuous <Continuous>  multivitamin 1 Tablet(s) Oral daily  pantoprazole    Tablet 40 milliGRAM(s) Oral daily  polyethylene glycol 3350 17 Gram(s) Oral at bedtime  senna 2 Tablet(s) Oral at bedtime      **Current DVT Prophylaxis:    LMWH                   ( x )  Heparin SQ           (  )  Coumadin             (  )  Xarelto                  (  )  Eliquis                   (  )  Venodynes           (x  )  Ambulation          (x  )  UFH                       (  )  ECASA                   (  )  Contraindicated  (  )

## 2021-03-17 NOTE — PROGRESS NOTE ADULT - SUBJECTIVE AND OBJECTIVE BOX
PCP:  Derek Blankenship  Ortho:  [facility]    CHIEF COMPLAINT:   right shoulder pain    HISTORY OF THE PRESENT ILLNESS:  65 F with Hx of osteoarthritis of the right shoulder, has been experiencing progressive right arm and shoulder pain for the past 3-4 years.  She has tried conservative therapies to manage the pain including PT and injections but they have not overall helped.  She experiences pain at rest and with movement and motion of the right arm/shoulder.  She cannot lift her right arm beyond 90 degrees.  Her pain has limited her ability to complete her activities of daily living.  The patient was seen by her orthopedist and a decision was made to undergo right total shoulder replacement surgery.   The hospitalist service has been consulted to assist with post-op medical management.      3/17; seen at bedside, having significant post-op pain in her right shoulder, pain meds adjusted,  denies any CP or SOB      REVIEW OF SYSTEMS:   All 10 systems reviewed in detailed and found to be negative with the exception of what has already been described above        MEDICATIONS  (STANDING):  acetaminophen   Tablet .. 975 milliGRAM(s) Oral every 8 hours  ascorbic acid 500 milliGRAM(s) Oral two times a day  calcium carbonate 1250 mG  + Vitamin D (OsCal 500 + D) 1 Tablet(s) Oral three times a day  celecoxib 200 milliGRAM(s) Oral every 12 hours  cholecalciferol 2000 Unit(s) Oral daily  citalopram 40 milliGRAM(s) Oral daily  enoxaparin Injectable 40 milliGRAM(s) SubCutaneous daily  fenofibrate Tablet 145 milliGRAM(s) Oral daily  ferrous    sulfate 325 milliGRAM(s) Oral daily  folic acid 1 milliGRAM(s) Oral daily  lactated ringers. 1000 milliLiter(s) (75 mL/Hr) IV Continuous <Continuous>  multivitamin 1 Tablet(s) Oral daily  pantoprazole    Tablet 40 milliGRAM(s) Oral daily  polyethylene glycol 3350 17 Gram(s) Oral at bedtime  senna 2 Tablet(s) Oral at bedtime    MEDICATIONS  (PRN):  HYDROmorphone   Tablet 2 milliGRAM(s) Oral every 4 hours PRN Moderate Pain (4 - 6)  HYDROmorphone   Tablet 4 milliGRAM(s) Oral every 4 hours PRN Severe Pain (7 - 10)  ondansetron Injectable 8 milliGRAM(s) IV Push every 8 hours PRN Nausea and/or Vomiting    VITALS:  Vital Signs Last 24 Hrs  T(C): 36.9 (03-17-21 @ 08:25), Max: 36.9 (03-17-21 @ 08:25)  T(F): 98.5 (03-17-21 @ 08:25), Max: 98.5 (03-17-21 @ 08:25)  HR: 76 (03-17-21 @ 08:25) (68 - 99)  BP: 112/65 (03-17-21 @ 08:25) (101/63 - 126/75)  BP(mean): 83 (03-16-21 @ 14:00) (80 - 83)  RR: 16 (03-17-21 @ 08:25) (14 - 18)  SpO2: 97% (03-17-21 @ 08:25) (94% - 100%)    PHYSICAL EXAM:    GENERAL: Comfortable, no acute distress   HEAD:  Normocephalic, atraumatic  EYES: EOMI, PERRLA  HEENT: Moist mucous membranes  NECK: Supple, No JVD  NERVOUS SYSTEM:  Alert & Oriented X3, Motor Strength 5/5 B/L upper and lower extremities  CHEST/LUNG: Clear to auscultation bilaterally  HEART: Regular rate and rhythm  ABDOMEN: Soft, non tender, Nondistended, Bowel sounds present  GENITOURINARY: Voiding, no palpable bladder  EXTREMITIES:   No clubbing, cyanosis, or edema  MUSCULOSKELETAL- + right shoulder dsg c/d/I  SKIN-no rash        LABS:                              12.0   7.97  )-----------( 180      ( 17 Mar 2021 07:25 )             36.8       03-17    140  |  108  |  17  ----------------------------<  98  4.0   |  29  |  0.82    Ca    9.1      17 Mar 2021 07:25          IMPRESSION:    S/P ELECTIVE RIGHT TOTAL SHOULDER REPLACEMENT FOR SEVERE OSTEOARTHRITIS, POD #0  pain control  -  incentive spirometry  -  neurovascular checks  -  wound care per ortho  -  RUE in sling per ortho  -  complete perioperative ABXs  -  advance diet as tolerated  _ daily cbc/bmp  - PT eval    ANXIETY / DEPRESSION  cont citalopram    HYPERLIPIDEMIA  cont fenofibrate    VTE prophylaxis  -  anticoagulation consult: appreciated    [facility]   INC amb      -  * discussed with patient COVID vaccine but she already received Moderna, both doses, last dose 3/6/21    dispo: home tomorrow

## 2021-03-17 NOTE — PROGRESS NOTE ADULT - SUBJECTIVE AND OBJECTIVE BOX
POD 1 Total Shoulder Arthroplasty  No acute events overnight.    Vital Signs Last 24 Hrs  T(C): 36.5 (17 Mar 2021 05:03), Max: 36.8 (16 Mar 2021 14:35)  T(F): 97.7 (17 Mar 2021 05:03), Max: 98.2 (16 Mar 2021 14:35)  HR: 83 (17 Mar 2021 05:03) (66 - 99)  BP: 112/69 (17 Mar 2021 05:03) (101/63 - 126/75)  BP(mean): 83 (16 Mar 2021 14:00) (80 - 86)  RR: 18 (17 Mar 2021 05:03) (14 - 21)  SpO2: 99% (17 Mar 2021 05:03) (94% - 100%)      NAD AAOx3  Dressing Clean and dry  Shoulder Immobilizer in place  AROM: Intact AIN PIN, wrist ext/fex.  SILT  2+ Radial Pulse        A/P:  Stable POD 1 RIGHT Total Shoulder Arthroplasty  -Analgesia  -ppx ABX  -DVT PE ppx  -OOB ambulate  -ROM to shoulder restricted to: Internal to chest wall, External to neutral, Full forward elevation, No extension.  -Discharge home

## 2021-03-18 ENCOUNTER — TRANSCRIPTION ENCOUNTER (OUTPATIENT)
Age: 66
End: 2021-03-18

## 2021-03-18 VITALS
OXYGEN SATURATION: 97 % | SYSTOLIC BLOOD PRESSURE: 109 MMHG | TEMPERATURE: 98 F | HEART RATE: 65 BPM | RESPIRATION RATE: 16 BRPM | DIASTOLIC BLOOD PRESSURE: 67 MMHG

## 2021-03-18 LAB
ANION GAP SERPL CALC-SCNC: 2 MMOL/L — LOW (ref 5–17)
BUN SERPL-MCNC: 16 MG/DL — SIGNIFICANT CHANGE UP (ref 7–23)
CALCIUM SERPL-MCNC: 9.4 MG/DL — SIGNIFICANT CHANGE UP (ref 8.5–10.1)
CHLORIDE SERPL-SCNC: 109 MMOL/L — HIGH (ref 96–108)
CO2 SERPL-SCNC: 32 MMOL/L — HIGH (ref 22–31)
CREAT SERPL-MCNC: 0.77 MG/DL — SIGNIFICANT CHANGE UP (ref 0.5–1.3)
GLUCOSE SERPL-MCNC: 98 MG/DL — SIGNIFICANT CHANGE UP (ref 70–99)
HCT VFR BLD CALC: 36.3 % — SIGNIFICANT CHANGE UP (ref 34.5–45)
HGB BLD-MCNC: 11.6 G/DL — SIGNIFICANT CHANGE UP (ref 11.5–15.5)
MCHC RBC-ENTMCNC: 30 PG — SIGNIFICANT CHANGE UP (ref 27–34)
MCHC RBC-ENTMCNC: 32 GM/DL — SIGNIFICANT CHANGE UP (ref 32–36)
MCV RBC AUTO: 93.8 FL — SIGNIFICANT CHANGE UP (ref 80–100)
PLATELET # BLD AUTO: 154 K/UL — SIGNIFICANT CHANGE UP (ref 150–400)
POTASSIUM SERPL-MCNC: 4.3 MMOL/L — SIGNIFICANT CHANGE UP (ref 3.5–5.3)
POTASSIUM SERPL-SCNC: 4.3 MMOL/L — SIGNIFICANT CHANGE UP (ref 3.5–5.3)
RBC # BLD: 3.87 M/UL — SIGNIFICANT CHANGE UP (ref 3.8–5.2)
RBC # FLD: 12.2 % — SIGNIFICANT CHANGE UP (ref 10.3–14.5)
SODIUM SERPL-SCNC: 143 MMOL/L — SIGNIFICANT CHANGE UP (ref 135–145)
WBC # BLD: 6.96 K/UL — SIGNIFICANT CHANGE UP (ref 3.8–10.5)
WBC # FLD AUTO: 6.96 K/UL — SIGNIFICANT CHANGE UP (ref 3.8–10.5)

## 2021-03-18 PROCEDURE — 99231 SBSQ HOSP IP/OBS SF/LOW 25: CPT

## 2021-03-18 PROCEDURE — 99232 SBSQ HOSP IP/OBS MODERATE 35: CPT

## 2021-03-18 RX ADMIN — Medication 2000 UNIT(S): at 09:46

## 2021-03-18 RX ADMIN — CELECOXIB 200 MILLIGRAM(S): 200 CAPSULE ORAL at 09:45

## 2021-03-18 RX ADMIN — HYDROMORPHONE HYDROCHLORIDE 4 MILLIGRAM(S): 2 INJECTION INTRAMUSCULAR; INTRAVENOUS; SUBCUTANEOUS at 09:54

## 2021-03-18 RX ADMIN — Medication 975 MILLIGRAM(S): at 05:41

## 2021-03-18 RX ADMIN — HYDROMORPHONE HYDROCHLORIDE 4 MILLIGRAM(S): 2 INJECTION INTRAMUSCULAR; INTRAVENOUS; SUBCUTANEOUS at 03:47

## 2021-03-18 RX ADMIN — Medication 975 MILLIGRAM(S): at 05:40

## 2021-03-18 RX ADMIN — CITALOPRAM 40 MILLIGRAM(S): 10 TABLET, FILM COATED ORAL at 09:50

## 2021-03-18 RX ADMIN — Medication 500 MILLIGRAM(S): at 09:45

## 2021-03-18 RX ADMIN — HYDROMORPHONE HYDROCHLORIDE 4 MILLIGRAM(S): 2 INJECTION INTRAMUSCULAR; INTRAVENOUS; SUBCUTANEOUS at 04:17

## 2021-03-18 RX ADMIN — HYDROMORPHONE HYDROCHLORIDE 4 MILLIGRAM(S): 2 INJECTION INTRAMUSCULAR; INTRAVENOUS; SUBCUTANEOUS at 10:55

## 2021-03-18 RX ADMIN — Medication 1 TABLET(S): at 05:40

## 2021-03-18 RX ADMIN — ENOXAPARIN SODIUM 40 MILLIGRAM(S): 100 INJECTION SUBCUTANEOUS at 09:47

## 2021-03-18 RX ADMIN — Medication 1 MILLIGRAM(S): at 09:47

## 2021-03-18 RX ADMIN — SCOPALAMINE 1 PATCH: 1 PATCH, EXTENDED RELEASE TRANSDERMAL at 07:35

## 2021-03-18 RX ADMIN — PANTOPRAZOLE SODIUM 40 MILLIGRAM(S): 20 TABLET, DELAYED RELEASE ORAL at 09:47

## 2021-03-18 RX ADMIN — Medication 1 TABLET(S): at 09:47

## 2021-03-18 RX ADMIN — Medication 145 MILLIGRAM(S): at 09:47

## 2021-03-18 NOTE — PROGRESS NOTE ADULT - SUBJECTIVE AND OBJECTIVE BOX
PCP:  Derek Blankenship  Ortho:  [facility]    CHIEF COMPLAINT:   right shoulder pain    HISTORY OF THE PRESENT ILLNESS:  65 F with Hx of osteoarthritis of the right shoulder, has been experiencing progressive right arm and shoulder pain for the past 3-4 years.  She has tried conservative therapies to manage the pain including PT and injections but they have not overall helped.  She experiences pain at rest and with movement and motion of the right arm/shoulder.  She cannot lift her right arm beyond 90 degrees.  Her pain has limited her ability to complete her activities of daily living.  The patient was seen by her orthopedist and a decision was made to undergo right total shoulder replacement surgery.   The hospitalist service has been consulted to assist with post-op medical management.      3/18; seen up and about in room,  denies any CP or SOB, wants to go home      REVIEW OF SYSTEMS:   All 10 systems reviewed in detailed and found to be negative with the exception of what has already been described above    MEDICATIONS  (STANDING):  acetaminophen   Tablet .. 975 milliGRAM(s) Oral every 8 hours  ascorbic acid 500 milliGRAM(s) Oral two times a day  calcium carbonate 1250 mG  + Vitamin D (OsCal 500 + D) 1 Tablet(s) Oral three times a day  celecoxib 200 milliGRAM(s) Oral every 12 hours  cholecalciferol 2000 Unit(s) Oral daily  citalopram 40 milliGRAM(s) Oral daily  enoxaparin Injectable 40 milliGRAM(s) SubCutaneous daily  fenofibrate Tablet 145 milliGRAM(s) Oral daily  ferrous    sulfate 325 milliGRAM(s) Oral daily  folic acid 1 milliGRAM(s) Oral daily  lactated ringers. 1000 milliLiter(s) (75 mL/Hr) IV Continuous <Continuous>  multivitamin 1 Tablet(s) Oral daily  pantoprazole    Tablet 40 milliGRAM(s) Oral daily  polyethylene glycol 3350 17 Gram(s) Oral at bedtime  senna 2 Tablet(s) Oral at bedtime    MEDICATIONS  (PRN):  bisacodyl Suppository 10 milliGRAM(s) Rectal once PRN Constipation  HYDROmorphone   Tablet 2 milliGRAM(s) Oral every 4 hours PRN Moderate Pain (4 - 6)  HYDROmorphone   Tablet 4 milliGRAM(s) Oral every 4 hours PRN Severe Pain (7 - 10)  ondansetron Injectable 8 milliGRAM(s) IV Push every 8 hours PRN Nausea and/or Vomiting    VITALS:  Vital Signs Last 24 Hrs  T(C): 36.7 (03-18-21 @ 08:20), Max: 37.1 (03-17-21 @ 17:09)  T(F): 98 (03-18-21 @ 08:20), Max: 98.7 (03-17-21 @ 17:09)  HR: 65 (03-18-21 @ 08:20) (65 - 76)  BP: 109/67 (03-18-21 @ 08:20) (108/59 - 118/62)  BP(mean): --  RR: 16 (03-18-21 @ 08:20) (16 - 18)  SpO2: 97% (03-18-21 @ 08:20) (94% - 97%)      PHYSICAL EXAM:    GENERAL: Comfortable, no acute distress   HEAD:  Normocephalic, atraumatic  EYES: EOMI, PERRLA  HEENT: Moist mucous membranes  NECK: Supple, No JVD  NERVOUS SYSTEM:  Alert & Oriented X3, Motor Strength 5/5 B/L upper and lower extremities  CHEST/LUNG: Clear to auscultation bilaterally  HEART: Regular rate and rhythm  ABDOMEN: Soft, non tender, Nondistended, Bowel sounds present  GENITOURINARY: Voiding, no palpable bladder  EXTREMITIES:   No clubbing, cyanosis, or edema  MUSCULOSKELETAL- + right shoulder dsg c/d/I  SKIN-no rash        LABS:                                                 11.6   6.96  )-----------( 154      ( 18 Mar 2021 06:35 )             36.3       03-18    143  |  109<H>  |  16  ----------------------------<  98  4.3   |  32<H>  |  0.77    Ca    9.4      18 Mar 2021 06:35                IMPRESSION:    S/P ELECTIVE RIGHT TOTAL SHOULDER REPLACEMENT FOR SEVERE OSTEOARTHRITIS, POD #0  pain control  -  incentive spirometry  -  neurovascular checks  -  wound care per ortho  -  RUE in sling per ortho  -  complete perioperative ABXs  -  advance diet as tolerated  _ daily cbc/bmp  - PT eval    ANXIETY / DEPRESSION  cont citalopram    HYPERLIPIDEMIA  cont fenofibrate    VTE prophylaxis  -  anticoagulation consult: appreciated    lovenox to f/u with pt as out pt while on Lovenox  [employer]   [facility]      -  * discussed with patient COVID vaccine but she already received Moderna, both doses, last dose 3/6/21    dispo: home today   PCP:  Derek Blankenship  Ortho:  [facility]    CHIEF COMPLAINT:   right shoulder pain    HISTORY OF THE PRESENT ILLNESS:  65 F with Hx of osteoarthritis of the right shoulder, has been experiencing progressive right arm and shoulder pain for the past 3-4 years.  She has tried conservative therapies to manage the pain including PT and injections but they have not overall helped.  She experiences pain at rest and with movement and motion of the right arm/shoulder.  She cannot lift her right arm beyond 90 degrees.  Her pain has limited her ability to complete her activities of daily living.  The patient was seen by her orthopedist and a decision was made to undergo right total shoulder replacement surgery.   The hospitalist service has been consulted to assist with post-op medical management.      3/18; seen up and about in room,  denies any CP or SOB, wants to go home      REVIEW OF SYSTEMS:   All 10 systems reviewed in detailed and found to be negative with the exception of what has already been described above    MEDICATIONS  (STANDING):  acetaminophen   Tablet .. 975 milliGRAM(s) Oral every 8 hours  ascorbic acid 500 milliGRAM(s) Oral two times a day  calcium carbonate 1250 mG  + Vitamin D (OsCal 500 + D) 1 Tablet(s) Oral three times a day  celecoxib 200 milliGRAM(s) Oral every 12 hours  cholecalciferol 2000 Unit(s) Oral daily  citalopram 40 milliGRAM(s) Oral daily  enoxaparin Injectable 40 milliGRAM(s) SubCutaneous daily  fenofibrate Tablet 145 milliGRAM(s) Oral daily  ferrous    sulfate 325 milliGRAM(s) Oral daily  folic acid 1 milliGRAM(s) Oral daily  lactated ringers. 1000 milliLiter(s) (75 mL/Hr) IV Continuous <Continuous>  multivitamin 1 Tablet(s) Oral daily  pantoprazole    Tablet 40 milliGRAM(s) Oral daily  polyethylene glycol 3350 17 Gram(s) Oral at bedtime  senna 2 Tablet(s) Oral at bedtime    MEDICATIONS  (PRN):  bisacodyl Suppository 10 milliGRAM(s) Rectal once PRN Constipation  HYDROmorphone   Tablet 2 milliGRAM(s) Oral every 4 hours PRN Moderate Pain (4 - 6)  HYDROmorphone   Tablet 4 milliGRAM(s) Oral every 4 hours PRN Severe Pain (7 - 10)  ondansetron Injectable 8 milliGRAM(s) IV Push every 8 hours PRN Nausea and/or Vomiting    VITALS:  Vital Signs Last 24 Hrs  T(C): 36.7 (03-18-21 @ 08:20), Max: 37.1 (03-17-21 @ 17:09)  T(F): 98 (03-18-21 @ 08:20), Max: 98.7 (03-17-21 @ 17:09)  HR: 65 (03-18-21 @ 08:20) (65 - 76)  BP: 109/67 (03-18-21 @ 08:20) (108/59 - 118/62)  BP(mean): --  RR: 16 (03-18-21 @ 08:20) (16 - 18)  SpO2: 97% (03-18-21 @ 08:20) (94% - 97%)      PHYSICAL EXAM:    GENERAL: Comfortable, no acute distress   HEAD:  Normocephalic, atraumatic  EYES: EOMI, PERRLA  HEENT: Moist mucous membranes  NECK: Supple, No JVD  NERVOUS SYSTEM:  Alert & Oriented X3, Motor Strength 5/5 B/L upper and lower extremities  CHEST/LUNG: Clear to auscultation bilaterally  HEART: Regular rate and rhythm  ABDOMEN: Soft, non tender, Nondistended, Bowel sounds present  GENITOURINARY: Voiding, no palpable bladder  EXTREMITIES:   No clubbing, cyanosis, or edema  MUSCULOSKELETAL- + right shoulder dsg c/d/I  SKIN-no rash        LABS:                                                 11.6   6.96  )-----------( 154      ( 18 Mar 2021 06:35 )             36.3       03-18    143  |  109<H>  |  16  ----------------------------<  98  4.3   |  32<H>  |  0.77    Ca    9.4      18 Mar 2021 06:35                IMPRESSION:    S/P ELECTIVE RIGHT TOTAL SHOULDER REPLACEMENT FOR SEVERE OSTEOARTHRITIS  pain control  -  incentive spirometry  -  neurovascular checks  -  wound care per ortho  -  RUE in sling per ortho  -  complete perioperative ABXs  -  advance diet as tolerated  _ daily cbc/bmp  - PT eval    ANXIETY / DEPRESSION  cont citalopram    HYPERLIPIDEMIA  cont fenofibrate    VTE prophylaxis  -  anticoagulation consult: appreciated    lovenox to f/u with pt as out pt while on Lovenox  venodynes   INC amb      -  * discussed with patient COVID vaccine but she already received Moderna, both doses, last dose 3/6/21    dispo: home today

## 2021-03-18 NOTE — PROGRESS NOTE ADULT - PROVIDER SPECIALTY LIST ADULT
Orthopedics
Orthopedics
Hospitalist
Hospitalist
Orthopedics
Anticoag Management
Anticoag Management
Orthopedics
no

## 2021-03-18 NOTE — DISCHARGE NOTE NURSING/CASE MANAGEMENT/SOCIAL WORK - NSDCPETBCESMAN_GEN_ALL_CORE
If you are a smoker, it is important for your health to stop smoking. Please be aware that second hand smoke is also harmful.
5 feet

## 2021-03-18 NOTE — PROGRESS NOTE ADULT - ATTENDING COMMENTS
Patient is seen and examined at bedside with NP Kimberly Olvera. Her pain is much better controlled on PO Dilaudid. Going home today. Agree with above assessment and plan. D/w pt and ortho team

## 2021-03-18 NOTE — PROGRESS NOTE ADULT - SUBJECTIVE AND OBJECTIVE BOX
Orthopedics Progress Note:    This is a 65yy/o Female s/p Right Total Shoulder Arthroplasty POD 2.  Pt reports pain is more controlled.  Wiggling fingers well. Voiding spontaneously. No n/v.    Vital Signs Last 24 Hrs  T(C): 36.9 (18 Mar 2021 00:18), Max: 37.1 (17 Mar 2021 17:09)  T(F): 98.5 (18 Mar 2021 00:18), Max: 98.7 (17 Mar 2021 17:09)  HR: 75 (18 Mar 2021 00:18) (72 - 76)  BP: 108/59 (18 Mar 2021 00:18) (108/59 - 118/62)  BP(mean): --  RR: 17 (18 Mar 2021 00:18) (16 - 18)  SpO2: 94% (18 Mar 2021 00:18) (94% - 97%)        Exam:  NAD AAOx3.  Dressing clean, dry and intact.  Shoulder Immobilizer in place.  Moving wrist and all fingers, +AIN/PIN/Radial nerve/Ulnar nerve/Median nerve.  SILT throughout.  Radial Pulse 2+.    A/P:  Stable POD 2 Right Total Shoulder Arthroplasty  -Analgesia  -Ice application  -DVT PE prophylaxis per AC team  -Incentive spirometry  -OOB ambulate  -NWB RUE with shoulder immobilizer.  -PT with shoulder ROM restricted to: Internal rotation to chest wall, External rotation to neutral, Full forward flexion, No extension.  -Discharge planning home today.

## 2021-03-18 NOTE — PROGRESS NOTE ADULT - ASSESSMENT
This is a 65 year old female s/p right shoulder replacement with moderate risk for VTE due to age, BMI, and impaired mobility s/p surgery. She is low risk for bleeding.    Plan:  ::Lovenox 40 mg SQ daily x 7-10 days (script sent to pharmacy) cost for pt is $48.00   ::Daily CBC/BMP  ::Venodynes b/l  ::Amb per PT, as tolerated    Thank you for this consult, will continue to follow.  Dispo: Home  
This is a 65 year old female s/p right shoulder replacement with moderate risk for VTE due to age, BMI, and impaired mobility s/p surgery. She is low risk for bleeding.    Plan:  ::Lovenox 40 mg SQ daily x 7-10 days (script sent to pharmacy) cost for pt is $48.00   ::Daily CBC/BMP  ::Venodynes b/l  ::Amb per PT, as tolerated    Will continue to follow.  Dispo: Home 3/18/21

## 2021-03-18 NOTE — PROGRESS NOTE ADULT - SUBJECTIVE AND OBJECTIVE BOX
HPI:  Patient is a 65y old  Female who presents with a chief complaint of elective right total shoulder arthroplasty (16 Mar 2021 14:09)    Consulted by Dr. Cutler for VTE prophylaxis, risk stratification, and anticoagulation management.    PAST MEDICAL & SURGICAL HISTORY:  Anxiety and depression    Osteopenia    Osteoarthritis of right shoulder    Osteoarthritis of both knees  bilateral replacement    Incomplete bladder emptying    Urinary incontinence    Sleep apnea, obstructive  does not use machine    RBBB  history of  &quot;for 30 years&quot;    Right ovarian cyst    S/P knee replacement  left and right    S/P UPPP (uvulopalatopharyngoplasty)   with tonsillectomy        Interval History:  3/16: Patient seen at bedside on [facility] still reporting numbness in RUE. Has had issues with nausea in past from anesthesia. Asked for scopolamine patch which she reports "Is working very well." Advised that Lovenox indicated for 7-10 days post-op. 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   3-  Pt seen at bedside on [facility]. Discussed her anticoagulation with Lovenox  Pt states she can self inj, she is a former RN.   Benefits and risks discussed.    3/18/21: Patient seen at bedside smiling. Reporting her pain as "So much better today," going home today. She denies any issues with Lovenox, and advised that outpatient lab will contact to draw CBC/BMP. She verbalized understanding.    BMI: 30.4    CrCl:84.2    Incision Time:1142  Procedure End Time:1307  EBL: awaiting op note    Caprini VTE Risk Score:  CAPRINI SCORE  AGE RELATED RISK FACTORS                                                       MOBILITY RELATED FACTORS  [ ] Age 41-60 years                                            (1 Point)                  [ ] Bed rest                                                        (1 Point)  [x ] Age: 61-74 years                                           (2 Points)                [ ] Plaster cast                                                   (2 Points)  [ ] Age= 75 years                                              (3 Points)                 [ ] Bed bound for more than 72 hours                   (2 Points)    DISEASE RELATED RISK FACTORS                                               GENDER SPECIFIC FACTORS  [ ] Edema in the lower extremities                       (1 Point)           [ ] Pregnancy                                                            (1 Point)  [ ] Varicose veins                                               (1 Point)                  [ ] Post-partum < 6 weeks                                      (1 Point)             [x ] BMI > 25 Kg/m2                                            (1 Point)                  [ ] Hormonal therapy or oral contraception       (1 Point)                 [ ] Sepsis (in the previous month)                        (1 Point)             [ ] History of pregnancy complications                (1Point)  [ ] Pneumonia or serious lung disease                                             [ ] Unexplained or recurrent  (=/>3), premature                                 (In the previous month)                               (1 Point)                birth with toxemia or growth-restricted infant (1 Point)  [ ] Abnormal pulmonary function test            (1 Point)                                   SURGERY RELATED RISK FACTORS  [ ] Acute myocardial infarction                       (1 Point)                  [ ]  Section                                         (1 Point)  [ ] Congestive heart failure (in the previous month) (1 Point)   [ ] Minor surgery   lasting <45 minutes       (1 Point)   [ ] Inflammatory bowel disease                             (1 Point)          [ ] Arthroscopic surgery                                  (2 Points)  [ ] Central venous access                                    (2 Points)            [ x] General surgery lasting >45 minutes      (2 Points)       [ ] Stroke (in the previous month)                  (5 Points)            [ ] Elective major lower extremity arthroplasty (5 Points)                                   [  ] Malignancy (present or past include skin melanoma                                          but exclude  basal skin cell)    (2 points)                                      TRAUMA RELATED RISK FACTORS                HEMATOLOGY RELATED FACTORS                                  [ ] Fracture of the hip, pelvis, or leg                       (5 Points)  [ ] Prior episodes of VTE                                     (3 Points)          [ ] Acute spinal cord injury (in the previous month)  (5 Points)  [ ] Positive family history for VTE                         (3 Points)       [ ] Paralysis (less than 1 month)                          (5 Points)  [ ] Prothrombin 26307 A                                      (3 Points)         [ ] Multiple Trauma (within 1month)                 (5Points)                                                                                                                                                                [ ] Factor V Leiden                                          (3 Points)                                OTHER RISK FACTORS                          [ ] Lupus anticoagulants                                     (3 Points)                       [ ] BMI > 40                          (1 Point)                                                         [ ] Anticardiolipin antibodies                                (3 Points)                   [ ] Smoking                              (1Point)                                                [ ] High homocysteine in the blood                      (3 Points)                [  ] Diabetes requiring insulin (1point)                         [ ] Other congenital or acquired thrombophilia       (3 Points)          [  ] Chemotherapy                   (1 Point)  [ ] Heparin induced thrombocytopenia                  (3 Points)             [  ] Blood Transfusion                (1 point)                                                                                                             Total Score [       5   ]                                                                                                                                                                                                                                                                                                                                                                                                                                    IMPROVE Bleeding Risk Score:1.5      Falls Risk:   High (  )  Mod (x )  Low (  )      FAMILY HISTORY:  Family history of sepsis  mother      Denies any personal or familial history of clotting or bleeding disorders.    Allergies    No Known Allergies    Intolerances        REVIEW OF SYSTEMS    (  )Fever	        (  )Constipation	(  )SOB				  (  )Headache   (  )Dysuria  (  )Chills	        (  )Melena	        (  )Dyspnea on exertion (  )Dizziness    (  )Polyuria  (  )Nausea      (  )Hematochezia	(  )Cough                          (  )Syncope      (  )Hematuria  (  )Vomiting   (  )Chest Pain	        (  )Wheezing			  (  )Weakness  (  )Diarrhea    (  )Palpitations	(  )Anorexia			  ( x )Myalgia       ( x  ) Arthralgia    Pertinent positives in HPI and daily subjective. All other systems negative.    Vital Signs Last 24 Hrs  T(C): 36.7 (21 @ 08:20), Max: 37.1 (21 @ 17:09)  T(F): 98 (21 @ 08:20), Max: 98.7 (21 @ 17:09)  HR: 65 (21 @ 08:20) (65 - 76)  BP: 109/67 (21 @ 08:20) (108/59 - 118/62)  BP(mean): --  RR: 16 (21 @ 08:20) ( - 18)  SpO2: 97% (21 @ 08:20) (94% - 97%)      PHYSICAL EXAM:    Constitutional: Appears Well    Neurological: A& O x 3    Skin: Warm    Respiratory and Thorax: normal effort; Breath sounds: normal; No rales/wheezing/rhonchi  	  Cardiovascular: S1, S2, regular, NMBR	    Gastrointestinal: BS + x 4Q, nontender	    Musculoskeletal:   General Right:   no muscle/joint tenderness,   normal tone, no joint swelling,   ROM: limited	    General Left:   no muscle/joint tenderness,   normal tone, no joint swelling,   ROM: full    Shoulder:  Rt: Drsing CDI; Sling noted; Cap refill good; Radial pulse +; Sensation intact                Lower extrems:   Right: no calf tenderness              negative kip's sign               + pedal pulses    Left:   no calf tenderness              negative kip's sign               + pedal pulses                          11.6   6.96  )-----------( 154      ( 18 Mar 2021 06:35 )             36.3       03-18    143  |  109<H>  |  16  ----------------------------<  98  4.3   |  32<H>  |  0.77    Ca    9.4      18 Mar 2021 06:35                                13.2   6.93  )-----------( 189      ( 16 Mar 2021 13:49 )             40.7       03-16    142  |  109<H>  |  16  ----------------------------<  107<H>  4.2   |  29  |  0.87    Ca    8.9      16 Mar 2021 13:49        				    MEDICATIONS  (STANDING):  acetaminophen   Tablet .. 975 milliGRAM(s) Oral every 8 hours  ascorbic acid 500 milliGRAM(s) Oral two times a day  calcium carbonate 1250 mG  + Vitamin D (OsCal 500 + D) 1 Tablet(s) Oral three times a day  ceFAZolin   IVPB 2000 milliGRAM(s) IV Intermittent every 8 hours  cholecalciferol 2000 Unit(s) Oral daily  citalopram 40 milliGRAM(s) Oral daily  enoxaparin Injectable 40 milliGRAM(s) SubCutaneous daily  fenofibrate Tablet 145 milliGRAM(s) Oral daily  ferrous    sulfate 325 milliGRAM(s) Oral daily  folic acid 1 milliGRAM(s) Oral daily  lactated ringers. 1000 milliLiter(s) IV Continuous <Continuous>  multivitamin 1 Tablet(s) Oral daily  pantoprazole    Tablet 40 milliGRAM(s) Oral daily  polyethylene glycol 3350 17 Gram(s) Oral at bedtime  senna 2 Tablet(s) Oral at bedtime      **Current DVT Prophylaxis:    LMWH                   ( x )  Heparin SQ           (  )  Coumadin             (  )  Xarelto                  (  )  Eliquis                   (  )  Venodynes           (x  )  Ambulation          (x  )  UFH                       (  )  ECASA                   (  )  Contraindicated  (  )

## 2021-03-18 NOTE — PROGRESS NOTE ADULT - REASON FOR ADMISSION
Right TSA
Right TSA
elective right total shoulder arthroplasty
elective right total shoulder arthroplasty
tsr
tsr

## 2021-03-19 ENCOUNTER — NON-APPOINTMENT (OUTPATIENT)
Age: 66
End: 2021-03-19

## 2021-03-25 DIAGNOSIS — I45.10 UNSPECIFIED RIGHT BUNDLE-BRANCH BLOCK: ICD-10-CM

## 2021-03-25 DIAGNOSIS — E78.5 HYPERLIPIDEMIA, UNSPECIFIED: ICD-10-CM

## 2021-03-25 DIAGNOSIS — Z87.891 PERSONAL HISTORY OF NICOTINE DEPENDENCE: ICD-10-CM

## 2021-03-25 DIAGNOSIS — G47.33 OBSTRUCTIVE SLEEP APNEA (ADULT) (PEDIATRIC): ICD-10-CM

## 2021-03-25 DIAGNOSIS — F41.9 ANXIETY DISORDER, UNSPECIFIED: ICD-10-CM

## 2021-03-25 DIAGNOSIS — M19.011 PRIMARY OSTEOARTHRITIS, RIGHT SHOULDER: ICD-10-CM

## 2021-03-25 DIAGNOSIS — F32.9 MAJOR DEPRESSIVE DISORDER, SINGLE EPISODE, UNSPECIFIED: ICD-10-CM

## 2021-05-20 ENCOUNTER — APPOINTMENT (OUTPATIENT)
Dept: UROGYNECOLOGY | Facility: CLINIC | Age: 66
End: 2021-05-20
Payer: MEDICARE

## 2021-05-20 VITALS — SYSTOLIC BLOOD PRESSURE: 110 MMHG | DIASTOLIC BLOOD PRESSURE: 84 MMHG

## 2021-05-20 DIAGNOSIS — R35.0 FREQUENCY OF MICTURITION: ICD-10-CM

## 2021-05-20 DIAGNOSIS — R32 UNSPECIFIED URINARY INCONTINENCE: ICD-10-CM

## 2021-05-20 DIAGNOSIS — N39.41 URGE INCONTINENCE: ICD-10-CM

## 2021-05-20 LAB
BILIRUB UR QL STRIP: NEGATIVE
CLARITY UR: CLEAR
COLLECTION METHOD: NORMAL
GLUCOSE UR-MCNC: NEGATIVE
HCG UR QL: 0.2 EU/DL
HGB UR QL STRIP.AUTO: NEGATIVE
KETONES UR-MCNC: NEGATIVE
LEUKOCYTE ESTERASE UR QL STRIP: NORMAL
NITRITE UR QL STRIP: NEGATIVE
PH UR STRIP: 7
PROT UR STRIP-MCNC: NEGATIVE
SP GR UR STRIP: 1.02

## 2021-05-20 PROCEDURE — 51798 US URINE CAPACITY MEASURE: CPT

## 2021-05-20 PROCEDURE — 81003 URINALYSIS AUTO W/O SCOPE: CPT | Mod: QW

## 2021-05-20 PROCEDURE — 99213 OFFICE O/P EST LOW 20 MIN: CPT | Mod: 25

## 2021-05-20 NOTE — REASON FOR VISIT
[Urinary Incontinence] : urinary incontinence [Urinary Urgency] : urinary urgency [Urine Frequency] : urine frequency

## 2021-05-20 NOTE — HISTORY OF PRESENT ILLNESS
[FreeTextEntry1] : She checked with her insurance and the myrbetriq and it was too costly. She had shoulder replacement surgery and so she had to do PT for that. When she stopped the myrbetriq she realized it did in fact help a little but not where she thought. We reviewed her UDTs that showed JAYNA at low volumes but also she is complaining of urgency and frequency. She is frustrated with the leaking.

## 2021-05-20 NOTE — DISCUSSION/SUMMARY
[FreeTextEntry1] : We talked about the types of urinary incontinence and the treatment options. We reviewed physical therapy, medication, surgery, vaginal inserts and third line treatments. I gave her some literature on bulking and slings. I was able to answer all of her questions. She is going to think about it but leaning towards a sling. \par

## 2021-05-21 PROBLEM — G47.33 OBSTRUCTIVE SLEEP APNEA (ADULT) (PEDIATRIC): Chronic | Status: ACTIVE | Noted: 2020-10-16

## 2021-09-02 ENCOUNTER — APPOINTMENT (OUTPATIENT)
Dept: UROGYNECOLOGY | Facility: CLINIC | Age: 66
End: 2021-09-02
Payer: MEDICARE

## 2021-09-02 DIAGNOSIS — N39.3 STRESS INCONTINENCE (FEMALE) (MALE): ICD-10-CM

## 2021-09-02 PROCEDURE — 99214 OFFICE O/P EST MOD 30 MIN: CPT

## 2021-09-02 NOTE — HISTORY OF PRESENT ILLNESS
[FreeTextEntry1] : Here to discuss options. We reviewed UDTs that confirmed JAYNA. She does have the urgency and is aware the sling is unlikely to help with that.

## 2021-09-02 NOTE — DISCUSSION/SUMMARY
[FreeTextEntry1] : Ms. ETIENNE presented to the office today for counseling regarding her decision for surgical treatment of her urinary incontinence. All pertinent prior studies including urodynamics were reviewed. She was counseled regarding alternative nonsurgical therapies as well as the prognosis with no intervention.\par \par The patient was advised regarding various surgical options including abdominal, laparoscopic and vaginal approaches, allograft (harvesting ones alone tissue), xenograft (using tissue from donor), Impressa and synthetic grafts. The risks and benefits of surgery using graft insertion (mesh) were fully reviewed. She was informed of the potential for improved durability and the inherent risk of mesh use including but not limited to infection, erosion, pain, pain with intercourse, disturbance in bladder function, any of which may require additional surgery for mesh revision. She is aware that the mesh use and her surgery is a permanent mesh.\par \par The general risks of the surgery were reviewed including but not limited to infection, bleeding, surrounding organ or tissue injury, failure meaning continued leaking, voiding dysfunction, needing to go home with a catheter, and the risks of anesthesia.\par \par The approximate length of the surgery hospital stay and postoperative recovery period were reviewed, including a general overview of the convalescence and postoperative followup.\par \par The patient is aware that learner's (medical students/residents/fellows) may be participating in a pelvic exam under anesthesia.\par \par She verbalized a desire to proceed with surgery. Appropriate informed consent was obtained for sling and cysto. All questions were answered to the patient's satisfaction and we are looking to schedule her.

## 2021-10-25 ENCOUNTER — APPOINTMENT (OUTPATIENT)
Dept: UROGYNECOLOGY | Facility: HOSPITAL | Age: 66
End: 2021-10-25

## 2021-11-03 ENCOUNTER — APPOINTMENT (OUTPATIENT)
Dept: OBGYN | Facility: CLINIC | Age: 66
End: 2021-11-03
Payer: MEDICARE

## 2021-11-03 VITALS
WEIGHT: 185 LBS | SYSTOLIC BLOOD PRESSURE: 120 MMHG | DIASTOLIC BLOOD PRESSURE: 60 MMHG | BODY MASS INDEX: 30.82 KG/M2 | HEIGHT: 65 IN

## 2021-11-03 DIAGNOSIS — R39.15 URGENCY OF URINATION: ICD-10-CM

## 2021-11-03 DIAGNOSIS — M85.80 OTHER SPECIFIED DISORDERS OF BONE DENSITY AND STRUCTURE, UNSPECIFIED SITE: ICD-10-CM

## 2021-11-03 DIAGNOSIS — N39.3 STRESS INCONTINENCE (FEMALE) (MALE): ICD-10-CM

## 2021-11-03 PROCEDURE — 82270 OCCULT BLOOD FECES: CPT

## 2021-11-03 PROCEDURE — G0101: CPT

## 2021-11-03 RX ORDER — DARIFENACIN HYDROBROMIDE 7.5 MG/1
7.5 TABLET, EXTENDED RELEASE ORAL
Qty: 90 | Refills: 1 | Status: ACTIVE | COMMUNITY
Start: 2021-11-03 | End: 1900-01-01

## 2021-11-03 RX ORDER — ESTRADIOL 0.1 MG/G
0.1 CREAM VAGINAL
Qty: 1 | Refills: 3 | Status: ACTIVE | COMMUNITY
Start: 2021-11-03 | End: 1900-01-01

## 2021-11-03 NOTE — PHYSICAL EXAM
[Chaperone Present] : A chaperone was present in the examining room during all aspects of the physical examination [Appropriately responsive] : appropriately responsive [Alert] : alert [No Acute Distress] : no acute distress [No Lymphadenopathy] : no lymphadenopathy [Regular Rate Rhythm] : regular rate rhythm [No Murmurs] : no murmurs [Clear to Auscultation B/L] : clear to auscultation bilaterally [Soft] : soft [Non-tender] : non-tender [Non-distended] : non-distended [No HSM] : No HSM [No Lesions] : no lesions [No Mass] : no mass [Oriented x3] : oriented x3 [Examination Of The Breasts] : a normal appearance [No Masses] : no breast masses were palpable [Vulvar Atrophy] : vulvar atrophy [Labia Majora] : normal [Labia Minora] : normal [Atrophy] : atrophy [Normal] : normal [Absent] : absent [Uterine Adnexae] : normal [No Tenderness] : no tenderness [Nl Sphincter Tone] : normal sphincter tone [FreeTextEntry5] : Pap done [FreeTextEntry9] : Hemoccult negative

## 2021-11-03 NOTE — HISTORY OF PRESENT ILLNESS
[FreeTextEntry1] : Patient is a 66-year-old female presents for a gynecologic evaluation. Patient has no complaints. Patient's last mammogram was in the past year and last bone density was several years ago.Patient with urinary stress incontinence and urgency. Discussed this issue with patient patient has seen urogynecology. Patient advised to try enablex 7.5 milligrams while waiting to consider surgical intervention.

## 2021-11-04 ENCOUNTER — NON-APPOINTMENT (OUTPATIENT)
Age: 66
End: 2021-11-04

## 2021-11-15 ENCOUNTER — APPOINTMENT (OUTPATIENT)
Dept: UROGYNECOLOGY | Facility: CLINIC | Age: 66
End: 2021-11-15

## 2021-12-06 ENCOUNTER — APPOINTMENT (OUTPATIENT)
Dept: UROGYNECOLOGY | Facility: CLINIC | Age: 66
End: 2021-12-06

## 2022-07-11 NOTE — PATIENT PROFILE ADULT - DO YOU LACK THE NECESSARY SUPPORT TO HELP YOU COPE WITH LIFE CHALLENGES?
Medication/Dose: losartan 100 mg  Patient last seen by PCP: 06/01/2021  Next office visit with PCP: none scheduled   Last Lab:06/01/2021    Above information requires contacting patient: No    Prescription does not require PDMP check    Sent to provider to approve or deny yes

## 2022-09-14 ENCOUNTER — NON-APPOINTMENT (OUTPATIENT)
Age: 67
End: 2022-09-14

## 2022-11-23 ENCOUNTER — APPOINTMENT (OUTPATIENT)
Dept: OBGYN | Facility: CLINIC | Age: 67
End: 2022-11-23

## 2022-11-27 ENCOUNTER — INPATIENT (INPATIENT)
Facility: HOSPITAL | Age: 67
LOS: 3 days | Discharge: HOME | DRG: 872 | End: 2022-12-01
Attending: HOSPITALIST | Admitting: STUDENT IN AN ORGANIZED HEALTH CARE EDUCATION/TRAINING PROGRAM
Payer: MEDICARE

## 2022-11-27 VITALS
OXYGEN SATURATION: 94 % | HEART RATE: 104 BPM | WEIGHT: 190.04 LBS | SYSTOLIC BLOOD PRESSURE: 128 MMHG | HEIGHT: 65 IN | TEMPERATURE: 100 F | RESPIRATION RATE: 17 BRPM | DIASTOLIC BLOOD PRESSURE: 63 MMHG

## 2022-11-27 DIAGNOSIS — N39.0 URINARY TRACT INFECTION, SITE NOT SPECIFIED: ICD-10-CM

## 2022-11-27 DIAGNOSIS — Z98.890 OTHER SPECIFIED POSTPROCEDURAL STATES: Chronic | ICD-10-CM

## 2022-11-27 DIAGNOSIS — Z90.710 ACQUIRED ABSENCE OF BOTH CERVIX AND UTERUS: Chronic | ICD-10-CM

## 2022-11-27 DIAGNOSIS — Z96.659 PRESENCE OF UNSPECIFIED ARTIFICIAL KNEE JOINT: Chronic | ICD-10-CM

## 2022-11-27 DIAGNOSIS — Z96.611 PRESENCE OF RIGHT ARTIFICIAL SHOULDER JOINT: Chronic | ICD-10-CM

## 2022-11-27 LAB
ADD ON TEST-SPECIMEN IN LAB: SIGNIFICANT CHANGE UP
ALBUMIN SERPL ELPH-MCNC: 3.7 G/DL — SIGNIFICANT CHANGE UP (ref 3.3–5)
ALP SERPL-CCNC: 89 U/L — SIGNIFICANT CHANGE UP (ref 40–120)
ALT FLD-CCNC: 23 U/L — SIGNIFICANT CHANGE UP (ref 12–78)
ANION GAP SERPL CALC-SCNC: 12 MMOL/L — SIGNIFICANT CHANGE UP (ref 5–17)
ANION GAP SERPL CALC-SCNC: 5 MMOL/L — SIGNIFICANT CHANGE UP (ref 5–17)
APPEARANCE UR: CLEAR — SIGNIFICANT CHANGE UP
APTT BLD: 27.9 SEC — SIGNIFICANT CHANGE UP (ref 27.5–35.5)
AST SERPL-CCNC: 25 U/L — SIGNIFICANT CHANGE UP (ref 15–37)
BASOPHILS # BLD AUTO: 0 K/UL — SIGNIFICANT CHANGE UP (ref 0–0.2)
BASOPHILS # BLD AUTO: 0.03 K/UL — SIGNIFICANT CHANGE UP (ref 0–0.2)
BASOPHILS NFR BLD AUTO: 0 % — SIGNIFICANT CHANGE UP (ref 0–2)
BASOPHILS NFR BLD AUTO: 1 % — SIGNIFICANT CHANGE UP (ref 0–2)
BILIRUB SERPL-MCNC: 0.6 MG/DL — SIGNIFICANT CHANGE UP (ref 0.2–1.2)
BILIRUB UR-MCNC: NEGATIVE — SIGNIFICANT CHANGE UP
BUN SERPL-MCNC: 26 MG/DL — HIGH (ref 7–23)
BUN SERPL-MCNC: 28 MG/DL — HIGH (ref 7–23)
CALCIUM SERPL-MCNC: 8.6 MG/DL — SIGNIFICANT CHANGE UP (ref 8.5–10.1)
CALCIUM SERPL-MCNC: 8.9 MG/DL — SIGNIFICANT CHANGE UP (ref 8.5–10.1)
CHLORIDE SERPL-SCNC: 109 MMOL/L — HIGH (ref 96–108)
CHLORIDE SERPL-SCNC: 110 MMOL/L — HIGH (ref 96–108)
CO2 SERPL-SCNC: 20 MMOL/L — LOW (ref 22–31)
CO2 SERPL-SCNC: 24 MMOL/L — SIGNIFICANT CHANGE UP (ref 22–31)
COLOR SPEC: YELLOW — SIGNIFICANT CHANGE UP
CREAT SERPL-MCNC: 0.77 MG/DL — SIGNIFICANT CHANGE UP (ref 0.5–1.3)
CREAT SERPL-MCNC: 0.91 MG/DL — SIGNIFICANT CHANGE UP (ref 0.5–1.3)
DIFF PNL FLD: ABNORMAL
EGFR: 68 ML/MIN/1.73M2 — SIGNIFICANT CHANGE UP
EGFR: 83 ML/MIN/1.73M2 — SIGNIFICANT CHANGE UP
EOSINOPHIL # BLD AUTO: 0 K/UL — SIGNIFICANT CHANGE UP (ref 0–0.5)
EOSINOPHIL # BLD AUTO: 0.07 K/UL — SIGNIFICANT CHANGE UP (ref 0–0.5)
EOSINOPHIL NFR BLD AUTO: 0 % — SIGNIFICANT CHANGE UP (ref 0–6)
EOSINOPHIL NFR BLD AUTO: 2 % — SIGNIFICANT CHANGE UP (ref 0–6)
GLUCOSE SERPL-MCNC: 126 MG/DL — HIGH (ref 70–99)
GLUCOSE SERPL-MCNC: 89 MG/DL — SIGNIFICANT CHANGE UP (ref 70–99)
GLUCOSE UR QL: NEGATIVE — SIGNIFICANT CHANGE UP
HCT VFR BLD CALC: 41 % — SIGNIFICANT CHANGE UP (ref 34.5–45)
HCT VFR BLD CALC: 42.3 % — SIGNIFICANT CHANGE UP (ref 34.5–45)
HCV AB S/CO SERPL IA: 0.1 S/CO — SIGNIFICANT CHANGE UP (ref 0–0.99)
HCV AB SERPL-IMP: SIGNIFICANT CHANGE UP
HGB BLD-MCNC: 13.7 G/DL — SIGNIFICANT CHANGE UP (ref 11.5–15.5)
HGB BLD-MCNC: 14.3 G/DL — SIGNIFICANT CHANGE UP (ref 11.5–15.5)
INR BLD: 1.1 RATIO — SIGNIFICANT CHANGE UP (ref 0.88–1.16)
KETONES UR-MCNC: ABNORMAL
LACTATE SERPL-SCNC: 3.8 MMOL/L — HIGH (ref 0.7–2)
LEUKOCYTE ESTERASE UR-ACNC: ABNORMAL
LYMPHOCYTES # BLD AUTO: 0.24 K/UL — LOW (ref 1–3.3)
LYMPHOCYTES # BLD AUTO: 0.47 K/UL — LOW (ref 1–3.3)
LYMPHOCYTES # BLD AUTO: 3 % — LOW (ref 13–44)
LYMPHOCYTES # BLD AUTO: 7 % — LOW (ref 13–44)
MCHC RBC-ENTMCNC: 30.2 PG — SIGNIFICANT CHANGE UP (ref 27–34)
MCHC RBC-ENTMCNC: 30.2 PG — SIGNIFICANT CHANGE UP (ref 27–34)
MCHC RBC-ENTMCNC: 33.4 GM/DL — SIGNIFICANT CHANGE UP (ref 32–36)
MCHC RBC-ENTMCNC: 33.8 GM/DL — SIGNIFICANT CHANGE UP (ref 32–36)
MCV RBC AUTO: 89.4 FL — SIGNIFICANT CHANGE UP (ref 80–100)
MCV RBC AUTO: 90.3 FL — SIGNIFICANT CHANGE UP (ref 80–100)
MONOCYTES # BLD AUTO: 0.16 K/UL — SIGNIFICANT CHANGE UP (ref 0–0.9)
MONOCYTES # BLD AUTO: 0.27 K/UL — SIGNIFICANT CHANGE UP (ref 0–0.9)
MONOCYTES NFR BLD AUTO: 1 % — LOW (ref 2–14)
MONOCYTES NFR BLD AUTO: 8 % — SIGNIFICANT CHANGE UP (ref 2–14)
NEUTROPHILS # BLD AUTO: 14.73 K/UL — HIGH (ref 1.8–7.4)
NEUTROPHILS # BLD AUTO: 2.76 K/UL — SIGNIFICANT CHANGE UP (ref 1.8–7.4)
NEUTROPHILS NFR BLD AUTO: 78 % — HIGH (ref 43–77)
NEUTROPHILS NFR BLD AUTO: 80 % — HIGH (ref 43–77)
NITRITE UR-MCNC: POSITIVE
NRBC # BLD: SIGNIFICANT CHANGE UP /100 WBCS (ref 0–0)
NRBC # BLD: SIGNIFICANT CHANGE UP /100 WBCS (ref 0–0)
PH UR: 5 — SIGNIFICANT CHANGE UP (ref 5–8)
PLATELET # BLD AUTO: 150 K/UL — SIGNIFICANT CHANGE UP (ref 150–400)
PLATELET # BLD AUTO: 154 K/UL — SIGNIFICANT CHANGE UP (ref 150–400)
POTASSIUM SERPL-MCNC: 3.3 MMOL/L — LOW (ref 3.5–5.3)
POTASSIUM SERPL-MCNC: 3.4 MMOL/L — LOW (ref 3.5–5.3)
POTASSIUM SERPL-SCNC: 3.3 MMOL/L — LOW (ref 3.5–5.3)
POTASSIUM SERPL-SCNC: 3.4 MMOL/L — LOW (ref 3.5–5.3)
PROT SERPL-MCNC: 6.9 GM/DL — SIGNIFICANT CHANGE UP (ref 6–8.3)
PROT UR-MCNC: 30 MG/DL
PROTHROM AB SERPL-ACNC: 12.8 SEC — SIGNIFICANT CHANGE UP (ref 10.5–13.4)
RAPID RVP RESULT: SIGNIFICANT CHANGE UP
RBC # BLD: 4.54 M/UL — SIGNIFICANT CHANGE UP (ref 3.8–5.2)
RBC # BLD: 4.73 M/UL — SIGNIFICANT CHANGE UP (ref 3.8–5.2)
RBC # FLD: 12 % — SIGNIFICANT CHANGE UP (ref 10.3–14.5)
RBC # FLD: 12.3 % — SIGNIFICANT CHANGE UP (ref 10.3–14.5)
SARS-COV-2 RNA SPEC QL NAA+PROBE: SIGNIFICANT CHANGE UP
SODIUM SERPL-SCNC: 139 MMOL/L — SIGNIFICANT CHANGE UP (ref 135–145)
SODIUM SERPL-SCNC: 141 MMOL/L — SIGNIFICANT CHANGE UP (ref 135–145)
SP GR SPEC: 1.01 — SIGNIFICANT CHANGE UP (ref 1.01–1.02)
TROPONIN I, HIGH SENSITIVITY RESULT: 20.39 NG/L — SIGNIFICANT CHANGE UP
UROBILINOGEN FLD QL: NEGATIVE — SIGNIFICANT CHANGE UP
WBC # BLD: 15.51 K/UL — HIGH (ref 3.8–10.5)
WBC # BLD: 3.41 K/UL — LOW (ref 3.8–10.5)
WBC # FLD AUTO: 15.51 K/UL — HIGH (ref 3.8–10.5)
WBC # FLD AUTO: 3.41 K/UL — LOW (ref 3.8–10.5)

## 2022-11-27 PROCEDURE — 85025 COMPLETE CBC W/AUTO DIFF WBC: CPT

## 2022-11-27 PROCEDURE — 93010 ELECTROCARDIOGRAM REPORT: CPT

## 2022-11-27 PROCEDURE — 80048 BASIC METABOLIC PNL TOTAL CA: CPT

## 2022-11-27 PROCEDURE — 85027 COMPLETE CBC AUTOMATED: CPT

## 2022-11-27 PROCEDURE — 87040 BLOOD CULTURE FOR BACTERIA: CPT

## 2022-11-27 PROCEDURE — 99291 CRITICAL CARE FIRST HOUR: CPT | Mod: CS

## 2022-11-27 PROCEDURE — 83735 ASSAY OF MAGNESIUM: CPT

## 2022-11-27 PROCEDURE — 71045 X-RAY EXAM CHEST 1 VIEW: CPT | Mod: 26

## 2022-11-27 PROCEDURE — 83605 ASSAY OF LACTIC ACID: CPT

## 2022-11-27 PROCEDURE — 86803 HEPATITIS C AB TEST: CPT

## 2022-11-27 PROCEDURE — 76770 US EXAM ABDO BACK WALL COMP: CPT

## 2022-11-27 PROCEDURE — 84100 ASSAY OF PHOSPHORUS: CPT

## 2022-11-27 PROCEDURE — 36415 COLL VENOUS BLD VENIPUNCTURE: CPT

## 2022-11-27 PROCEDURE — 12345: CPT | Mod: NC

## 2022-11-27 PROCEDURE — 99223 1ST HOSP IP/OBS HIGH 75: CPT

## 2022-11-27 RX ORDER — ALPRAZOLAM 0.25 MG
1 TABLET ORAL
Qty: 0 | Refills: 0 | DISCHARGE

## 2022-11-27 RX ORDER — ENOXAPARIN SODIUM 100 MG/ML
40 INJECTION SUBCUTANEOUS EVERY 24 HOURS
Refills: 0 | Status: DISCONTINUED | OUTPATIENT
Start: 2022-11-27 | End: 2022-12-01

## 2022-11-27 RX ORDER — SODIUM CHLORIDE 9 MG/ML
1750 INJECTION INTRAMUSCULAR; INTRAVENOUS; SUBCUTANEOUS ONCE
Refills: 0 | Status: COMPLETED | OUTPATIENT
Start: 2022-11-27 | End: 2022-11-27

## 2022-11-27 RX ORDER — MAGNESIUM SULFATE 500 MG/ML
1 VIAL (ML) INJECTION ONCE
Refills: 0 | Status: COMPLETED | OUTPATIENT
Start: 2022-11-27 | End: 2022-11-27

## 2022-11-27 RX ORDER — SODIUM CHLORIDE 9 MG/ML
1000 INJECTION INTRAMUSCULAR; INTRAVENOUS; SUBCUTANEOUS
Refills: 0 | Status: COMPLETED | OUTPATIENT
Start: 2022-11-27 | End: 2022-11-27

## 2022-11-27 RX ORDER — CITALOPRAM 10 MG/1
40 TABLET, FILM COATED ORAL DAILY
Refills: 0 | Status: DISCONTINUED | OUTPATIENT
Start: 2022-11-27 | End: 2022-12-01

## 2022-11-27 RX ORDER — CEFTRIAXONE 500 MG/1
1000 INJECTION, POWDER, FOR SOLUTION INTRAMUSCULAR; INTRAVENOUS ONCE
Refills: 0 | Status: COMPLETED | OUTPATIENT
Start: 2022-11-27 | End: 2022-11-27

## 2022-11-27 RX ORDER — POTASSIUM CHLORIDE 20 MEQ
40 PACKET (EA) ORAL EVERY 4 HOURS
Refills: 0 | Status: COMPLETED | OUTPATIENT
Start: 2022-11-27 | End: 2022-11-27

## 2022-11-27 RX ORDER — ALPRAZOLAM 0.25 MG
0.5 TABLET ORAL DAILY
Refills: 0 | Status: DISCONTINUED | OUTPATIENT
Start: 2022-11-27 | End: 2022-12-01

## 2022-11-27 RX ORDER — ACETAMINOPHEN 500 MG
650 TABLET ORAL ONCE
Refills: 0 | Status: COMPLETED | OUTPATIENT
Start: 2022-11-27 | End: 2022-11-27

## 2022-11-27 RX ORDER — ONDANSETRON 8 MG/1
4 TABLET, FILM COATED ORAL EVERY 8 HOURS
Refills: 0 | Status: DISCONTINUED | OUTPATIENT
Start: 2022-11-27 | End: 2022-12-01

## 2022-11-27 RX ORDER — POLYETHYLENE GLYCOL 3350 17 G/17G
17 POWDER, FOR SOLUTION ORAL DAILY
Refills: 0 | Status: DISCONTINUED | OUTPATIENT
Start: 2022-11-27 | End: 2022-12-01

## 2022-11-27 RX ORDER — ACETAMINOPHEN 500 MG
650 TABLET ORAL EVERY 6 HOURS
Refills: 0 | Status: DISCONTINUED | OUTPATIENT
Start: 2022-11-27 | End: 2022-12-01

## 2022-11-27 RX ORDER — CEFTRIAXONE 500 MG/1
1000 INJECTION, POWDER, FOR SOLUTION INTRAMUSCULAR; INTRAVENOUS EVERY 24 HOURS
Refills: 0 | Status: DISCONTINUED | OUTPATIENT
Start: 2022-11-27 | End: 2022-11-28

## 2022-11-27 RX ORDER — CEFTRIAXONE 500 MG/1
1000 INJECTION, POWDER, FOR SOLUTION INTRAMUSCULAR; INTRAVENOUS ONCE
Refills: 0 | Status: DISCONTINUED | OUTPATIENT
Start: 2022-11-27 | End: 2022-11-27

## 2022-11-27 RX ORDER — PANTOPRAZOLE SODIUM 20 MG/1
40 TABLET, DELAYED RELEASE ORAL
Refills: 0 | Status: DISCONTINUED | OUTPATIENT
Start: 2022-11-27 | End: 2022-12-01

## 2022-11-27 RX ORDER — CHOLECALCIFEROL (VITAMIN D3) 125 MCG
0 CAPSULE ORAL
Qty: 0 | Refills: 0 | DISCHARGE

## 2022-11-27 RX ORDER — MIRABEGRON 50 MG/1
1 TABLET, EXTENDED RELEASE ORAL
Qty: 0 | Refills: 0 | DISCHARGE

## 2022-11-27 RX ORDER — CITALOPRAM 10 MG/1
1 TABLET, FILM COATED ORAL
Qty: 0 | Refills: 0 | DISCHARGE

## 2022-11-27 RX ORDER — ONDANSETRON 8 MG/1
4 TABLET, FILM COATED ORAL ONCE
Refills: 0 | Status: COMPLETED | OUTPATIENT
Start: 2022-11-27 | End: 2022-11-27

## 2022-11-27 RX ORDER — SODIUM CHLORIDE 9 MG/ML
1000 INJECTION INTRAMUSCULAR; INTRAVENOUS; SUBCUTANEOUS
Refills: 0 | Status: DISCONTINUED | OUTPATIENT
Start: 2022-11-27 | End: 2022-11-29

## 2022-11-27 RX ORDER — OMEPRAZOLE 10 MG/1
1 CAPSULE, DELAYED RELEASE ORAL
Qty: 0 | Refills: 0 | DISCHARGE

## 2022-11-27 RX ORDER — FENOFIBRATE,MICRONIZED 130 MG
1 CAPSULE ORAL
Qty: 0 | Refills: 0 | DISCHARGE

## 2022-11-27 RX ORDER — FENOFIBRATE,MICRONIZED 130 MG
145 CAPSULE ORAL DAILY
Refills: 0 | Status: DISCONTINUED | OUTPATIENT
Start: 2022-11-27 | End: 2022-12-01

## 2022-11-27 RX ORDER — POTASSIUM CHLORIDE 20 MEQ
40 PACKET (EA) ORAL ONCE
Refills: 0 | Status: COMPLETED | OUTPATIENT
Start: 2022-11-27 | End: 2022-11-27

## 2022-11-27 RX ORDER — LANOLIN ALCOHOL/MO/W.PET/CERES
3 CREAM (GRAM) TOPICAL AT BEDTIME
Refills: 0 | Status: DISCONTINUED | OUTPATIENT
Start: 2022-11-27 | End: 2022-12-01

## 2022-11-27 RX ADMIN — CEFTRIAXONE 1000 MILLIGRAM(S): 500 INJECTION, POWDER, FOR SOLUTION INTRAMUSCULAR; INTRAVENOUS at 04:06

## 2022-11-27 RX ADMIN — Medication 650 MILLIGRAM(S): at 06:35

## 2022-11-27 RX ADMIN — SODIUM CHLORIDE 1750 MILLILITER(S): 9 INJECTION INTRAMUSCULAR; INTRAVENOUS; SUBCUTANEOUS at 02:12

## 2022-11-27 RX ADMIN — Medication 100 GRAM(S): at 10:12

## 2022-11-27 RX ADMIN — ONDANSETRON 4 MILLIGRAM(S): 8 TABLET, FILM COATED ORAL at 09:57

## 2022-11-27 RX ADMIN — Medication 40 MILLIEQUIVALENT(S): at 10:12

## 2022-11-27 RX ADMIN — Medication 40 MILLIEQUIVALENT(S): at 11:36

## 2022-11-27 RX ADMIN — ONDANSETRON 4 MILLIGRAM(S): 8 TABLET, FILM COATED ORAL at 02:12

## 2022-11-27 RX ADMIN — Medication 145 MILLIGRAM(S): at 10:12

## 2022-11-27 RX ADMIN — Medication 650 MILLIGRAM(S): at 05:06

## 2022-11-27 RX ADMIN — CEFTRIAXONE 1000 MILLIGRAM(S): 500 INJECTION, POWDER, FOR SOLUTION INTRAMUSCULAR; INTRAVENOUS at 09:58

## 2022-11-27 RX ADMIN — Medication 40 MILLIEQUIVALENT(S): at 10:14

## 2022-11-27 RX ADMIN — Medication 0.5 MILLIGRAM(S): at 21:57

## 2022-11-27 RX ADMIN — ENOXAPARIN SODIUM 40 MILLIGRAM(S): 100 INJECTION SUBCUTANEOUS at 09:57

## 2022-11-27 RX ADMIN — PANTOPRAZOLE SODIUM 40 MILLIGRAM(S): 20 TABLET, DELAYED RELEASE ORAL at 08:25

## 2022-11-27 RX ADMIN — SODIUM CHLORIDE 75 MILLILITER(S): 9 INJECTION INTRAMUSCULAR; INTRAVENOUS; SUBCUTANEOUS at 09:57

## 2022-11-27 RX ADMIN — CITALOPRAM 40 MILLIGRAM(S): 10 TABLET, FILM COATED ORAL at 10:12

## 2022-11-27 NOTE — PATIENT PROFILE ADULT - DOMESTIC TRAVEL HIGH RISK QUESTION
[facility]  [facility] has an appointment made with PCP 2/5/21    They would like neosporin OTC as a standing order for chronic skin picking/open sores.    Nicotine lozenge (refill already on file)    [facility] number [facility]- [phone]   Fax [phone]  [place] RN- [phone]  Gave information for INR nurse- [phone]    A group [employer] employee will come to OV with patient in order to collect and take any forms, referrals, or signed paperwork back to [facility].      Crissy Pisano, RN    
VAHID Granados from [facility]    Best Time:  any    Can we leave a detailed message on this number? YES    Call taken on 1/27/2021 at 2:32 PM by Deandra Leahy      
No

## 2022-11-27 NOTE — ED ADULT NURSE NOTE - COMFORT/ACCEPTABLE PAIN LEVEL (0-10)
• 15 events were transmitted. 5 symptomatic; 10 device triggered  • Patient monitored for 2d 8h 58m  • 99 PACs with PAC burden of <1%  • 395 PVCs with PVC burden of <1%   0

## 2022-11-27 NOTE — ED PROVIDER NOTE - CRITICAL CARE ATTENDING CONTRIBUTION TO CARE
direct patient care (not related to procedure), additional history taking, interpretation of diagnostic studies, documentation, consultation with other physicians, consult w/ pt's family directly relating to pts condition  B Lisa WREN

## 2022-11-27 NOTE — ED PROVIDER NOTE - CLINICAL SUMMARY MEDICAL DECISION MAKING FREE TEXT BOX
Patient here with nausea found to be septic on vital signs will give medication for nausea IV fluid and empiric ceftriaxone

## 2022-11-27 NOTE — ED PROVIDER NOTE - PROGRESS NOTE DETAILS
Patient noted to have urinary tract infection patient then tells me that she had a urinary tract infection a week and a half ago and was taking Macrobid for.  She does not have fever.  Patient was treated for sepsis with fluids and ceftriaxone. GILDA Gonzalez DO After further consideration patient with failed oral antibiotic and leukopenia discussed with hospitalist recommendation is to admit patient agrees we will admit GILDA Gonzalez DO

## 2022-11-27 NOTE — ED PROVIDER NOTE - PHYSICAL EXAMINATION
Gen:  Well appearing in NAD  Head:  NC/AT  HEENT: pupils perrl,no pharyngeal erythema, uvula midline  Cardiac: S1S2, RRR  Abd: Soft, non tender  Resp: No distress, CTA   musculoskeletal:: no deformities, no swelling, strength +5/+5  Skin: warm and dry as visualized, no rashes  Neuro: MELCHOR, aao x 4  Psych:alert, cooperative, appropriate mood and affect for situation

## 2022-11-27 NOTE — H&P ADULT - NSHPREVIEWOFSYSTEMS_GEN_ALL_CORE
Constitutional: negative for fatigue, negative for fever, positive for chills, negative for decreased appetite.  Skin: negative for rashes, negative for open wounds, negative for jaundice.   Eyes: negative for blurry vision, negative for double vision.   Ears, nose, throat: negative for ear pain, negative for nasal congestion, negative for sore throat, negative for lymph node swelling.   Cardiovascular: negative for chest pain, negative for palpitations, negative for lower extremity swelling.   Respiratory: negative for shortness of breath, negative for wheezing, negative for cough.   Gastrointestinal: negative for abdominal pain, positive for nausea, positive for vomiting, negative for diarrhea, negative for constipation, negative for blood in the stool, negative for black tarry stools.   Genitourinary: negative for burning on urination, negative for urinary urgency or frequency, negative for blood in the urine.   Endocrine: negative for cold intolerance, negative for heat intolerance, negative for increased thirst.   Hematologic: negative for easy bruising or bleeding.   Musculoskeletal: negative for muscle/joint pain, negative for decreased range of motion.   Neurological: negative for dizziness, negative for headaches, negative for loss of consciousness, negative for motor weakness, negative for sensory deficits.   Psychiatric: negative for depression, negative for anxiety.

## 2022-11-27 NOTE — H&P ADULT - HISTORY OF PRESENT ILLNESS
66 y/o F with PMH anxiety and depression, osteopenia, osteoarthritis, urinary incontinence, ÁNGEL not on CPAP, RBBB, GERD, right ovarian cyst, s/p b/l knee replacements, s/p uvulopalatopharyngoplasty and tonsillectomy, right shoulder replacement, s/p MARIETTA presented with chills. Pt states that she went to bed around 11 pm and 1 hour later began having shaking/chills. She reports nausea and 1 episode of vomiting at home. She called 911 and came to the hospital where she had another episode of vomiting. Had a cold 3 weeks ago which has since subsided. She was having burning on urination and foul smelling urine on 11/11/22 and called her PCP who prescribed Macrobid She finished the course to completion. Denies fevers, chest pain, SOB, abdominal pain, diarrhea/constipation, back pain, blood in the urine.     ER course: Tmax 101.8F, , SpO2 94% on room air. Labs: WBC 3.41, neutrophils 78%, metamyelocytes 1.0%, lactate 3.8, K+ 3.4, carbon dioxide 20. UA: positive nitrite, moderate leukocyte esterase, moderate blood, WBC > 50, RBC 11-25, occasional bacteria. COVID and RVP negative.     EKG: pending, f/u result     Imaging:   - CXR: right shoulder replacement, no consolidation, no effusion, no pneumothorax (personally reviewed).    Pt was given Ceftriaxone, 1750 ml of NS, tylenol, zofran. She is being admitted to med/surg for further management.

## 2022-11-27 NOTE — INPATIENT CERTIFICATION FOR MEDICARE PATIENTS - IN ORDER TO MEET MEDICARE REQUIREMENTS.
In order to meet Medicare requirements, the clinical documentation must support the information cited in the admission order.  Please be sure to provide detailed and clear documentation about the following in the admitting note/history and physical:
Speaking Coherently

## 2022-11-27 NOTE — H&P ADULT - NSHPSOCIALHISTORY_GEN_ALL_CORE
Lives by herself. 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 Son lives close by. Denies smoking, alcohol, drug use.

## 2022-11-27 NOTE — ED PROVIDER NOTE - OBJECTIVE STATEMENT
67-year-old female with only surgical past medical history of right shoulder replacement presents with nausea tonight and chills. 5

## 2022-11-27 NOTE — ED PROVIDER NOTE - BIRTH SEX
We are committed to providing you with the best care possible. In order to help us achieve these goals please remember to bring all medications, herbal products, and over the counter supplements with you to each visit. If your provider has ordered testing for you, please be sure to follow up with our office if you have not received results within 7 days after the testing took place. *If you receive a survey after visiting one of our offices, please take time to share your experience concerning your physician office visit. These surveys are confidential and no health information about you is shared. We are eager to improve for you and we are counting on your feedback to help make that happen. Female

## 2022-11-27 NOTE — H&P ADULT - ASSESSMENT
66 y/o F presented with chills     1. Sepsis secondary to UTI (r/o bacteremia)   - Admit to med/surg   - Tmax 101.8F, , WBC 3.41, lactate 3.8, UA: positive nitrites   - s/p Ceftriaxone in the ER, will continue    - f/u UCx, BCx x 2, reflex lactic acid; adjust abx based on sensitivities  - Trend WBC, monitor for temperatures   - Tylenol for temperatures PRN   - s/p 1750 mL of NS, c/w 100 ml/hr overnight (monitor for fluid overload)   - Monitor BP closely     2. Elevation in metamyelocytes   - Metamyelocytes 1.0%, will monitor closely   - If persistent elevation despite tx of sepsis will consult Heme/Onc     3. Hypokalemia   - K+ 3.4, will replete   - f/u AM K+     4. History of anxiety and depression, osteopenia, osteoarthritis, urinary incontinence, ÁNGEL not on CPAP, RBBB, right ovarian cyst, s/p b/l knee replacements, s/p uvulopalatopharyngoplasty and tonsillectomy, right shoulder replacement   - c/w home medications; verified with pt at the bedside     DVT ppx: Lovenox 40 mg subcutaneous daily   Code status: Full code (pt agrees to chest compressions and intubation if required).   Emergency contact: Jarad Curry (son) [phone]

## 2022-11-27 NOTE — H&P ADULT - NSHPPHYSICALEXAM_GEN_ALL_CORE
ICU Vital Signs Last 24 Hrs  T(C): 37.2 (27 Nov 2022 06:18), Max: 38.8 (27 Nov 2022 01:48)  T(F): 99 (27 Nov 2022 06:18), Max: 101.8 (27 Nov 2022 01:48)  HR: 89 (27 Nov 2022 06:18) (89 - 104)  BP: 104/58 (27 Nov 2022 06:18) (104/58 - 128/63)  BP(mean): 69 (27 Nov 2022 06:18) (69 - 69)  RR: 20 (27 Nov 2022 06:18) (17 - 20)  SpO2: 95% (27 Nov 2022 06:18) (94% - 95%)    O2 Parameters below as of 27 Nov 2022 06:18  Patient On (Oxygen Delivery Method): room air    General: Awake and alert, cooperative with exam. No acute distress.   Skin: Warm, dry, and pink.   Eyes: Pupils equal and reactive to light. Extraocular eye movements intact. No conjunctival injection, discharge, or scleral icterus.   HEENT: Atraumatic, normocephalic. Moist mucus membranes.   Cardiology: Normal S1, S2. No murmurs, rubs, or gallops. Regular rate and rhythm.   Respiratory: Lungs clear to ascultation bilaterally. Good air exchange. No wheezes, rales, or rhonchi. Normal chest expansion.   Gastrointestinal: Positive bowel sounds. Soft, non-tender, non-distended. No guarding, rigidity, or rebound tenderness. No hepatosplenomegaly. No CVA tenderness.   Musculoskeletal: 5/5 motor strength in all extremities. Normal range of motion.   Extremities: No peripheral edema bilaterally. Dorsalis pedis pulses 2+ bilaterally.   Neurological: A+Ox3 (person, place, and time). Cranial nerves 2-12 intact. Normal speech. No facial droop. No focal neurological deficits.   Psychiatric: Normal affect. Normal mood.

## 2022-11-27 NOTE — ED ADULT TRIAGE NOTE - NS ED NURSE BANDS TYPE
Subjective:       Patient ID: Sarah Parker is a 76 y.o. female.    Chief Complaint:  Chronic myelomonocytic leukemia    Follow-up   Pertinent negatives include no abdominal pain, arthralgias, chest pain, chills, congestion, coughing, diaphoresis, fever, headaches, joint swelling, myalgias, nausea, neck pain, numbness, sore throat or vomiting.      Patient is a 76-year-old female presents for reinstitution  of Vidaza therapy for chronic myelomonocytic leukemia patient who is currently on treatment with azacitidine.  She is here for routine follow-up.      INTERVAL HISTORY:    Chronic myelomonocytic leukemia on Vidaza.  Status post 4 cycles.  Overall tolerated well. Recently treated for back abscess with antibiotics, however it appears the abscess has erupted and more aggressive per patient.  She was seen by myself on 05/11/2020 and at that time I recommend going to the emergency room for evaluation and possible incision and drainage. Patient presented to the emergency room on 05/12/2020 and at that time was noted to have cellulitis of back weight abscess.  She underwent incision and drainage by Dr. Shannon, she also had a wound VAC placed.  Microbiology showed MRSA.  Continues to follow-up with Dr. Shannon.    She was seen by myself on 06/01/2020 and at that time hemoglobin was noted to be at 6.7 grams/deciliter, she was transfused with 1 unit of irradiated packed red blood cell.  Today she presents for routine follow-up, continues to utilize the wound VAC.  Denies any symptoms including fever, chills, worsening lower back pain, nausea or diarrhea.  She denies chest pain.  She complains of fatigue, shortness of breath with minimal exertion.    Past Medical History:   Diagnosis Date    Acid reflux     Anxiety     Back pain     Bronchitis, chronic obstructive w acute bronchitis 7/29/2016    [facility]    Cataract     2+NS    Degenerative disc disease     Depression     Dry mouth      Hernia, hiatal 2013    Hypertension     Hypothyroid     Macrocytic anemia 5/3/2016    Macular degeneration     Migraines     Mixed anxiety and depressive disorder     Multiple fractures of ribs of right side     Osteoporosis     Other hyperlipidemia 10/11/2019    Pneumonia     Pneumonia due to other staphylococcus     Rheumatoid arthritis     Rheumatoid arthritis(714.0)     Rheumatoid arthritis(714.0)     Remicade, MTX.     Family History   Problem Relation Age of Onset    Heart disease Mother     Hyperlipidemia Mother     Hypertension Mother     Osteoarthritis Mother     Cataracts Mother     Hypertension Father     Osteoarthritis Father     Heart disease Father     Asthma Sister     Chronic back pain Sister     Hypertension Sister     Osteoarthritis Sister     Thyroid disease Sister     Asthma Brother     Cancer Brother     Chronic back pain Brother     Diabetes Mellitus Brother     Hypertension Brother     Osteoarthritis Brother     Thyroid disease Brother     Cancer Maternal Grandfather     Fibromyalgia Daughter     Heart disease Maternal Grandmother     Colon cancer Neg Hx     Diabetes Neg Hx      Social History     Socioeconomic History    Marital status:      Spouse name: Not on file    Number of children: Not on file    Years of education: Not on file    Highest education level: Not on file   Occupational History    Occupation: retired   Social Needs    Financial resource strain: Not on file    Food insecurity:     Worry: Not on file     Inability: Not on file    Transportation needs:     Medical: Not on file     Non-medical: Not on file   Tobacco Use    Smoking status: Former Smoker     Packs/day: 0.25     Years: 2.00     Pack years: 0.50     Last attempt to quit: 1965     Years since quittin.6    Smokeless tobacco: Never Used   Substance and Sexual Activity    Alcohol use: No    Drug use: No    Sexual activity: Never      Partners: Male   Lifestyle    Physical activity:     Days per week: Not on file     Minutes per session: Not on file    Stress: Not at all   Relationships    Social connections:     Talks on phone: Not on file     Gets together: Not on file     Attends Yazidism service: Not on file     Active member of club or organization: Not on file     Attends meetings of clubs or organizations: Not on file     Relationship status: Not on file   Other Topics Concern    Not on file   Social History Narrative    Patient is aretired and live with .     Past Surgical History:   Procedure Laterality Date    APPENDECTOMY  1985    APPLICATION OF WOUND VACUUM-ASSISTED CLOSURE DEVICE N/A 5/14/2020    Procedure: APPLICATION, WOUND VAC;  Surgeon: Mckinley Shannon MD;  Location: [facility] OR;  Service: General;  Laterality: N/A;    BREAST BIOPSY      CATARACT EXTRACTION Bilateral 6/11/15    Dr. Booth    CHOLECYSTECTOMY  2013    cryoablasion kidney Left 09/27/2016    feet Bilateral     rheumatoid    FRACTURE SURGERY Right     tibia    HERNIA REPAIR      HYSTERECTOMY  1970    partial    INCISION AND DRAINAGE OF ABSCESS N/A 5/12/2020    Procedure: INCISION AND DRAINAGE, ABSCESS;  Surgeon: Emerson Hathaway MD;  Location: [facility] OR;  Service: General;  Laterality: N/A;    INCISIONAL BIOPSY N/A 5/12/2020    Procedure: INCISIONAL BIOPSY;  Surgeon: Emerson Hathaway MD;  Location: [facility] OR;  Service: General;  Laterality: N/A;    JOINT REPLACEMENT      bilateral knees (2008), hands, wrists, knuckles, toes    LAPAROSCOPIC NISSEN FUNDOPLICATION      TRANSFORAMINAL EPIDURAL INJECTION OF STEROID Left 6/25/2019    Procedure: Left L5/S1 TF AYAAN with local;  Surgeon: Rowdy Felix MD;  Location[facility];  Service: Pain Management;  Laterality: Left;    WOUND DEBRIDEMENT N/A 5/14/2020    Procedure: DEBRIDEMENT, WOUND;  Surgeon: Mckinley Shannon MD;  Location: [facility] OR;  Service: General;  Laterality: N/A;       Labs:  Lab Results   Component  Value Date    WBC 21.38 (H) 06/09/2020    HGB 6.0 (L) 06/09/2020    HCT 20.9 (L) 06/09/2020    MCV 97 06/09/2020    PLT 51 (L) 06/09/2020     BMP  Lab Results   Component Value Date     06/09/2020    K 4.3 06/09/2020     06/09/2020    CO2 23 06/09/2020    BUN 16 06/09/2020    CREATININE 0.9 06/09/2020    CALCIUM 8.6 (L) 06/09/2020    ANIONGAP 9 06/09/2020    ESTGFRAFRICA >60 06/09/2020    EGFRNONAA >60 06/09/2020     Lab Results   Component Value Date    ALT 9 (L) 06/09/2020    AST 19 06/09/2020    ALKPHOS 178 (H) 06/09/2020    BILITOT 0.5 06/09/2020       Lab Results   Component Value Date    IRON 93 01/17/2020    TIBC 425 01/17/2020    FERRITIN 276 01/17/2020     Lab Results   Component Value Date    JOXQORHP98 1918 (H) 08/25/2019     Lab Results   Component Value Date    FOLATE 12.0 08/25/2019     Lab Results   Component Value Date    TSH 5.174 (H) 09/03/2019         Review of Systems   Constitutional: Positive for activity change. Negative for chills, diaphoresis and fever.   HENT: Negative for congestion, dental problem, drooling, ear discharge, ear pain, facial swelling, hearing loss, mouth sores, nosebleeds, postnasal drip, rhinorrhea, sinus pressure, sneezing, sore throat, tinnitus, trouble swallowing and voice change.    Eyes: Negative for photophobia, pain, discharge, redness, itching and visual disturbance.   Respiratory: Negative for cough, choking, chest tightness, shortness of breath, wheezing and stridor.    Cardiovascular: Negative for chest pain, palpitations and leg swelling.   Gastrointestinal: Negative for abdominal distention, abdominal pain, anal bleeding, blood in stool, constipation, diarrhea, nausea, rectal pain and vomiting.   Endocrine: Negative for cold intolerance, heat intolerance, polydipsia, polyphagia and polyuria.   Genitourinary: Negative for decreased urine volume, difficulty urinating, dyspareunia, dysuria, enuresis, flank pain, frequency, genital sores, hematuria,  menstrual problem, pelvic pain, urgency, vaginal bleeding, vaginal discharge and vaginal pain.   Musculoskeletal: Negative for arthralgias, gait problem, joint swelling, myalgias, neck pain and neck stiffness.   Skin: Negative for color change and pallor.   Allergic/Immunologic: Negative for environmental allergies, food allergies and immunocompromised state.   Neurological: Negative for dizziness, tremors, seizures, syncope, facial asymmetry, speech difficulty, light-headedness, numbness and headaches.   Hematological: Negative for adenopathy. Does not bruise/bleed easily.   Psychiatric/Behavioral: Positive for dysphoric mood. Negative for agitation, behavioral problems, confusion, decreased concentration, hallucinations, self-injury, sleep disturbance and suicidal ideas. The patient is nervous/anxious. The patient is not hyperactive.        Objective:      Physical Exam   Constitutional: She is oriented to person, place, and time. She appears cachectic.   HENT:   Head: Normocephalic and atraumatic.   Right Ear: External ear normal.   Left Ear: External ear normal.   Nose: Nose normal. Right sinus exhibits no maxillary sinus tenderness and no frontal sinus tenderness. Left sinus exhibits no maxillary sinus tenderness and no frontal sinus tenderness.   Mouth/Throat: Oropharynx is clear and moist. No oropharyngeal exudate.   Eyes: Pupils are equal, round, and reactive to light. Conjunctivae, EOM and lids are normal. Right eye exhibits no discharge. Left eye exhibits no discharge. Right conjunctiva is not injected. Right conjunctiva has no hemorrhage. Left conjunctiva is not injected. Left conjunctiva has no hemorrhage. No scleral icterus.   Neck: Normal range of motion. Neck supple. No JVD present. No tracheal deviation present. No thyromegaly present.   Cardiovascular: Normal rate and regular rhythm.   Pulmonary/Chest: Effort normal. No stridor. No respiratory distress. She exhibits no tenderness.   Abdominal: Soft.  She exhibits no distension and no mass. There is no splenomegaly or hepatomegaly. There is no tenderness. There is no rebound.   Musculoskeletal: Normal range of motion. She exhibits deformity. She exhibits no edema or tenderness.   Deformed digits of upper extremity secondary to arthritis.   Lymphadenopathy:     She has no cervical adenopathy.     She has no axillary adenopathy.        Right: No supraclavicular adenopathy present.        Left: No supraclavicular adenopathy present.   Neurological: She is alert and oriented to person, place, and time. No cranial nerve deficit. Coordination normal.   Skin: Skin is dry. She is not diaphoretic. No erythema.   Wound VAC in place to the posterior lower back wound.  Draining serosanguineous fluid.   Psychiatric: She has a normal mood and affect. Her behavior is normal. Judgment and thought content normal.   Vitals reviewed.        Assessment:      1. Chronic myelomonocytic leukemia not having achieved remission    2. Iron deficiency anemia due to chronic blood loss    3. Thrombocytopenia    4. Cellulitis of lower back    5. Macrocytic anemia    6. Leukocytosis, unspecified type           Plan:     Chronic myelomonocytic leukemia not having achieved remission  Chronic myelomonocytic leukemia, currently on Vidaza which has been on hold recently due to Guerra infection.  Reviewed labs today and noted......  Will continue to hold Vidaza, return to clinic in 2 weeks with repeat labs.    Thrombocytopenia  Severe thrombocytopenia, no evidence of active bleed.  Noted platelet count at 188[address].  No petechiae or ecchymosis on exam.  Will continue to monitor.  Likely this is related to CMML.    Cellulitis of lower back  Completed antibiotics.  Currently on wound VAC and follows with surgery service.    Macrocytic anemia  Normocytic anemia, likely due to chronic inflammation secondary to rheumatoid arthritis versus chemotherapy effect versus bone marrow disorder from CMML.  Patient has  received multiple blood products with minimal improvement in hemoglobin.  Endoscopic evaluation was recommended however she declined stating that she had colonoscopy within the past 5 years.  She was signed release of information form today so that we can obtain record from outside facility where the procedure was done.  Meanwhile has CBC showed hemoglobin of 6.0 grams/deciliter today.  Will plan to transfuse with 2 units of irradiated packed red blood cell.  Will see her back in about 2-3 weeks or sooner if needed.    Leukocytosis  Leukocytosis is secondary to CMML.  No evidence of infection.        Denton Knox MD       Name band;

## 2022-11-27 NOTE — H&P ADULT - NSICDXPASTSURGICALHX_GEN_ALL_CORE_FT
PAST SURGICAL HISTORY:  S/P hysterectomy     S/P knee replacement left and right    S/P shoulder replacement, right     S/P UPPP (uvulopalatopharyngoplasty) 2014 with tonsillectomy

## 2022-11-27 NOTE — ED ADULT NURSE NOTE - OBJECTIVE STATEMENT
Patient presented to the ED from home c/o n/v. Patient states symptoms began around 5 pm 11/26. Patient denies abd pain, chest pain, shortness of breath. Patient alert and oriented. Patient is ambulatory. Patient resting in be w family member at bedside.

## 2022-11-27 NOTE — H&P ADULT - NSICDXFAMILYHX_GEN_ALL_CORE_FT
FAMILY HISTORY:  Family history of sepsis, mother    Father  Still living? Unknown  Family history of pancreatitis, Age at diagnosis: Age Unknown    Mother  Still living? Unknown  Family history of pancreatitis, Age at diagnosis: Age Unknown

## 2022-11-28 LAB
ANION GAP SERPL CALC-SCNC: 5 MMOL/L — SIGNIFICANT CHANGE UP (ref 5–17)
BUN SERPL-MCNC: 24 MG/DL — HIGH (ref 7–23)
CALCIUM SERPL-MCNC: 9.2 MG/DL — SIGNIFICANT CHANGE UP (ref 8.5–10.1)
CHLORIDE SERPL-SCNC: 111 MMOL/L — HIGH (ref 96–108)
CO2 SERPL-SCNC: 25 MMOL/L — SIGNIFICANT CHANGE UP (ref 22–31)
CREAT SERPL-MCNC: 0.8 MG/DL — SIGNIFICANT CHANGE UP (ref 0.5–1.3)
E COLI DNA BLD POS QL NAA+NON-PROBE: SIGNIFICANT CHANGE UP
EGFR: 80 ML/MIN/1.73M2 — SIGNIFICANT CHANGE UP
GLUCOSE SERPL-MCNC: 107 MG/DL — HIGH (ref 70–99)
GRAM STN FLD: SIGNIFICANT CHANGE UP
HCT VFR BLD CALC: 42.9 % — SIGNIFICANT CHANGE UP (ref 34.5–45)
HGB BLD-MCNC: 13.9 G/DL — SIGNIFICANT CHANGE UP (ref 11.5–15.5)
MCHC RBC-ENTMCNC: 30.1 PG — SIGNIFICANT CHANGE UP (ref 27–34)
MCHC RBC-ENTMCNC: 32.4 GM/DL — SIGNIFICANT CHANGE UP (ref 32–36)
MCV RBC AUTO: 92.9 FL — SIGNIFICANT CHANGE UP (ref 80–100)
METHOD TYPE: SIGNIFICANT CHANGE UP
PLATELET # BLD AUTO: 132 K/UL — LOW (ref 150–400)
POTASSIUM SERPL-MCNC: 4.6 MMOL/L — SIGNIFICANT CHANGE UP (ref 3.5–5.3)
POTASSIUM SERPL-SCNC: 4.6 MMOL/L — SIGNIFICANT CHANGE UP (ref 3.5–5.3)
RBC # BLD: 4.62 M/UL — SIGNIFICANT CHANGE UP (ref 3.8–5.2)
RBC # FLD: 12.7 % — SIGNIFICANT CHANGE UP (ref 10.3–14.5)
SODIUM SERPL-SCNC: 141 MMOL/L — SIGNIFICANT CHANGE UP (ref 135–145)
SPECIMEN SOURCE: SIGNIFICANT CHANGE UP
SPECIMEN SOURCE: SIGNIFICANT CHANGE UP
WBC # BLD: 8.11 K/UL — SIGNIFICANT CHANGE UP (ref 3.8–10.5)
WBC # FLD AUTO: 8.11 K/UL — SIGNIFICANT CHANGE UP (ref 3.8–10.5)

## 2022-11-28 PROCEDURE — 99233 SBSQ HOSP IP/OBS HIGH 50: CPT

## 2022-11-28 RX ORDER — CEFTRIAXONE 500 MG/1
2000 INJECTION, POWDER, FOR SOLUTION INTRAMUSCULAR; INTRAVENOUS EVERY 24 HOURS
Refills: 0 | Status: DISCONTINUED | OUTPATIENT
Start: 2022-11-28 | End: 2022-11-28

## 2022-11-28 RX ORDER — LACTOBACILLUS ACIDOPHILUS 100MM CELL
1 CAPSULE ORAL
Refills: 0 | Status: DISCONTINUED | OUTPATIENT
Start: 2022-11-28 | End: 2022-12-01

## 2022-11-28 RX ORDER — CEFTRIAXONE 500 MG/1
2000 INJECTION, POWDER, FOR SOLUTION INTRAMUSCULAR; INTRAVENOUS EVERY 24 HOURS
Refills: 0 | Status: DISCONTINUED | OUTPATIENT
Start: 2022-11-28 | End: 2022-12-01

## 2022-11-28 RX ADMIN — PANTOPRAZOLE SODIUM 40 MILLIGRAM(S): 20 TABLET, DELAYED RELEASE ORAL at 15:41

## 2022-11-28 RX ADMIN — Medication 1 TABLET(S): at 18:27

## 2022-11-28 RX ADMIN — Medication 650 MILLIGRAM(S): at 22:55

## 2022-11-28 RX ADMIN — CITALOPRAM 40 MILLIGRAM(S): 10 TABLET, FILM COATED ORAL at 11:30

## 2022-11-28 RX ADMIN — Medication 3 MILLIGRAM(S): at 22:55

## 2022-11-28 RX ADMIN — Medication 145 MILLIGRAM(S): at 11:30

## 2022-11-28 RX ADMIN — CEFTRIAXONE 1000 MILLIGRAM(S): 500 INJECTION, POWDER, FOR SOLUTION INTRAMUSCULAR; INTRAVENOUS at 11:30

## 2022-11-28 RX ADMIN — ENOXAPARIN SODIUM 40 MILLIGRAM(S): 100 INJECTION SUBCUTANEOUS at 11:31

## 2022-11-28 RX ADMIN — Medication 650 MILLIGRAM(S): at 11:45

## 2022-11-28 RX ADMIN — Medication 0.5 MILLIGRAM(S): at 22:54

## 2022-11-28 RX ADMIN — Medication 650 MILLIGRAM(S): at 05:56

## 2022-11-28 NOTE — PROGRESS NOTE ADULT - SUBJECTIVE AND OBJECTIVE BOX
CC: Chills (2022 06:20)    HPI: 68 y/o F with PMH anxiety and depression, osteopenia, osteoarthritis, urinary incontinence, ÁNGEL not on CPAP, RBBB, GERD, right ovarian cyst, s/p b/l knee replacements, s/p uvulopalatopharyngoplasty and tonsillectomy, right shoulder replacement, s/p MARIETTA presented with chills. Pt states that she went to bed around 11 pm and 1 hour later began having shaking/chills. She reports nausea and 1 episode of vomiting at home. She called 911 and came to the hospital where she had another episode of vomiting. Had a cold 3 weeks ago which has since subsided. She was having burning on urination and foul smelling urine on 22 and called her PCP who prescribed Macrobid She finished the course to completion. Denies fevers, chest pain, SOB, abdominal pain, diarrhea/constipation, back pain, blood in the urine.     ER course: Tmax 101.8F, , SpO2 94% on room air. Labs: WBC 3.41, neutrophils 78%, metamyelocytes 1.0%, lactate 3.8, K+ 3.4, carbon dioxide 20. UA: positive nitrite, moderate leukocyte esterase, moderate blood, WBC > 50, RBC 11-25, occasional bacteria. COVID and RVP negative.     EKG: pending, f/u result     Imaging:   - CXR: right shoulder replacement, no consolidation, no effusion, no pneumothorax (personally reviewed).    Pt was given Ceftriaxone, 1750 ml of NS, tylenol, zofran. She is being admitted to med/surg for further management.      INTERVAL HPI/ OVERNIGHT EVENTS:    Vital Signs Last 24 Hrs  T(C): 37.7 (2022 05:51), Max: 37.7 (2022 05:51)  T(F): 99.8 (2022 05:51), Max: 99.8 (2022 05:51)  HR: 83 (2022 05:40) (73 - 83)  BP: 127/87 (2022 05:40) (98/54 - 127/87)  BP(mean): 98 (2022 05:40) (65 - 98)  RR: 18 (2022 05:40) (18 - 19)  SpO2: 99% (2022 05:40) (95% - 99%)    Parameters below as of 2022 05:40  Patient On (Oxygen Delivery Method): room air      I&O's Detail    REVIEW OF SYSTEMS:   All other review of systems is negative unless indicated above.    PHYSICAL EXAM:  General: Well developed; well nourished; in no acute distress  Eyes: PERRLA, EOMI; conjunctiva and sclera clear  Head: Normocephalic; atraumatic  ENMT: No nasal discharge; airway clear  Neck: Supple; non tender; no masses  Respiratory: No wheezes, rales or rhonchi  Cardiovascular: Regular rate and rhythm. S1 and S2 Normal; No murmurs, gallops or rubs  Gastrointestinal: Soft non-tender non-distended; Normal bowel sounds  Genitourinary: No  suprapubic  tenderness  Extremities: Normal range of motion, No clubbing, cyanosis or edema  Vascular: Peripheral pulses palpable 2+ bilaterally  Neurological: Alert and oriented x4  Skin: Warm and dry. No acute rash  Lymph Nodes: No acute cervical adenopathy  Musculoskeletal: Normal muscle tone, without deformities  Psychiatric: Cooperative and appropriate      LABS:                           13.9   8.11  )-----------( 132      ( 2022 05:41 )             42.9     2022 05:41    141    |  111    |  24     ----------------------------<  107    4.6     |  25     |  0.80     Ca    9.2        2022 05:41  Mg     1.7       2022 06:51    TPro  6.9    /  Alb  3.7    /  TBili  0.6    /  DBili  x      /  AST  25     /  ALT  23     /  AlkPhos  89     2022 01:41  PT/INR - ( 2022 01:41 )   PT: 12.8 sec;   INR: 1.10 ratio    PTT - ( 2022 01:41 )  PTT:27.9 sec          LIVER FUNCTIONS - ( 2022 01:41 )  Alb: 3.7 g/dL / Pro: 6.9 gm/dL / ALK PHOS: 89 U/L / ALT: 23 U/L / AST: 25 U/L / GGT: x           Urinalysis Basic - ( 2022 03:23 )    Color: Yellow / Appearance: Clear / S.015 / pH: x  Gluc: x / Ketone: Trace  / Bili: Negative / Urobili: Negative   Blood: x / Protein: 30 mg/dL / Nitrite: Positive   Leuk Esterase: Moderate / RBC: 11-25 /HPF / WBC >50   Sq Epi: x / Non Sq Epi: Occasional / Bacteria: Occasional    Culture - Blood (22 @ 02:09)   Gram Stain:   Growth in aerobic bottle: Gram Negative Rods   Growth in anaerobic bottle: Gram Negative Rods   Specimen Source: .Blood None   Culture Results:   Growth in aerobic bottle: Gram Negative Rods   Growth in anaerobic bottle: Gram Negative Rods     MEDICATIONS  (STANDING):  cefTRIAXone Injectable. 1000 milliGRAM(s) IV Push every 24 hours  citalopram 40 milliGRAM(s) Oral daily  enoxaparin Injectable 40 milliGRAM(s) SubCutaneous every 24 hours  fenofibrate Tablet 145 milliGRAM(s) Oral daily  pantoprazole    Tablet 40 milliGRAM(s) Oral before breakfast  sodium chloride 0.9%. 1000 milliLiter(s) (100 mL/Hr) IV Continuous <Continuous>    MEDICATIONS  (PRN):  acetaminophen     Tablet .. 650 milliGRAM(s) Oral every 6 hours PRN Temp greater or equal to 38C (100.4F), Mild Pain (1 - 3)  ALPRAZolam 0.5 milliGRAM(s) Oral daily PRN anxiety  aluminum hydroxide/magnesium hydroxide/simethicone Suspension 30 milliLiter(s) Oral every 4 hours PRN Dyspepsia  melatonin 3 milliGRAM(s) Oral at bedtime PRN Insomnia  ondansetron Injectable 4 milliGRAM(s) IV Push every 8 hours PRN Nausea and/or Vomiting  polyethylene glycol 3350 17 Gram(s) Oral daily PRN Constipation      RADIOLOGY & ADDITIONAL TESTS:   CC: Chills (2022 06:20)    HPI: 68 y/o F with PMH anxiety and depression, osteopenia, osteoarthritis, urinary incontinence, ÁNGEL not on CPAP, RBBB, GERD, right ovarian cyst, s/p b/l knee replacements, s/p uvulopalatopharyngoplasty and tonsillectomy, right shoulder replacement, s/p MARIETTA presented with chills. Pt states that she went to bed around 11 pm and 1 hour later began having shaking/chills. She reports nausea and 1 episode of vomiting at home. She called 911 and came to the hospital where she had another episode of vomiting. Had a cold 3 weeks ago which has since subsided. She was having burning on urination and foul smelling urine on 22 and called her PCP who prescribed Macrobid She finished the course to completion. Denies fevers, chest pain, SOB, abdominal pain, diarrhea/constipation, back pain, blood in the urine.     ER course: Tmax 101.8F, , SpO2 94% on room air. Labs: WBC 3.41, neutrophils 78%, metamyelocytes 1.0%, lactate 3.8, K+ 3.4, carbon dioxide 20. UA: positive nitrite, moderate leukocyte esterase, moderate blood, WBC > 50, RBC 11-25, occasional bacteria. COVID and RVP negative.     EKG: pending, f/u result     Imaging:   - CXR: right shoulder replacement, no consolidation, no effusion, no pneumothorax (personally reviewed).    Pt was given Ceftriaxone, 1750 ml of NS, tylenol, zofran. She is being admitted to med/surg for further management.      INTERVAL HPI/ OVERNIGHT EVENTS: Pt was seen and examined had rigors last night, feels better now, better oral intake, denies nausea, no dizziness, feels weak. POC discussed         Vital Signs Last 24 Hrs  T(C): 37.7 (2022 05:51), Max: 37.7 (2022 05:51)  T(F): 99.8 (2022 05:51), Max: 99.8 (2022 05:51)  HR: 83 (2022 05:40) (73 - 83)  BP: 127/87 (2022 05:40) (98/54 - 127/87)  BP(mean): 98 (2022 05:40) (65 - 98)  RR: 18 (2022 05:40) (18 - 19)  SpO2: 99% (2022 05:40) (95% - 99%)    Parameters below as of 2022 05:40  Patient On (Oxygen Delivery Method): room air      REVIEW OF SYSTEMS:   All other review of systems is negative unless indicated above.    PHYSICAL EXAM:  General: Well developed; well nourished; in no acute distress  Eyes: PERRLA, EOMI; conjunctiva and sclera clear  Head: Normocephalic; atraumatic  ENMT: No nasal discharge; airway clear  Neck: Supple; non tender; no masses  Respiratory: No wheezes, rales or rhonchi  Cardiovascular: Regular rate and rhythm. S1 and S2 Normal; No murmurs, gallops or rubs  Gastrointestinal: Soft non-tender non-distended; Normal bowel sounds  Genitourinary: No  suprapubic  tenderness  Extremities: Normal range of motion, No clubbing, cyanosis or edema  Vascular: Peripheral pulses palpable 2+ bilaterally  Neurological: Alert and oriented x4  Skin: Warm and dry. No acute rash  Lymph Nodes: No acute cervical adenopathy  Musculoskeletal: Normal muscle tone, without deformities  Psychiatric: Cooperative and appropriate      LABS:                           13.9   8.11  )-----------( 132      ( 2022 05:41 )             42.9     2022 05:41    141    |  111    |  24     ----------------------------<  107    4.6     |  25     |  0.80     Ca    9.2        2022 05:41  Mg     1.7       2022 06:51    TPro  6.9    /  Alb  3.7    /  TBili  0.6    /  DBili  x      /  AST  25     /  ALT  23     /  AlkPhos  89     2022 01:41  PT/INR - ( 2022 01:41 )   PT: 12.8 sec;   INR: 1.10 ratio    PTT - ( 2022 01:41 )  PTT:27.9 sec          LIVER FUNCTIONS - ( 2022 01:41 )  Alb: 3.7 g/dL / Pro: 6.9 gm/dL / ALK PHOS: 89 U/L / ALT: 23 U/L / AST: 25 U/L / GGT: x           Urinalysis Basic - ( 2022 03:23 )    Color: Yellow / Appearance: Clear / S.015 / pH: x  Gluc: x / Ketone: Trace  / Bili: Negative / Urobili: Negative   Blood: x / Protein: 30 mg/dL / Nitrite: Positive   Leuk Esterase: Moderate / RBC: 11-25 /HPF / WBC >50   Sq Epi: x / Non Sq Epi: Occasional / Bacteria: Occasional    Culture - Blood (22 @ 02:09)   Gram Stain:   Growth in aerobic bottle: Gram Negative Rods   Growth in anaerobic bottle: Gram Negative Rods   Specimen Source: .Blood None   Culture Results:   Growth in aerobic bottle: Gram Negative Rods   Growth in anaerobic bottle: Gram Negative Rods     MEDICATIONS  (STANDING):  cefTRIAXone Injectable. 1000 milliGRAM(s) IV Push every 24 hours  citalopram 40 milliGRAM(s) Oral daily  enoxaparin Injectable 40 milliGRAM(s) SubCutaneous every 24 hours  fenofibrate Tablet 145 milliGRAM(s) Oral daily  pantoprazole    Tablet 40 milliGRAM(s) Oral before breakfast  sodium chloride 0.9%. 1000 milliLiter(s) (100 mL/Hr) IV Continuous <Continuous>    MEDICATIONS  (PRN):  acetaminophen     Tablet .. 650 milliGRAM(s) Oral every 6 hours PRN Temp greater or equal to 38C (100.4F), Mild Pain (1 - 3)  ALPRAZolam 0.5 milliGRAM(s) Oral daily PRN anxiety  aluminum hydroxide/magnesium hydroxide/simethicone Suspension 30 milliLiter(s) Oral every 4 hours PRN Dyspepsia  melatonin 3 milliGRAM(s) Oral at bedtime PRN Insomnia  ondansetron Injectable 4 milliGRAM(s) IV Push every 8 hours PRN Nausea and/or Vomiting  polyethylene glycol 3350 17 Gram(s) Oral daily PRN Constipation      RADIOLOGY & ADDITIONAL TESTS:

## 2022-11-28 NOTE — CONSULT NOTE ADULT - ASSESSMENT
68 y/o Female with h/o anxiety and depression, osteopenia, osteoarthritis, urinary incontinence, ÁNGEL not on CPAP, RBBB, GERD, right ovarian cyst, b/l knee replacements, uvulopalatopharyngoplasty and tonsillectomy, right shoulder replacement, MARIETTA was admitted on 1/27 for fever and chills. Pt states that she went to bed around 11 pm on the day PTA and 1 hour later began having shaking/chills. She reports nausea and 1 episode of vomiting at home. She called 911 and came to the hospital where she had another episode of vomiting. Had a cold 3 weeks ago which has since subsided. She was having burning on urination and foul smelling urine on 11/11/22 and called her PCP who prescribed Macrobid. She finished the course to completion. In Er she was noted febrile to 101.8F and received ceftriaxone.     1. Sepsis with GNR. UTI with E.coli.  -febrile syndrome  -leukocytosis  -concerned about more resistant organisms since the patient failed recent outpatient oral abx therapy  -obtain BC x 2, urine c/s  -start ceftriaxone 2 gm IV qd  -reason for abx use and side effects reviewed with patient; monitor BMP   -old chart reviewed to assess prior cultures  -monitor temps  -f/u CBC  -supportive care  2. Other issues:   -care per medicine     68 y/o Female with h/o anxiety and depression, osteopenia, osteoarthritis, urinary incontinence, ÁNGEL not on CPAP, RBBB, GERD, right ovarian cyst, b/l knee replacements, uvulopalatopharyngoplasty and tonsillectomy, right shoulder replacement, MARIETTA was admitted on 1/27 for fever and chills. Pt states that she went to bed around 11 pm on the day PTA and 1 hour later began having shaking/chills. She reports nausea and 1 episode of vomiting at home. She called 911 and came to the hospital where she had another episode of vomiting. Had a cold 3 weeks ago which has since subsided. She was having burning on urination and foul smelling urine on 11/11/22 and called her PCP who prescribed Macrobid. She finished the course to completion. In Er she was noted febrile to 101.8F and received ceftriaxone.     1. Sepsis with GNR. UTI with E.coli.  -febrile syndrome  -leukocytosis  -concerned about more resistant organisms since the patient failed recent outpatient oral abx therapy  -obtain BC x 2, urine c/s  -start ceftriaxone 2 gm IV qd  -reason for abx use and side effects reviewed with patient; monitor BMP   -old chart reviewed to assess prior cultures  -monitor temps  -f/u CBC  -supportive care  2. Other issues:   -care per medicine    d/w medical team and HCP by phone

## 2022-11-28 NOTE — CONSULT NOTE ADULT - SUBJECTIVE AND OBJECTIVE BOX
Patient is a 67y old  Female who presents with a chief complaint of Chills    HPI:  68 y/o Female with h/o anxiety and depression, osteopenia, osteoarthritis, urinary incontinence, ÁNGEL not on CPAP, RBBB, GERD, right ovarian cyst, b/l knee replacements, uvulopalatopharyngoplasty and tonsillectomy, right shoulder replacement, MARIETTA was admitted on  for fever and chills. Pt states that she went to bed around 11 pm on the day PTA and 1 hour later began having shaking/chills. She reports nausea and 1 episode of vomiting at home. She called 911 and came to the hospital where she had another episode of vomiting. Had a cold 3 weeks ago which has since subsided. She was having burning on urination and foul smelling urine on 22 and called her PCP who prescribed Macrobid. She finished the course to completion. In Er she was noted febrile to 101.8F and received ceftriaxone.     PMH: as above  PSH: as above  Meds: per reconciliation sheet, noted below  MEDICATIONS  (STANDING):  cefTRIAXone Injectable. 1000 milliGRAM(s) IV Push every 24 hours  citalopram 40 milliGRAM(s) Oral daily  enoxaparin Injectable 40 milliGRAM(s) SubCutaneous every 24 hours  fenofibrate Tablet 145 milliGRAM(s) Oral daily  pantoprazole    Tablet 40 milliGRAM(s) Oral before breakfast  sodium chloride 0.9%. 1000 milliLiter(s) (100 mL/Hr) IV Continuous <Continuous>    MEDICATIONS  (PRN):  acetaminophen     Tablet .. 650 milliGRAM(s) Oral every 6 hours PRN Temp greater or equal to 38C (100.4F), Mild Pain (1 - 3)  ALPRAZolam 0.5 milliGRAM(s) Oral daily PRN anxiety  aluminum hydroxide/magnesium hydroxide/simethicone Suspension 30 milliLiter(s) Oral every 4 hours PRN Dyspepsia  melatonin 3 milliGRAM(s) Oral at bedtime PRN Insomnia  ondansetron Injectable 4 milliGRAM(s) IV Push every 8 hours PRN Nausea and/or Vomiting  polyethylene glycol 3350 17 Gram(s) Oral daily PRN Constipation    Allergies    No Known Allergies    Intolerances      Social: no smoking, no alcohol, no illegal drugs; no recent travel, no exposure to TB  FAMILY HISTORY:  Family history of sepsis  mother    Family history of pancreatitis (Father, Mother)      no history of premature cardiovascular disease in first degree relatives    ROS: the patient denies HA, no seizures, no dizziness, no sore throat, no nasal congestion, no blurry vision, no CP, no palpitations, no SOB, no cough, no abdominal pain, no diarrhea, no N/V, has dysuria, no leg pain, no claudication, no rash, no joint aches, no rectal pain or bleeding, no night sweats  All other systems reviewed and are negative    Vital Signs Last 24 Hrs  T(C): 36.8 (:51), Max: 37.7 (2022 05:51)  T(F): 98.2 (:51), Max: 99.8 (:51)  HR: 81 (:51) (73 - 83)  BP: 98/66 (:51) (98/54 - 127/87)  BP(mean): 98 (2022 05:40) (65 - 98)  RR: 18 (:51) (18 - 19)  SpO2: 93% (:51) (93% - 99%)    Parameters below as of :51  Patient On (Oxygen Delivery Method): room air    PE:    Constitutional:  No acute distress  HEENT: NC/AT, EOMI, PERRLA, conjunctivae clear; ears and nose atraumatic; pharynx benign  Neck: supple; thyroid not palpable  Back: no tenderness  Respiratory: respiratory effort normal; clear to auscultation  Cardiovascular: S1S2 regular, no murmurs  Abdomen: soft, not tender, not distended, positive BS; no liver or spleen organomegaly  Genitourinary: has suprapubic tenderness  Lymphatic: no LN palpable  Musculoskeletal: no muscle tenderness, no joint swelling or tenderness  Extremities: no pedal edema  Neurological/ Psychiatric: AxOx3, judgement and insight normal; moving all extremities  Skin: no rashes; no palpable lesions    Labs: all available labs reviewed                        13.9   8.11  )-----------( 132      ( 2022 05:41 )             42.9     11    141  |  111<H>  |  24<H>  ----------------------------<  107<H>  4.6   |  25  |  0.80    Ca    9.2      2022 05:41  Mg     1.7         TPro  6.9  /  Alb  3.7  /  TBili  0.6  /  DBili  x   /  AST  25  /  ALT  23  /  AlkPhos  89       LIVER FUNCTIONS - ( 2022 01:41 )  Alb: 3.7 g/dL / Pro: 6.9 gm/dL / ALK PHOS: 89 U/L / ALT: 23 U/L / AST: 25 U/L / GGT: x           Urinalysis Basic - ( 2022 03:23 )    Color: Yellow / Appearance: Clear / S.015 / pH: x  Gluc: x / Ketone: Trace  / Bili: Negative / Urobili: Negative   Blood: x / Protein: 30 mg/dL / Nitrite: Positive   Leuk Esterase: Moderate / RBC: 11-25 /HPF / WBC >50   Sq Epi: x / Non Sq Epi: Occasional / Bacteria: Occasional    ( @ 01:41)  NotDete      Culture - Urine (collected 2022 03:23)  Source: Clean Catch None  Preliminary Report (2022 10:31):    >100,000 CFU/ml Escherichia coli    Culture - Blood (collected 2022 02:09)  Source: .Blood None  Gram Stain (2022 08:28):    Growth in aerobic bottle: Gram Negative Rods    Growth in anaerobic bottle: Gram Negative Rods  Preliminary Report (2022 08:28):    Growth in aerobic bottle: Gram Negative Rods    Growth in anaerobic bottle: Gram Negative Rods    Culture - Blood (collected 2022 02:09)  Source: .Blood None  Gram Stain (2022 06:12):    Growth in aerobic and anaerobic bottles: Gram Negative Rods  Preliminary Report (2022 06:12):    Growth in aerobic and anaerobic bottles: Gram Negative Rods  Organism: Blood Culture PCR (2022 09:20)      -  Escherichia coli: Detec      Method Type: PCR    Radiology: all available radiological tests reviewed        Advanced directives addressed: full resuscitation

## 2022-11-28 NOTE — PROGRESS NOTE ADULT - ASSESSMENT
68 y/o F presented with chills     1. Sepsis secondary to UTI (r/o bacteremia)   - Admit to med/surg   - Tmax 101.8F, , WBC 3.41, lactate 3.8, UA: positive nitrites   - s/p Ceftriaxone in the ER, will continue    - f/u UCx, BCx x 2, reflex lactic acid; adjust abx based on sensitivities  - Trend WBC, monitor for temperatures   - Tylenol for temperatures PRN   - s/p 1750 mL of NS, c/w 100 ml/hr overnight (monitor for fluid overload)   - Monitor BP closely     2. Elevation in metamyelocytes   - Metamyelocytes 1.0%, will monitor closely   - If persistent elevation despite tx of sepsis will consult Heme/Onc     3. Hypokalemia   - K+ 3.4, will replete   - f/u AM K+     4. History of anxiety and depression, osteopenia, osteoarthritis, urinary incontinence, ÁNGEL not on CPAP, RBBB, right ovarian cyst, s/p b/l knee replacements, s/p uvulopalatopharyngoplasty and tonsillectomy, right shoulder replacement   - c/w home medications; verified with pt at the bedside     DVT ppx: Lovenox 40 mg subcutaneous daily   Code status: Full code (pt agrees to chest compressions and intubation if required).   Emergency contact: Jarad Curry (son) [phone] 68 y/o F presented with chills     1. Sepsis/ ECOLI Bacteremia secondary to UTI  - fevers improved   - IVC: +GNR  - BCX: + ECOLI, f/u sensitivities   - C/w Ceftriaxone, dose adjusted   - S/p IVF   - Trend WBC, monitor for temperatures   - Tylenol for temperatures PRN   - Monitor   closely     2. Elevation in metamyelocytes   - Metamyelocytes 1.0%, will monitor closely   - suspect due to sepsis/bacteremia  - will need repeat cbc with dif when improves     3. Hypokalemia   - replaced  - monitor     4.  Nausea/Vomiting, due to sepsis  - improved   - S/p IVF  - Encourage oral intake  - Zofran PRN       5. Anxiety/depression  - C/w Citalopram, Xanax PRN    6. DVT ppx: Lovenox 40 mg subcutaneous daily     Code status: Full code (pt agrees to chest compressions and intubation if required).   Emergency contact: Jarad Curry (son) [phone]

## 2022-11-28 NOTE — CHART NOTE - NSCHARTNOTEFT_GEN_A_CORE
Notified about positive blood cultures for Gram negative rods. Patient is on Rocephin, c/w management. Will make day team aware.

## 2022-11-29 LAB
-  AMIKACIN: SIGNIFICANT CHANGE UP
-  AMIKACIN: SIGNIFICANT CHANGE UP
-  AMOXICILLIN/CLAVULANIC ACID: SIGNIFICANT CHANGE UP
-  AMPICILLIN/SULBACTAM: SIGNIFICANT CHANGE UP
-  AMPICILLIN/SULBACTAM: SIGNIFICANT CHANGE UP
-  AMPICILLIN: SIGNIFICANT CHANGE UP
-  AMPICILLIN: SIGNIFICANT CHANGE UP
-  AZTREONAM: SIGNIFICANT CHANGE UP
-  AZTREONAM: SIGNIFICANT CHANGE UP
-  CEFAZOLIN: SIGNIFICANT CHANGE UP
-  CEFAZOLIN: SIGNIFICANT CHANGE UP
-  CEFEPIME: SIGNIFICANT CHANGE UP
-  CEFEPIME: SIGNIFICANT CHANGE UP
-  CEFOXITIN: SIGNIFICANT CHANGE UP
-  CEFOXITIN: SIGNIFICANT CHANGE UP
-  CEFTRIAXONE: SIGNIFICANT CHANGE UP
-  CEFTRIAXONE: SIGNIFICANT CHANGE UP
-  CIPROFLOXACIN: SIGNIFICANT CHANGE UP
-  CIPROFLOXACIN: SIGNIFICANT CHANGE UP
-  ERTAPENEM: SIGNIFICANT CHANGE UP
-  ERTAPENEM: SIGNIFICANT CHANGE UP
-  GENTAMICIN: SIGNIFICANT CHANGE UP
-  GENTAMICIN: SIGNIFICANT CHANGE UP
-  IMIPENEM: SIGNIFICANT CHANGE UP
-  IMIPENEM: SIGNIFICANT CHANGE UP
-  LEVOFLOXACIN: SIGNIFICANT CHANGE UP
-  LEVOFLOXACIN: SIGNIFICANT CHANGE UP
-  MEROPENEM: SIGNIFICANT CHANGE UP
-  MEROPENEM: SIGNIFICANT CHANGE UP
-  NITROFURANTOIN: SIGNIFICANT CHANGE UP
-  PIPERACILLIN/TAZOBACTAM: SIGNIFICANT CHANGE UP
-  PIPERACILLIN/TAZOBACTAM: SIGNIFICANT CHANGE UP
-  TOBRAMYCIN: SIGNIFICANT CHANGE UP
-  TOBRAMYCIN: SIGNIFICANT CHANGE UP
-  TRIMETHOPRIM/SULFAMETHOXAZOLE: SIGNIFICANT CHANGE UP
-  TRIMETHOPRIM/SULFAMETHOXAZOLE: SIGNIFICANT CHANGE UP
ANION GAP SERPL CALC-SCNC: 3 MMOL/L — LOW (ref 5–17)
BILIRUB SERPL-MCNC: 0.5 MG/DL — SIGNIFICANT CHANGE UP (ref 0.2–1.2)
BUN SERPL-MCNC: 12 MG/DL — SIGNIFICANT CHANGE UP (ref 7–23)
CALCIUM SERPL-MCNC: 8.6 MG/DL — SIGNIFICANT CHANGE UP (ref 8.5–10.1)
CHLORIDE SERPL-SCNC: 112 MMOL/L — HIGH (ref 96–108)
CO2 SERPL-SCNC: 25 MMOL/L — SIGNIFICANT CHANGE UP (ref 22–31)
CREAT SERPL-MCNC: 0.69 MG/DL — SIGNIFICANT CHANGE UP (ref 0.5–1.3)
CREAT SERPL-MCNC: 0.7 MG/DL — SIGNIFICANT CHANGE UP (ref 0.5–1.3)
CULTURE RESULTS: SIGNIFICANT CHANGE UP
EGFR: 94 ML/MIN/1.73M2 — SIGNIFICANT CHANGE UP
EGFR: 94 ML/MIN/1.73M2 — SIGNIFICANT CHANGE UP
GLUCOSE SERPL-MCNC: 106 MG/DL — HIGH (ref 70–99)
HCT VFR BLD CALC: 39.8 % — SIGNIFICANT CHANGE UP (ref 34.5–45)
HGB BLD-MCNC: 13 G/DL — SIGNIFICANT CHANGE UP (ref 11.5–15.5)
INR BLD: 1.07 RATIO — SIGNIFICANT CHANGE UP (ref 0.88–1.16)
MCHC RBC-ENTMCNC: 29.9 PG — SIGNIFICANT CHANGE UP (ref 27–34)
MCHC RBC-ENTMCNC: 32.7 GM/DL — SIGNIFICANT CHANGE UP (ref 32–36)
MCV RBC AUTO: 91.5 FL — SIGNIFICANT CHANGE UP (ref 80–100)
MELD SCORE WITH DIALYSIS: 20 POINTS — SIGNIFICANT CHANGE UP
MELD SCORE WITHOUT DIALYSIS: 7 POINTS — SIGNIFICANT CHANGE UP
METHOD TYPE: SIGNIFICANT CHANGE UP
METHOD TYPE: SIGNIFICANT CHANGE UP
ORGANISM # SPEC MICROSCOPIC CNT: SIGNIFICANT CHANGE UP
PLATELET # BLD AUTO: 135 K/UL — LOW (ref 150–400)
POTASSIUM SERPL-MCNC: 4.2 MMOL/L — SIGNIFICANT CHANGE UP (ref 3.5–5.3)
POTASSIUM SERPL-SCNC: 4.2 MMOL/L — SIGNIFICANT CHANGE UP (ref 3.5–5.3)
PROTHROM AB SERPL-ACNC: 12.4 SEC — SIGNIFICANT CHANGE UP (ref 10.5–13.4)
RBC # BLD: 4.35 M/UL — SIGNIFICANT CHANGE UP (ref 3.8–5.2)
RBC # FLD: 12.6 % — SIGNIFICANT CHANGE UP (ref 10.3–14.5)
SODIUM SERPL-SCNC: 140 MMOL/L — SIGNIFICANT CHANGE UP (ref 135–145)
SODIUM SERPL-SCNC: 140 MMOL/L — SIGNIFICANT CHANGE UP (ref 135–145)
SPECIMEN SOURCE: SIGNIFICANT CHANGE UP
WBC # BLD: 5.88 K/UL — SIGNIFICANT CHANGE UP (ref 3.8–10.5)
WBC # FLD AUTO: 5.88 K/UL — SIGNIFICANT CHANGE UP (ref 3.8–10.5)

## 2022-11-29 PROCEDURE — 99232 SBSQ HOSP IP/OBS MODERATE 35: CPT

## 2022-11-29 PROCEDURE — 76770 US EXAM ABDO BACK WALL COMP: CPT | Mod: 26

## 2022-11-29 RX ADMIN — Medication 1 TABLET(S): at 18:03

## 2022-11-29 RX ADMIN — Medication 650 MILLIGRAM(S): at 22:48

## 2022-11-29 RX ADMIN — ENOXAPARIN SODIUM 40 MILLIGRAM(S): 100 INJECTION SUBCUTANEOUS at 09:08

## 2022-11-29 RX ADMIN — CEFTRIAXONE 2000 MILLIGRAM(S): 500 INJECTION, POWDER, FOR SOLUTION INTRAMUSCULAR; INTRAVENOUS at 09:09

## 2022-11-29 RX ADMIN — PANTOPRAZOLE SODIUM 40 MILLIGRAM(S): 20 TABLET, DELAYED RELEASE ORAL at 05:11

## 2022-11-29 RX ADMIN — Medication 145 MILLIGRAM(S): at 09:09

## 2022-11-29 RX ADMIN — CITALOPRAM 40 MILLIGRAM(S): 10 TABLET, FILM COATED ORAL at 09:09

## 2022-11-29 RX ADMIN — Medication 650 MILLIGRAM(S): at 21:48

## 2022-11-29 RX ADMIN — Medication 0.5 MILLIGRAM(S): at 21:49

## 2022-11-29 RX ADMIN — Medication 3 MILLIGRAM(S): at 21:49

## 2022-11-29 RX ADMIN — Medication 1 TABLET(S): at 09:09

## 2022-11-29 NOTE — PROGRESS NOTE ADULT - SUBJECTIVE AND OBJECTIVE BOX
Date of service: 22 @ 08:55    Lying in bed in NAD  Has lower abdominal discomfort  Urine is more clear  No loose stools  Fever is down    ROS: denies dizziness, no HA, no SOB or cough, no dysuria, no legs pain, no rashes    MEDICATIONS  (STANDING):  cefTRIAXone Injectable. 2000 milliGRAM(s) IV Push every 24 hours  citalopram 40 milliGRAM(s) Oral daily  enoxaparin Injectable 40 milliGRAM(s) SubCutaneous every 24 hours  fenofibrate Tablet 145 milliGRAM(s) Oral daily  lactobacillus acidophilus 1 Tablet(s) Oral two times a day with meals  pantoprazole    Tablet 40 milliGRAM(s) Oral before breakfast  sodium chloride 0.9%. 1000 milliLiter(s) (100 mL/Hr) IV Continuous <Continuous>    Vital Signs Last 24 Hrs  T(C): 36.6 (2022 08:11), Max: 36.8 (2022 09:51)  T(F): 97.9 (2022 08:11), Max: 98.2 (2022 09:51)  HR: 64 (2022 08:11) (64 - 81)  BP: 121/64 (2022 08:11) (98/66 - 121/64)  BP(mean): 73 (2022 16:40) (73 - 73)  RR: 18 (2022 08:11) (18 - 18)  SpO2: 98% (2022 08:11) (93% - 98%)    Parameters below as of 2022 08:11  Patient On (Oxygen Delivery Method): room air     Physical exam:    Constitutional:  No acute distress  HEENT: NC/AT, EOMI, PERRLA, conjunctivae clear; ears and nose atraumatic  Neck: supple; thyroid not palpable  Back: no tenderness  Respiratory: respiratory effort normal; clear to auscultation  Cardiovascular: S1S2 regular, no murmurs  Abdomen: soft, not tender, not distended, positive BS  Genitourinary: has suprapubic tenderness  Lymphatic: no LN palpable  Musculoskeletal: no muscle tenderness, no joint swelling or tenderness  Extremities: no pedal edema  Neurological/ Psychiatric: AxOx3, moving all extremities  Skin: no rashes; no palpable lesions    Labs: reviewed                        13.0   5.88  )-----------( 135      ( 2022 06:08 )             39.8         140  |  112<H>  |  12  ----------------------------<  106<H>  4.2   |  25  |  0.70    Ca    8.6      2022 06:08    TPro  x   /  Alb  x   /  TBili  0.5  /  DBili  x   /  AST  x   /  ALT  x   /  AlkPhos  x                           13.9   8.11  )-----------( 132      ( 2022 05:41 )             42.9         141  |  111<H>  |  24<H>  ----------------------------<  107<H>  4.6   |  25  |  0.80    Ca    9.2      2022 05:41  Mg     1.7         TPro  6.9  /  Alb  3.7  /  TBili  0.6  /  DBili  x   /  AST  25  /  ALT  23  /  AlkPhos  89       LIVER FUNCTIONS - ( 2022 01:41 )  Alb: 3.7 g/dL / Pro: 6.9 gm/dL / ALK PHOS: 89 U/L / ALT: 23 U/L / AST: 25 U/L / GGT: x           Urinalysis Basic - ( 2022 03:23 )    Color: Yellow / Appearance: Clear / S.015 / pH: x  Gluc: x / Ketone: Trace  / Bili: Negative / Urobili: Negative   Blood: x / Protein: 30 mg/dL / Nitrite: Positive   Leuk Esterase: Moderate / RBC: 11-25 /HPF / WBC >50   Sq Epi: x / Non Sq Epi: Occasional / Bacteria: Occasional    ( @ 01:41)  NotDete      Culture - Urine (collected 2022 03:23)  Source: Clean Catch None  Preliminary Report (2022 10:31):    >100,000 CFU/ml Escherichia coli    Culture - Blood (collected 2022 02:09)  Source: .Blood None  Gram Stain (2022 08:28):    Growth in aerobic bottle: Gram Negative Rods    Growth in anaerobic bottle: Gram Negative Rods  Preliminary Report (2022 08:28):    Growth in aerobic bottle: Gram Negative Rods    Growth in anaerobic bottle: Gram Negative Rods    Culture - Blood (collected 2022 02:09)  Source: .Blood None  Gram Stain (2022 06:12):    Growth in aerobic and anaerobic bottles: Gram Negative Rods  Preliminary Report (2022 06:12):    Growth in aerobic and anaerobic bottles: Gram Negative Rods  Organism: Blood Culture PCR (2022 09:20)      -  Escherichia coli: Detec      Method Type: PCR    Radiology: all available radiological tests reviewed        Advanced directives addressed: full resuscitation

## 2022-11-29 NOTE — PROGRESS NOTE ADULT - ASSESSMENT
66 y/o Female with h/o anxiety and depression, osteopenia, osteoarthritis, urinary incontinence, ÁNGEL not on CPAP, RBBB, GERD, right ovarian cyst, b/l knee replacements, uvulopalatopharyngoplasty and tonsillectomy, right shoulder replacement, MARIETTA was admitted on 1/27 for fever and chills. Pt states that she went to bed around 11 pm on the day PTA and 1 hour later began having shaking/chills. She reports nausea and 1 episode of vomiting at home. She called 911 and came to the hospital where she had another episode of vomiting. Had a cold 3 weeks ago which has since subsided. She was having burning on urination and foul smelling urine on 11/11/22 and called her PCP who prescribed Macrobid. She finished the course to completion. In Er she was noted febrile to 101.8F and received ceftriaxone.     1. Sepsis with E.coli. UTI with E.coli.  -febrile syndrome improving  -leukocytosis improving  -concerned about more resistant organisms since the patient failed recent outpatient oral abx therapy  -BC x 2, urine c/s noted  -on ceftriaxone 2 gm IV qd # 2  -tolerating abx well so far; no side effects noted  -repeat BC x 2 in AM  -continue abx coverage   -monitor temps  -f/u CBC  -supportive care  2. Other issues:   -care per medicine    d/w medical team and HCP by phone

## 2022-11-29 NOTE — PROGRESS NOTE ADULT - SUBJECTIVE AND OBJECTIVE BOX
CC: Chills (2022 06:20)    HPI: 66 y/o F with PMH anxiety and depression, osteopenia, osteoarthritis, urinary incontinence, ÁNGEL not on CPAP, RBBB, GERD, right ovarian cyst, s/p b/l knee replacements, s/p uvulopalatopharyngoplasty and tonsillectomy, right shoulder replacement, s/p MARIETTA presented with chills. Pt states that she went to bed around 11 pm and 1 hour later began having shaking/chills. She reports nausea and 1 episode of vomiting at home. She called 911 and came to the hospital where she had another episode of vomiting. Had a cold 3 weeks ago which has since subsided. She was having burning on urination and foul smelling urine on 22 and called her PCP who prescribed Macrobid She finished the course to completion. Denies fevers, chest pain, SOB, abdominal pain, diarrhea/constipation, back pain, blood in the urine.     ER course: Tmax 101.8F, , SpO2 94% on room air. Labs: WBC 3.41, neutrophils 78%, metamyelocytes 1.0%, lactate 3.8, K+ 3.4, carbon dioxide 20. UA: positive nitrite, moderate leukocyte esterase, moderate blood, WBC > 50, RBC 11-25, occasional bacteria. COVID and RVP negative.     EKG: pending, f/u result     Imaging:   - CXR: right shoulder replacement, no consolidation, no effusion, no pneumothorax (personally reviewed).    Pt was given Ceftriaxone, 1750 ml of NS, tylenol, zofran. She is being admitted to med/surg for further management.      INTERVAL HPI/ OVERNIGHT EVENTS: Pt was seen and examined had rigors last night, feels better now, better oral intake, denies nausea, no dizziness, feels weak. POC discussed     : Feeling well. More energy. No more rigors and chills. Urine clear and       Vital Signs Last 24 Hrs  T(C): 37.7 (2022 05:51), Max: 37.7 (2022 05:51)  T(F): 99.8 (2022 05:51), Max: 99.8 (2022 05:51)  HR: 83 (2022 05:40) (73 - 83)  BP: 127/87 (2022 05:40) (98/54 - 127/87)  BP(mean): 98 (2022 05:40) (65 - 98)  RR: 18 (2022 05:40) (18 - 19)  SpO2: 99% (2022 05:40) (95% - 99%)    Parameters below as of 2022 05:40  Patient On (Oxygen Delivery Method): room air      REVIEW OF SYSTEMS:   All other review of systems is negative unless indicated above.    PHYSICAL EXAM:  General: Well developed; well nourished; in no acute distress  Eyes: PERRLA, EOMI; conjunctiva and sclera clear  Head: Normocephalic; atraumatic  ENMT: No nasal discharge; airway clear  Neck: Supple; non tender; no masses  Respiratory: No wheezes, rales or rhonchi  Cardiovascular: Regular rate and rhythm. S1 and S2 Normal; No murmurs, gallops or rubs  Gastrointestinal: Soft non-tender non-distended; Normal bowel sounds  Genitourinary: No  suprapubic  tenderness  Extremities: Normal range of motion, No clubbing, cyanosis or edema  Vascular: Peripheral pulses palpable 2+ bilaterally  Neurological: Alert and oriented x4  Skin: Warm and dry. No acute rash  Lymph Nodes: No acute cervical adenopathy  Musculoskeletal: Normal muscle tone, without deformities  Psychiatric: Cooperative and appropriate      LABS:                           13.0   5.88  )-----------( 135      ( 2022 06:08 )             39.8         140  |  112<H>  |  12  ----------------------------<  106<H>  4.2   |  25  |  0.70    Ca    8.6      2022 06:08    TPro  x   /  Alb  x   /  TBili  0.5  /  DBili  x   /  AST  x   /  ALT  x   /  AlkPhos  x   -    SARS-CoV-2: NotDetec (2022 01:41)    CAPILLARY BLOOD GLUCOSE          Culture - Urine (collected 2022 03:23)  Source: Clean Catch None  Final Report (2022 08:50):    >100,000 CFU/ml Escherichia coli  Organism: Escherichia coli (2022 08:50)  Organism: Escherichia coli (2022 08:50)    Culture - Blood (collected 2022 02:09)  Source: .Blood None  Gram Stain (2022 08:28):    Growth in aerobic bottle: Gram Negative Rods    Growth in anaerobic bottle: Gram Negative Rods  Final Report (2022 15:58):    Growth in aerobic and anaerobic bottles: Escherichia coli    See previous culture [id number]    Culture - Blood (collected 2022 02:09)  Source: .Blood None  Gram Stain (2022 06:12):    Growth in aerobic and anaerobic bottles: Gram Negative Rods  Final Report (2022 15:58):    Growth in aerobic and anaerobic bottles: Escherichia coli    ***Blood Panel PCR results on this specimen are available    approximately 3 hours after the Gram stain result.***    Gram stain, PCR, and/or culture results may not always    correspond due to difference in methodologies.    ************************************************************    This PCR assay was performed by multiplex PCR. This    Assay tests for 66 bacterial and resistance gene targets.    Please refer to the [facility] test directory    at [email or web address] for details.  Organism: Blood Culture PCR  Escherichia coli (2022 15:58)  Organism: Escherichia coli (2022 15:58)  Organism: Blood Culture PCR (2022 15:58)                            13.9   8.11  )-----------( 132      ( 2022 05:41 )             42.9     2022 05:41    141    |  111    |  24     ----------------------------<  107    4.6     |  25     |  0.80     Ca    9.2        2022 05:41  Mg     1.7       2022 06:51    TPro  6.9    /  Alb  3.7    /  TBili  0.6    /  DBili  x      /  AST  25     /  ALT  23     /  AlkPhos  89     2022 01:41  PT/INR - ( 2022 01:41 )   PT: 12.8 sec;   INR: 1.10 ratio    PTT - ( 2022 01:41 )  PTT:27.9 sec          LIVER FUNCTIONS - ( 2022 01:41 )  Alb: 3.7 g/dL / Pro: 6.9 gm/dL / ALK PHOS: 89 U/L / ALT: 23 U/L / AST: 25 U/L / GGT: x           Urinalysis Basic - ( 2022 03:23 )    Color: Yellow / Appearance: Clear / S.015 / pH: x  Gluc: x / Ketone: Trace  / Bili: Negative / Urobili: Negative   Blood: x / Protein: 30 mg/dL / Nitrite: Positive   Leuk Esterase: Moderate / RBC: 11-25 /HPF / WBC >50   Sq Epi: x / Non Sq Epi: Occasional / Bacteria: Occasional    Culture - Blood (22 @ 02:09)   Gram Stain:   Growth in aerobic bottle: Gram Negative Rods   Growth in anaerobic bottle: Gram Negative Rods   Specimen Source: .Blood None   Culture Results:   Growth in aerobic bottle: Gram Negative Rods   Growth in anaerobic bottle: Gram Negative Rods     MEDICATIONS  (STANDING):  cefTRIAXone Injectable. 2000 milliGRAM(s) IV Push every 24 hours  citalopram 40 milliGRAM(s) Oral daily  enoxaparin Injectable 40 milliGRAM(s) SubCutaneous every 24 hours  fenofibrate Tablet 145 milliGRAM(s) Oral daily  lactobacillus acidophilus 1 Tablet(s) Oral two times a day with meals  pantoprazole    Tablet 40 milliGRAM(s) Oral before breakfast  sodium chloride 0.9%. 1000 milliLiter(s) (100 mL/Hr) IV Continuous <Continuous>    MEDICATIONS  (PRN):  acetaminophen     Tablet .. 650 milliGRAM(s) Oral every 6 hours PRN Temp greater or equal to 38C (100.4F), Mild Pain (1 - 3)  ALPRAZolam 0.5 milliGRAM(s) Oral daily PRN anxiety  aluminum hydroxide/magnesium hydroxide/simethicone Suspension 30 milliLiter(s) Oral every 4 hours PRN Dyspepsia  melatonin 3 milliGRAM(s) Oral at bedtime PRN Insomnia  ondansetron Injectable 4 milliGRAM(s) IV Push every 8 hours PRN Nausea and/or Vomiting  polyethylene glycol 3350 17 Gram(s) Oral daily PRN Constipation        RADIOLOGY & ADDITIONAL TESTS:  < from: US Kidney and Bladder (22 @ 14:56) >  IMPRESSION:  No hydronephrosis.    Limited evaluation of the urinary bladder due to underdistention; patient   just voided prior to exam    < end of copied text >

## 2022-11-30 LAB
ANION GAP SERPL CALC-SCNC: 6 MMOL/L — SIGNIFICANT CHANGE UP (ref 5–17)
BUN SERPL-MCNC: 17 MG/DL — SIGNIFICANT CHANGE UP (ref 7–23)
CALCIUM SERPL-MCNC: 9.9 MG/DL — SIGNIFICANT CHANGE UP (ref 8.5–10.1)
CHLORIDE SERPL-SCNC: 110 MMOL/L — HIGH (ref 96–108)
CO2 SERPL-SCNC: 26 MMOL/L — SIGNIFICANT CHANGE UP (ref 22–31)
CREAT SERPL-MCNC: 0.82 MG/DL — SIGNIFICANT CHANGE UP (ref 0.5–1.3)
EGFR: 77 ML/MIN/1.73M2 — SIGNIFICANT CHANGE UP
GLUCOSE SERPL-MCNC: 117 MG/DL — HIGH (ref 70–99)
MAGNESIUM SERPL-MCNC: 2.1 MG/DL — SIGNIFICANT CHANGE UP (ref 1.6–2.6)
PHOSPHATE SERPL-MCNC: 3.9 MG/DL — SIGNIFICANT CHANGE UP (ref 2.5–4.5)
POTASSIUM SERPL-MCNC: 3.7 MMOL/L — SIGNIFICANT CHANGE UP (ref 3.5–5.3)
POTASSIUM SERPL-SCNC: 3.7 MMOL/L — SIGNIFICANT CHANGE UP (ref 3.5–5.3)
SODIUM SERPL-SCNC: 142 MMOL/L — SIGNIFICANT CHANGE UP (ref 135–145)

## 2022-11-30 PROCEDURE — 99232 SBSQ HOSP IP/OBS MODERATE 35: CPT

## 2022-11-30 RX ADMIN — PANTOPRAZOLE SODIUM 40 MILLIGRAM(S): 20 TABLET, DELAYED RELEASE ORAL at 05:58

## 2022-11-30 RX ADMIN — ENOXAPARIN SODIUM 40 MILLIGRAM(S): 100 INJECTION SUBCUTANEOUS at 08:54

## 2022-11-30 RX ADMIN — CITALOPRAM 40 MILLIGRAM(S): 10 TABLET, FILM COATED ORAL at 08:53

## 2022-11-30 RX ADMIN — Medication 1 TABLET(S): at 08:54

## 2022-11-30 RX ADMIN — POLYETHYLENE GLYCOL 3350 17 GRAM(S): 17 POWDER, FOR SOLUTION ORAL at 08:54

## 2022-11-30 RX ADMIN — Medication 0.5 MILLIGRAM(S): at 22:49

## 2022-11-30 RX ADMIN — Medication 145 MILLIGRAM(S): at 08:54

## 2022-11-30 RX ADMIN — Medication 1 TABLET(S): at 17:39

## 2022-11-30 RX ADMIN — Medication 650 MILLIGRAM(S): at 22:49

## 2022-11-30 RX ADMIN — CEFTRIAXONE 2000 MILLIGRAM(S): 500 INJECTION, POWDER, FOR SOLUTION INTRAMUSCULAR; INTRAVENOUS at 08:54

## 2022-11-30 NOTE — PROGRESS NOTE ADULT - SUBJECTIVE AND OBJECTIVE BOX
Date of service: 22 @ 08:51    Lying in bed in NAD  Has urinary frequency  No fever  No further chills    ROS: denies dizziness, no HA, no SOB or cough, no abdominal pain, no diarrhea or constipation; no legs pain, no rashes    MEDICATIONS  (STANDING):  cefTRIAXone Injectable. 2000 milliGRAM(s) IV Push every 24 hours  citalopram 40 milliGRAM(s) Oral daily  enoxaparin Injectable 40 milliGRAM(s) SubCutaneous every 24 hours  fenofibrate Tablet 145 milliGRAM(s) Oral daily  lactobacillus acidophilus 1 Tablet(s) Oral two times a day with meals  pantoprazole    Tablet 40 milliGRAM(s) Oral before breakfast    Vital Signs Last 24 Hrs  T(C): 36.6 (2022 05:00), Max: 36.6 (2022 15:13)  T(F): 97.9 (2022 05:00), Max: 97.9 (2022 05:00)  HR: 66 (2022 05:00) (66 - 72)  BP: 122/84 (2022 05:00) (113/74 - 122/84)  BP(mean): --  RR: 18 (2022 05:00) (18 - 18)  SpO2: 95% (2022 05:00) (95% - 98%)    Parameters below as of 2022 05:00  Patient On (Oxygen Delivery Method): room air     Physical exam:    Constitutional:  No acute distress  HEENT: NC/AT, EOMI, PERRLA, conjunctivae clear; ears and nose atraumatic  Neck: supple; thyroid not palpable  Back: no tenderness  Respiratory: respiratory effort normal; clear to auscultation  Cardiovascular: S1S2 regular, no murmurs  Abdomen: soft, not tender, not distended, positive BS  Genitourinary: has suprapubic tenderness  Lymphatic: no LN palpable  Musculoskeletal: no muscle tenderness, no joint swelling or tenderness  Extremities: no pedal edema  Neurological/ Psychiatric: AxOx3, moving all extremities  Skin: no rashes; no palpable lesions    Labs: reviewed                        13.0   5.88  )-----------( 135      ( 2022 06:08 )             39.8     11-30    142  |  110<H>  |  17  ----------------------------<  117<H>  3.7   |  26  |  0.82    Ca    9.9      2022 05:47  Phos  3.9       Mg     2.1         TPro  x   /  Alb  x   /  TBili  0.5  /  DBili  x   /  AST  x   /  ALT  x   /  AlkPhos  x                           13.9   8.11  )-----------( 132      ( 2022 05:41 )             42.9         141  |  111<H>  |  24<H>  ----------------------------<  107<H>  4.6   |  25  |  0.80    Ca    9.2      2022 05:41  Mg     1.7         TPro  6.9  /  Alb  3.7  /  TBili  0.6  /  DBili  x   /  AST  25  /  ALT  23  /  AlkPhos  89       LIVER FUNCTIONS - ( 2022 01:41 )  Alb: 3.7 g/dL / Pro: 6.9 gm/dL / ALK PHOS: 89 U/L / ALT: 23 U/L / AST: 25 U/L / GGT: x           Urinalysis Basic - ( 2022 03:23 )    Color: Yellow / Appearance: Clear / S.015 / pH: x  Gluc: x / Ketone: Trace  / Bili: Negative / Urobili: Negative   Blood: x / Protein: 30 mg/dL / Nitrite: Positive   Leuk Esterase: Moderate / RBC: 11-25 /HPF / WBC >50   Sq Epi: x / Non Sq Epi: Occasional / Bacteria: Occasional    ( @ 01:41)  NotDete      Culture - Urine (collected 2022 03:23)  Source: Clean Catch None  Preliminary Report (2022 10:31):    >100,000 CFU/ml Escherichia coli    Culture - Blood (collected 2022 02:09)  Source: .Blood None  Gram Stain (2022 08:28):    Growth in aerobic bottle: Gram Negative Rods    Growth in anaerobic bottle: Gram Negative Rods  Preliminary Report (2022 08:28):    Growth in aerobic bottle: Gram Negative Rods    Growth in anaerobic bottle: Gram Negative Rods    Culture - Blood (collected 2022 02:09)  Source: .Blood None  Gram Stain (2022 06:12):    Growth in aerobic and anaerobic bottles: Gram Negative Rods  Preliminary Report (2022 06:12):    Growth in aerobic and anaerobic bottles: Gram Negative Rods  Organism: Blood Culture PCR (2022 09:20)      -  Escherichia coli: Detec      Method Type: PCR    Radiology: all available radiological tests reviewed        Advanced directives addressed: full resuscitation

## 2022-11-30 NOTE — PROGRESS NOTE ADULT - SUBJECTIVE AND OBJECTIVE BOX
CC: Chills (2022 06:20)    HPI: 66 y/o F with PMH anxiety and depression, osteopenia, osteoarthritis, urinary incontinence, ÁNGEL not on CPAP, RBBB, GERD, right ovarian cyst, s/p b/l knee replacements, s/p uvulopalatopharyngoplasty and tonsillectomy, right shoulder replacement, s/p MARIETTA presented with chills. Pt states that she went to bed around 11 pm and 1 hour later began having shaking/chills. She reports nausea and 1 episode of vomiting at home. She called 911 and came to the hospital where she had another episode of vomiting. Had a cold 3 weeks ago which has since subsided. She was having burning on urination and foul smelling urine on 22 and called her PCP who prescribed Macrobid She finished the course to completion. Denies fevers, chest pain, SOB, abdominal pain, diarrhea/constipation, back pain, blood in the urine.     ER course: Tmax 101.8F, , SpO2 94% on room air. Labs: WBC 3.41, neutrophils 78%, metamyelocytes 1.0%, lactate 3.8, K+ 3.4, carbon dioxide 20. UA: positive nitrite, moderate leukocyte esterase, moderate blood, WBC > 50, RBC 11-25, occasional bacteria. COVID and RVP negative.     EKG: pending, f/u result     Imaging:   - CXR: right shoulder replacement, no consolidation, no effusion, no pneumothorax (personally reviewed).    Pt was given Ceftriaxone, 1750 ml of NS, tylenol, zofran. She is being admitted to med/surg for further management.      INTERVAL HPI/ OVERNIGHT EVENTS/Subjectiv    : Feeling well. More energy. No more rigors and chills. Urine clear and voiding asymptomaticall  : Afebrile. Denies fever, chills, chest pain, SOB, vomiting, abdominal pain/discomfort. Night sweats reported? Will continue to monitor. Repeat blood cultures ordered     Review of Systems: 14 Point review of systems reviewed and reported as negative unless otherwise stated above    Vital Signs Last 24 Hrs  T(C): 36.4 (22 @ 09:11), Max: 36.6 (22 @ 15:13)  HR: 49 (22 @ 09:11) (49 - 72)  BP: 125/69 (22 @ 09:11) (113/74 - 125/69)  RR: 18 (22 @ 09:11) (18 - 18)  SpO2: 97% (22 @ 09:11) (95% - 98%)  Patient On (Oxygen Delivery Method): room air    PHYSICAL EXAM:  General: Well developed; well nourished; in no acute distress  Eyes: EOMI; conjunctiva and sclera clear  Head: Normocephalic; atraumatic  ENMT: ; airway clear; moist mucous membrances  Neck: Supple; No JVD  Respiratory: No wheezes, rales or rhonchi; normal respiratory effort  Cardiovascular: Regular rate and rhythm. S1 and S2 Normal; No murmurs, gallops or rubs  Gastrointestinal: Soft non-tender non-distended; Normal bowel sounds  Genitourinary: No  suprapubic  tenderness  Extremities: Normal range of motion, No clubbing, cyanosis or edema  Vascular: Peripheral pulses palpable 2+ bilaterally  Neurological: Alert and oriented x4  Skin: Warm and dry. No acute rash  Musculoskeletal: Normal muscle tone, without deformities  Psychiatric: Cooperative and appropriate      LABS:                           13.0   5.88  )-----------( 135      ( 2022 06:08 )             39.8         142  |  110<H>  |  17  ----------------------------<  117<H>  3.7   |  26  |  0.82    Ca    9.9      2022 05:47  Phos  3.9       Mg     2.1         TPro  x   /  Alb  x   /  TBili  0.5  /  DBili  x   /  AST  x   /  ALT  x   /  AlkPhos  x       SARS-CoV-2: NotDetec (2022 01:41)    CAPILLARY BLOOD GLUCOSE                                13.0   5.88  )-----------( 135      ( 2022 06:08 )             39.8         140  |  112<H>  |  12  ----------------------------<  106<H>  4.2   |  25  |  0.70    Ca    8.6      2022 06:08    TPro  x   /  Alb  x   /  TBili  0.5  /  DBili  x   /  AST  x   /  ALT  x   /  AlkPhos  x       SARS-CoV-2: NotDetec (2022 01:41)    CAPILLARY BLOOD GLUCOSE          Culture - Urine (collected 2022 03:23)  Source: Clean Catch None  Final Report (2022 08:50):    >100,000 CFU/ml Escherichia coli  Organism: Escherichia coli (2022 08:50)  Organism: Escherichia coli (2022 08:50)    Culture - Blood (collected 2022 02:09)  Source: .Blood None  Gram Stain (2022 08:28):    Growth in aerobic bottle: Gram Negative Rods    Growth in anaerobic bottle: Gram Negative Rods  Final Report (2022 15:58):    Growth in aerobic and anaerobic bottles: Escherichia coli    See previous culture [id number]    Culture - Blood (collected 2022 02:09)  Source: .Blood None  Gram Stain (2022 06:12):    Growth in aerobic and anaerobic bottles: Gram Negative Rods  Final Report (2022 15:58):    Growth in aerobic and anaerobic bottles: Escherichia coli    ***Blood Panel PCR results on this specimen are available    approximately 3 hours after the Gram stain result.***    Gram stain, PCR, and/or culture results may not always    correspond due to difference in methodologies.    ************************************************************    This PCR assay was performed by multiplex PCR. This    Assay tests for 66 bacterial and resistance gene targets.    Please refer to the [facility] test directory    at [email or web address] for details.  Organism: Blood Culture PCR  Escherichia coli (2022 15:58)  Organism: Escherichia coli (2022 15:58)  Organism: Blood Culture PCR (2022 15:58)        LIVER FUNCTIONS - ( 2022 01:41 )  Alb: 3.7 g/dL / Pro: 6.9 gm/dL / ALK PHOS: 89 U/L / ALT: 23 U/L / AST: 25 U/L / GGT: x           Urinalysis Basic - ( 2022 03:23 )    Color: Yellow / Appearance: Clear / S.015 / pH: x  Gluc: x / Ketone: Trace  / Bili: Negative / Urobili: Negative   Blood: x / Protein: 30 mg/dL / Nitrite: Positive   Leuk Esterase: Moderate / RBC: 11-25 /HPF / WBC >50   Sq Epi: x / Non Sq Epi: Occasional / Bacteria: Occasional    Culture - Blood (22 @ 02:09)   Gram Stain:   Growth in aerobic bottle: Gram Negative Rods   Growth in anaerobic bottle: Gram Negative Rods   Specimen Source: .Blood None   Culture Results:   Growth in aerobic bottle: Gram Negative Rods   Growth in anaerobic bottle: Gram Negative Rods     MEDICATIONS  (STANDING):  cefTRIAXone Injectable. 2000 milliGRAM(s) IV Push every 24 hours  citalopram 40 milliGRAM(s) Oral daily  enoxaparin Injectable 40 milliGRAM(s) SubCutaneous every 24 hours  fenofibrate Tablet 145 milliGRAM(s) Oral daily  lactobacillus acidophilus 1 Tablet(s) Oral two times a day with meals  pantoprazole    Tablet 40 milliGRAM(s) Oral before breakfast  sodium chloride 0.9%. 1000 milliLiter(s) (100 mL/Hr) IV Continuous <Continuous>    MEDICATIONS  (PRN):  acetaminophen     Tablet .. 650 milliGRAM(s) Oral every 6 hours PRN Temp greater or equal to 38C (100.4F), Mild Pain (1 - 3)  ALPRAZolam 0.5 milliGRAM(s) Oral daily PRN anxiety  aluminum hydroxide/magnesium hydroxide/simethicone Suspension 30 milliLiter(s) Oral every 4 hours PRN Dyspepsia  melatonin 3 milliGRAM(s) Oral at bedtime PRN Insomnia  ondansetron Injectable 4 milliGRAM(s) IV Push every 8 hours PRN Nausea and/or Vomiting  polyethylene glycol 3350 17 Gram(s) Oral daily PRN Constipation        RADIOLOGY & ADDITIONAL TESTS:  < from: US Kidney and Bladder (22 @ 14:56) >  IMPRESSION:  No hydronephrosis.    Limited evaluation of the urinary bladder due to underdistention; patient   just voided prior to exam    < end of copied text >

## 2022-11-30 NOTE — PROGRESS NOTE ADULT - ASSESSMENT
68 y/o Female with h/o anxiety and depression, osteopenia, osteoarthritis, urinary incontinence, ÁNGEL not on CPAP, RBBB, GERD, right ovarian cyst, b/l knee replacements, uvulopalatopharyngoplasty and tonsillectomy, right shoulder replacement, MARIETTA was admitted on 1/27 for fever and chills. Pt states that she went to bed around 11 pm on the day PTA and 1 hour later began having shaking/chills. She reports nausea and 1 episode of vomiting at home. She called 911 and came to the hospital where she had another episode of vomiting. Had a cold 3 weeks ago which has since subsided. She was having burning on urination and foul smelling urine on 11/11/22 and called her PCP who prescribed Macrobid. She finished the course to completion. In Er she was noted febrile to 101.8F and received ceftriaxone.     1. Sepsis with E.coli. UTI with E.coli.  -febrile syndrome improving  -leukocytosis improving  -concerned about more resistant organisms since the patient failed recent outpatient oral abx therapy  -BC x 2, urine c/s noted  -on ceftriaxone 2 gm IV qd # 3  -tolerating abx well so far; no side effects noted  -repeat BC x 2 collected  -continue abx coverage   -monitor temps  -f/u CBC  -supportive care  2. Other issues:   -care per medicine    d/w medical team

## 2022-11-30 NOTE — PROGRESS NOTE ADULT - ASSESSMENT
MEDICATIONS  (STANDING):  cefTRIAXone Injectable. 2000 milliGRAM(s) IV Push every 24 hours  citalopram 40 milliGRAM(s) Oral daily  enoxaparin Injectable 40 milliGRAM(s) SubCutaneous every 24 hours  fenofibrate Tablet 145 milliGRAM(s) Oral daily  lactobacillus acidophilus 1 Tablet(s) Oral two times a day with meals  pantoprazole    Tablet 40 milliGRAM(s) Oral before breakfast    MEDICATIONS  (PRN):  acetaminophen     Tablet .. 650 milliGRAM(s) Oral every 6 hours PRN Temp greater or equal to 38C (100.4F), Mild Pain (1 - 3)  ALPRAZolam 0.5 milliGRAM(s) Oral daily PRN anxiety  aluminum hydroxide/magnesium hydroxide/simethicone Suspension 30 milliLiter(s) Oral every 4 hours PRN Dyspepsia  melatonin 3 milliGRAM(s) Oral at bedtime PRN Insomnia  ondansetron Injectable 4 milliGRAM(s) IV Push every 8 hours PRN Nausea and/or Vomiting  polyethylene glycol 3350 17 Gram(s) Oral daily PRN Constipation      66 y/o F presented with chills     #Sepsis due ECOLI Bacteremia secondary to UTI POA  - fevers resolved  - Urine and Blood cultures + ECOLI, f/u sensitivities   - C/w Ceftriaxone, dose adjusted   - IVF as needed for fluid balance  - Trend WBC, monitor for temperatures   - Tylenol for temperatures PRN   - Monitor   closely       # Hypokalemia . resolved  - replaced  - monitor     # Nausea/Vomiting, due to sepsis/UTI/bacteremia. Resolved  - improved   - S/p IVF  - Encourage oral intake  - Zofran PRN       #Anxiety/depression  - C/w Citalopram, Xanax PRN    #DVT ppx: Lovenox 40 mg subcutaneous daily     Disposition; Repeat blood cultures ordered 11/30. Continue ABX. ID recs. Tentative discharge in 1-2 days if repeat blood cultures negative.   Code status: Full code (pt agrees to chest compressions and intubation if required).   Emergency contact: Jarad Curry (son) [phone]

## 2022-12-01 ENCOUNTER — TRANSCRIPTION ENCOUNTER (OUTPATIENT)
Age: 67
End: 2022-12-01

## 2022-12-01 VITALS
TEMPERATURE: 98 F | RESPIRATION RATE: 16 BRPM | HEART RATE: 77 BPM | DIASTOLIC BLOOD PRESSURE: 79 MMHG | SYSTOLIC BLOOD PRESSURE: 117 MMHG | OXYGEN SATURATION: 98 %

## 2022-12-01 PROCEDURE — 99239 HOSP IP/OBS DSCHRG MGMT >30: CPT

## 2022-12-01 RX ORDER — NITROFURANTOIN MACROCRYSTAL 50 MG
1 CAPSULE ORAL
Qty: 0 | Refills: 0 | DISCHARGE

## 2022-12-01 RX ORDER — CEFUROXIME AXETIL 250 MG
1 TABLET ORAL
Qty: 20 | Refills: 0
Start: 2022-12-01 | End: 2022-12-10

## 2022-12-01 RX ORDER — LACTOBACILLUS ACIDOPHILUS 100MM CELL
1 CAPSULE ORAL
Qty: 28 | Refills: 0
Start: 2022-12-01 | End: 2022-12-14

## 2022-12-01 RX ADMIN — Medication 1 TABLET(S): at 10:15

## 2022-12-01 RX ADMIN — CEFTRIAXONE 2000 MILLIGRAM(S): 500 INJECTION, POWDER, FOR SOLUTION INTRAMUSCULAR; INTRAVENOUS at 10:16

## 2022-12-01 RX ADMIN — CITALOPRAM 40 MILLIGRAM(S): 10 TABLET, FILM COATED ORAL at 10:14

## 2022-12-01 RX ADMIN — POLYETHYLENE GLYCOL 3350 17 GRAM(S): 17 POWDER, FOR SOLUTION ORAL at 10:19

## 2022-12-01 RX ADMIN — PANTOPRAZOLE SODIUM 40 MILLIGRAM(S): 20 TABLET, DELAYED RELEASE ORAL at 06:22

## 2022-12-01 RX ADMIN — Medication 145 MILLIGRAM(S): at 10:14

## 2022-12-01 RX ADMIN — ENOXAPARIN SODIUM 40 MILLIGRAM(S): 100 INJECTION SUBCUTANEOUS at 10:14

## 2022-12-01 NOTE — DISCHARGE NOTE NURSING/CASE MANAGEMENT/SOCIAL WORK - PATIENT PORTAL LINK FT
You can access the FollowMyHealth Patient Portal offered by [facility] by registering at the following website: [email or web address]. By joining [employer]’s FollowMyHealth portal, you will also be able to view your health information using other applications (apps) compatible with our system.

## 2022-12-01 NOTE — DISCHARGE NOTE NURSING/CASE MANAGEMENT/SOCIAL WORK - NSDCPEFALRISK_GEN_ALL_CORE
For information on Fall & Injury Prevention, visit: [email or web address] OR  [email or web address] OR  https://www.cdc.gov/steadi/patient.html

## 2022-12-01 NOTE — DISCHARGE NOTE NURSING/CASE MANAGEMENT/SOCIAL WORK - NSDCPEPTCAREGIVEDUMATLIST _GEN_ALL_CORE
ED Chest Pain HPI





- General


Chief Complaint: Chest Pain


Stated Complaint: CHEST PAIN


Time Seen by Provider: 10/15/19 17:37


Source: police, EMS, old records reviewed


Mode of arrival: Stretcher


Limitations: No Limitations





- History of Present Illness


Initial Comments: 





59-year-old female with past medical history of obesity, hypertension, diabetes,

breast cancer currently in remission, and previous DVT/PE since the hospital 

complaining of chest pain.  Patient had episode of chest pain 3 days ago that 

resolved so they recurred 2 hours prior to arrival.  Pain is at the sternal 

area, sharp, worse and movement and palpation with associated shortness of b

reath and nausea without vomiting.  Pain radiates to the back.  Patient also 

reports diaphoresis.  Both episodes of chest pain occurred at rest.  Patient 

denies calf tenderness, leg edema, or recent travel.  She has a history of 

previous PE/ DVT and is no longer on any anticoagulants.  She denies having a 

filter.  She's been compliant with her medications.  Contrary to previous 

medical record patient now denies ever receiving a stress test in the past.  

Positive family history of CAD.  Patient takes aspirin 81 mg daily.  Patient 

received additional aspirin and nitroglycerin in route via EMS reports it helped

to decrease her pain.  PMD: Dr. Piedra cardiologist: Dr heri murphy





- Related Data


                                Home Medications











 Medication  Instructions  Recorded  Confirmed  Last Taken


 


Carvedilol [Coreg] 25 mg PO BID 08/04/16 10/15/19 08/04/16 09:00


 


Citalopram [celeXA] 20 mg PO QDAY 08/04/16 10/15/19 08/04/16 09:00


 


Esomeprazole Magnesium [NexIUM] 20 mg PO DAILY 08/04/16 10/15/19 08/04/16 09:00


 


Furosemide [Lasix TAB] 40 mg PO QDAY 08/04/16 10/15/19 08/04/16 09:00


 


Lisinopril [Zestril] 40 mg PO DAILY 08/04/16 10/15/19 08/04/16 09:00


 


Metformin HCl [Glucophage] 500 mg PO DAILY 08/04/16 10/15/19 08/04/16 09:00


 


Potassium Chloride 10 meq PO QDAY 08/04/16 10/15/19 08/04/16 09:00


 


amLODIPine [Norvasc] 10 mg PO DAILY 08/04/16 10/15/19 08/04/16 09:00











                                    Allergies











Allergy/AdvReac Type Severity Reaction Status Date / Time


 


No Known Allergies Allergy   Unverified 08/04/16 12:46














Heart Score





- HEART Score


History: Moderately suspicious


EKG: Normal


Age: 45-65


Risk factors: > 3 risk factors or hx of atherosclerotic disease


Troponin: < normal limit


HEART Score: 4





ED Review of Systems


ROS: 


Stated complaint: CHEST PAIN


Other details as noted in HPI





Comment: All other systems reviewed and negative





ED Past Medical Hx





- Past Medical History


Hx Hypertension: Yes


Hx Congestive Heart Failure: Yes


Hx Diabetes: Yes


Hx Deep Vein Thrombosis: Yes (right lung 2014)


Additional medical history: chemotherapy 2012- left breast





- Surgical History


Hx Cholecystectomy: Yes (2015)


Hx Breast Surgery: Yes (left 2012)


Additional Surgical History: mastectomy Left side





- Social History


Smoking Status: Never Smoker


Substance Use Type: None





- Medications


Home Medications: 


                                Home Medications











 Medication  Instructions  Recorded  Confirmed  Last Taken  Type


 


Carvedilol [Coreg] 25 mg PO BID 08/04/16 10/15/19 08/04/16 09:00 History


 


Citalopram [celeXA] 20 mg PO QDAY 08/04/16 10/15/19 08/04/16 09:00 History


 


Esomeprazole Magnesium [NexIUM] 20 mg PO DAILY 08/04/16 10/15/19 08/04/16 09:00 

History


 


Furosemide [Lasix TAB] 40 mg PO QDAY 08/04/16 10/15/19 08/04/16 09:00 History


 


Lisinopril [Zestril] 40 mg PO DAILY 08/04/16 10/15/19 08/04/16 09:00 History


 


Metformin HCl [Glucophage] 500 mg PO DAILY 08/04/16 10/15/19 08/04/16 09:00 

History


 


Potassium Chloride 10 meq PO QDAY 08/04/16 10/15/19 08/04/16 09:00 History


 


amLODIPine [Norvasc] 10 mg PO DAILY 08/04/16 10/15/19 08/04/16 09:00 History














ED Physical Exam





- General


Limitations: No Limitations





- Other


Other exam information: 





Gen.: No acute distress


Head: Atraumatic


Eyes: Normal appearance


ENT: Moist mucous membranes


Neck: Normal appearance, no posterior midline tenderness, no meningismus


Chest: Clear to auscultation bilaterally, reproducible sternal chest wall 

tenderness


Cardiovascular: Regular rate and rhythm


Abdomen: Normal appearance, soft, nontender, no rebound or guarding, normal 

bowel sounds


Back: Normal appearance, nontender


Extremity: Full range of motion, normal appearance, no calf tenderness or leg 

edema


Neuro: Alert, clear speech, no focal motor or sensory deficit


Psychiatric: Appropriate


Skin: No rash





ED Course


                                   Vital Signs











  10/15/19 10/15/19 10/15/19





  15:01 17:26 17:28


 


Temperature   98.6 F


 


Pulse Rate  65 64


 


Respiratory  16 18





Rate   


 


Blood Pressure 127/67 127/67 105/59


 


O2 Sat by Pulse  96 96





Oximetry   














  10/15/19 10/15/19 10/15/19





  17:30 18:00 18:30


 


Temperature   


 


Pulse Rate 65  70


 


Respiratory 17  16





Rate   


 


Blood Pressure 105/59 105/59 91/59


 


O2 Sat by Pulse 98 97 99





Oximetry   














  10/15/19 10/15/19 10/15/19





  18:55 19:00 19:30


 


Temperature   


 


Pulse Rate 89 64 65


 


Respiratory 18 14 15





Rate   


 


Blood Pressure  91/55 91/59


 


O2 Sat by Pulse 98 99 99





Oximetry   














- Reevaluation(s)


Reevaluation #1: 





10/15/19 21:24


bp normal range


pt pain free (did not receive morphine)


ct reviewed, no pe


repeat trop and ekg ordered





KATIE score





- Katie Score


Age > 65: (0) No


Aspirin use within the Past 7 Days: (1) Yes


3 or more CAD Risk Factors: (1) Yes


2 or more Angina events in past 24 hrs: (0) No


Known CAD with more than 50% Stenosis: (0) No


Elevated Cardiac Markers: (0) No


ST Deviation Greater than 0.5mm: (0) No


KATIE Score: 2





ED Medical Decision Making





- Lab Data


Result diagrams: 


                                 10/15/19 17:39





                                 10/15/19 17:39








                                   Lab Results











  10/15/19 10/15/19 10/15/19 Range/Units





  17:39 17:39 17:39 


 


WBC  6.9    (4.5-11.0)  K/mm3


 


RBC  4.71    (3.65-5.03)  M/mm3


 


Hgb  12.5    (10.1-14.3)  gm/dl


 


Hct  37.9    (30.3-42.9)  %


 


MCV  81    (79-97)  fl


 


MCH  27 L    (28-32)  pg


 


MCHC  33    (30-34)  %


 


RDW  14.6    (13.2-15.2)  %


 


Plt Count  210    (140-440)  K/mm3


 


Lymph % (Auto)  41.8 H    (13.4-35.0)  %


 


Mono % (Auto)  7.2    (0.0-7.3)  %


 


Eos % (Auto)  3.4    (0.0-4.3)  %


 


Baso % (Auto)  0.7    (0.0-1.8)  %


 


Lymph #  2.9    (1.2-5.4)  K/mm3


 


Mono #  0.5    (0.0-0.8)  K/mm3


 


Eos #  0.2    (0.0-0.4)  K/mm3


 


Baso #  0.0    (0.0-0.1)  K/mm3


 


Seg Neutrophils %  46.9    (40.0-70.0)  %


 


Seg Neutrophils #  3.2    (1.8-7.7)  K/mm3


 


PT   13.3   (12.2-14.9)  Sec.


 


INR   1.04   (0.87-1.13)  


 


APTT   27.3   (24.2-36.6)  Sec.


 


Sodium    139  (137-145)  mmol/L


 


Potassium    4.1  (3.6-5.0)  mmol/L


 


Chloride    99.1  ()  mmol/L


 


Carbon Dioxide    26  (22-30)  mmol/L


 


Anion Gap    18  mmol/L


 


BUN    17  (7-17)  mg/dL


 


Creatinine    1.0  (0.7-1.2)  mg/dL


 


Estimated GFR    > 60  ml/min


 


BUN/Creatinine Ratio    17  %


 


Glucose    124 H  ()  mg/dL


 


Calcium    10.0  (8.4-10.2)  mg/dL


 


Total Bilirubin    0.20  (0.1-1.2)  mg/dL


 


AST    28  (5-40)  units/L


 


ALT    29  (7-56)  units/L


 


Alkaline Phosphatase    134 H  ()  units/L


 


Troponin T    < 0.010  (0.00-0.029)  ng/mL


 


Total Protein    8.9 H  (6.3-8.2)  g/dL


 


Albumin    4.3  (3.9-5)  g/dL


 


Albumin/Globulin Ratio    0.9  %


 


Urine Color     (Yellow)  


 


Urine Turbidity     (Clear)  


 


Urine pH     (5.0-7.0)  


 


Ur Specific Gravity     (1.003-1.030)  


 


Urine Protein     (Negative)  mg/dL


 


Urine Glucose (UA)     (Negative)  mg/dL


 


Urine Ketones     (Negative)  mg/dL


 


Urine Blood     (Negative)  


 


Urine Nitrite     (Negative)  


 


Urine Bilirubin     (Negative)  


 


Urine Urobilinogen     (<2.0)  mg/dL


 


Ur Leukocyte Esterase     (Negative)  


 


Urine WBC (Auto)     (0.0-6.0)  /HPF


 


Urine RBC (Auto)     (0.0-6.0)  /HPF


 


U Epithel Cells (Auto)     (0-13.0)  /HPF


 


Urine Bacteria (Auto)     (Negative)  /HPF


 


Hyaline Casts     /LPF


 


Urine Mucus     /HPF














  10/15/19 Range/Units





  Unknown 


 


WBC   (4.5-11.0)  K/mm3


 


RBC   (3.65-5.03)  M/mm3


 


Hgb   (10.1-14.3)  gm/dl


 


Hct   (30.3-42.9)  %


 


MCV   (79-97)  fl


 


MCH   (28-32)  pg


 


MCHC   (30-34)  %


 


RDW   (13.2-15.2)  %


 


Plt Count   (140-440)  K/mm3


 


Lymph % (Auto)   (13.4-35.0)  %


 


Mono % (Auto)   (0.0-7.3)  %


 


Eos % (Auto)   (0.0-4.3)  %


 


Baso % (Auto)   (0.0-1.8)  %


 


Lymph #   (1.2-5.4)  K/mm3


 


Mono #   (0.0-0.8)  K/mm3


 


Eos #   (0.0-0.4)  K/mm3


 


Baso #   (0.0-0.1)  K/mm3


 


Seg Neutrophils %   (40.0-70.0)  %


 


Seg Neutrophils #   (1.8-7.7)  K/mm3


 


PT   (12.2-14.9)  Sec.


 


INR   (0.87-1.13)  


 


APTT   (24.2-36.6)  Sec.


 


Sodium   (137-145)  mmol/L


 


Potassium   (3.6-5.0)  mmol/L


 


Chloride   ()  mmol/L


 


Carbon Dioxide   (22-30)  mmol/L


 


Anion Gap   mmol/L


 


BUN   (7-17)  mg/dL


 


Creatinine   (0.7-1.2)  mg/dL


 


Estimated GFR   ml/min


 


BUN/Creatinine Ratio   %


 


Glucose   ()  mg/dL


 


Calcium   (8.4-10.2)  mg/dL


 


Total Bilirubin   (0.1-1.2)  mg/dL


 


AST   (5-40)  units/L


 


ALT   (7-56)  units/L


 


Alkaline Phosphatase   ()  units/L


 


Troponin T   (0.00-0.029)  ng/mL


 


Total Protein   (6.3-8.2)  g/dL


 


Albumin   (3.9-5)  g/dL


 


Albumin/Globulin Ratio   %


 


Urine Color  Yellow  (Yellow)  


 


Urine Turbidity  Clear  (Clear)  


 


Urine pH  5.0  (5.0-7.0)  


 


Ur Specific Gravity  1.009  (1.003-1.030)  


 


Urine Protein  <15 mg/dl  (Negative)  mg/dL


 


Urine Glucose (UA)  Neg  (Negative)  mg/dL


 


Urine Ketones  Neg  (Negative)  mg/dL


 


Urine Blood  Neg  (Negative)  


 


Urine Nitrite  Neg  (Negative)  


 


Urine Bilirubin  Neg  (Negative)  


 


Urine Urobilinogen  < 2.0  (<2.0)  mg/dL


 


Ur Leukocyte Esterase  Tr  (Negative)  


 


Urine WBC (Auto)  6.0  (0.0-6.0)  /HPF


 


Urine RBC (Auto)  1.0  (0.0-6.0)  /HPF


 


U Epithel Cells (Auto)  1.0  (0-13.0)  /HPF


 


Urine Bacteria (Auto)  1+  (Negative)  /HPF


 


Hyaline Casts  18  /LPF


 


Urine Mucus  Few  /HPF














- EKG Data


-: EKG Interpreted by Me


EKG shows normal: sinus rhythm, QRS complexes (qrs -37), ST-T waves (no stemi)


Rate: normal (65)





- EKG Data


When compared to previous EKG there are: no significant change





- Radiology Data


Radiology results: report reviewed





CHEST 2 VIEWS INDICATION / CLINICAL INFORMATION: Chest Pain. COMPARISON: 

8/4/2016 FINDINGS: SUPPORT DEVICES: None. HEART / MEDIASTINUM: No significant 

abnormality. LUNGS / PLEURA: No significant pulmonary or pleural abnormality. No

 pneumothorax. ADDITIONAL FINDINGS: There may be postoperative changes of the 

left breast. IMPRESSION: 1. No acute findings. 





- Medical Decision Making





pt with atypical components of chest pain and it appears to be reproducible 

however, patient has risk factors and does have some shortness of breath, 

diaphoresis, and nausea with her chest pain episode.  Patient states she has 

never had a stress test.  CTA negative for pulmonary embolus him.  Case 

discussed with hospitalist for admission and further cardiac workup. Pt received

 asa and ntg pta. Morphine further improved pain





- Differential Diagnosis


MI, costochondritis, PE, pneumothorax, atypical chest pain


Critical Care Time: No


Critical care attestation.: 


If time is entered above; I have spent that time in minutes in the direct care 

of this critically ill patient, excluding procedure time.








ED Disposition


Clinical Impression: 


 Chest pain, Diabetes, HTN (hypertension)





Disposition: DC-09 OP ADMIT IP TO THIS HOSP


Is pt being admited?: Yes


Does the pt Need Aspirin: Yes


Condition: Stable


Instructions:  Hypertension (ED)


Time of Disposition: 22:37 (Dr Simms/hosp) Influenza Vaccination

## 2022-12-01 NOTE — DISCHARGE NOTE PROVIDER - NSDCMRMEDTOKEN_GEN_ALL_CORE_FT
cefuroxime 500 mg oral tablet: 1 tab(s) orally every 12 hours   CeleXA 40 mg oral tablet: 1 tab(s) orally once a day  fenofibrate 160 mg oral tablet: 1 tab(s) orally once a day  lactobacillus acidophilus oral capsule: 1 cap(s) orally 2 times a day   MiraLax oral powder for reconstitution: 17 gram(s) orally once a day, As Needed -for constipation MDD:1  omeprazole 40 mg oral delayed release capsule: 1 cap(s) orally once a day  Xanax 0.5 mg oral tablet: 1 tab(s) orally once a day, As Needed DO NOT take this with Oxycodone EVER. Can stop breathing if you take both.

## 2022-12-01 NOTE — DISCHARGE NOTE PROVIDER - CARE PROVIDER_API CALL
Nestor Blankenship (MD)  Cardiovascular Disease; Internal Medicine; Nuclear Cardiology  [address] [address] [facility] [address]  [place] [address]  Phone: [phone]  Fax: [phone]  Established Patient  Follow Up Time: 1 week

## 2022-12-01 NOTE — DISCHARGE NOTE PROVIDER - NSDCCPCAREPLAN_GEN_ALL_CORE_FT
PRINCIPAL DISCHARGE DIAGNOSIS  Diagnosis: E coli bacteremia  Assessment and Plan of Treatment: E coli infection in the blood likely from UTI which had the same bacteria. Repeat blood cultures are negative. Complete course of oral antibiotics as prescribed and follow up with your primary medical doctor. Maintain adequate oral hydration      SECONDARY DISCHARGE DIAGNOSES  Diagnosis: Cyst of left kidney  Assessment and Plan of Treatment: No acute intervention or further evaluation required. Most likely insignificant Outpatient follow up adnd possible re-imaging with your primary medical doctor in several weeks.

## 2022-12-01 NOTE — PROGRESS NOTE ADULT - SUBJECTIVE AND OBJECTIVE BOX
Date of service: 22 @ 09:51    Lying in bed in NAD  No further fever  Dysuria improving    ROS: no fever or chills; denies dizziness, no HA, no SOB or cough, no abdominal pain, no diarrhea or constipation; no legs pain, no rashes    MEDICATIONS  (STANDING):  cefTRIAXone Injectable. 2000 milliGRAM(s) IV Push every 24 hours  citalopram 40 milliGRAM(s) Oral daily  enoxaparin Injectable 40 milliGRAM(s) SubCutaneous every 24 hours  fenofibrate Tablet 145 milliGRAM(s) Oral daily  lactobacillus acidophilus 1 Tablet(s) Oral two times a day with meals  pantoprazole    Tablet 40 milliGRAM(s) Oral before breakfast    Vital Signs Last 24 Hrs  T(C): 36.7 (01 Dec 2022 08:41), Max: 37.1 (2022 15:40)  T(F): 98.1 (01 Dec 2022 08:41), Max: 98.8 (2022 15:40)  HR: 77 (01 Dec 2022 08:41) (77 - 79)  BP: 117/79 (01 Dec 2022 08:41) (112/75 - 121/70)  BP(mean): --  RR: 16 (01 Dec 2022 08:41) (16 - 18)  SpO2: 98% (01 Dec 2022 08:41) (95% - 98%)    Parameters below as of 01 Dec 2022 08:41  Patient On (Oxygen Delivery Method): room air     Physical exam:    Constitutional:  No acute distress  HEENT: NC/AT, EOMI, PERRLA, conjunctivae clear; ears and nose atraumatic  Neck: supple; thyroid not palpable  Back: no tenderness  Respiratory: respiratory effort normal; clear to auscultation  Cardiovascular: S1S2 regular, no murmurs  Abdomen: soft, not tender, not distended, positive BS  Genitourinary: has suprapubic tenderness  Lymphatic: no LN palpable  Musculoskeletal: no muscle tenderness, no joint swelling or tenderness  Extremities: no pedal edema  Neurological/ Psychiatric: AxOx3, moving all extremities  Skin: no rashes; no palpable lesions    Labs: reviewed        142  |  110<H>  |  17  ----------------------------<  117<H>  3.7   |  26  |  0.82    Ca    9.9      2022 05:47  Phos  3.9     11-30  Mg     2.1     -                        13.0   5.88  )-----------( 135      ( 2022 06:08 )             39.8         142  |  110<H>  |  17  ----------------------------<  117<H>  3.7   |  26  |  0.82    Ca    9.9      2022 05:47  Phos  3.9     11-30  Mg     2.1         TPro  x   /  Alb  x   /  TBili  0.5  /  DBili  x   /  AST  x   /  ALT  x   /  AlkPhos  x                           13.9   8.11  )-----------( 132      ( 2022 05:41 )             42.9     11    141  |  111<H>  |  24<H>  ----------------------------<  107<H>  4.6   |  25  |  0.80    Ca    9.2      2022 05:41  Mg     1.7         TPro  6.9  /  Alb  3.7  /  TBili  0.6  /  DBili  x   /  AST  25  /  ALT  23  /  AlkPhos  89       LIVER FUNCTIONS - ( 2022 01:41 )  Alb: 3.7 g/dL / Pro: 6.9 gm/dL / ALK PHOS: 89 U/L / ALT: 23 U/L / AST: 25 U/L / GGT: x           Urinalysis Basic - ( 2022 03:23 )    Color: Yellow / Appearance: Clear / S.015 / pH: x  Gluc: x / Ketone: Trace  / Bili: Negative / Urobili: Negative   Blood: x / Protein: 30 mg/dL / Nitrite: Positive   Leuk Esterase: Moderate / RBC: 11-25 /HPF / WBC >50   Sq Epi: x / Non Sq Epi: Occasional / Bacteria: Occasional    ( @ 01:41)  [facility]      Culture - Urine (collected 2022 03:23)  Source: Clean Catch None  Final Report (2022 08:50):    >100,000 CFU/ml Escherichia coli  Organism: Escherichia coli (2022 08:50)  Organism: Escherichia coli (2022 08:50)      -  Amikacin: S <=16      -  Amoxicillin/Clavulanic Acid: S <=8/4      -  Ampicillin: R >16 These ampicillin results predict results for amoxicillin      -  Ampicillin/Sulbactam: S 8/4 Enterobacter, Klebsiella aerogenes, Citrobacter, and Serratia may develop resistance during prolonged therapy (3-4 days)      -  Aztreonam: S <=4      -  Cefazolin: S <=2 For uncomplicated UTI with K. pneumoniae, E. coli, or P. mirablis: GILMA <=16 is sensitive and GILMA >=32 is resistant. This also predicts results for oral agents cefaclor, cefdinir, cefpodoxime, cefprozil, cefuroxime axetil, cephalexin and locarbef for uncomplicated UTI. Note that some isolates may be susceptible to these agents while testing resistant to cefazolin.      -  Cefepime: S <=2      -  Cefoxitin: S <=8      -  Ceftriaxone: S <=1 Enterobacter, Klebsiella aerogenes, Citrobacter, and Serratia may develop resistance during prolonged therapy      -  Ciprofloxacin: S <=0.25      -  Ertapenem: S <=0.5      -  Gentamicin: S 4      -  Imipenem: S <=1      -  Levofloxacin: S <=0.5      -  Meropenem: S <=1      -  Nitrofurantoin: S <=32 Should not be used to treat pyelonephritis      -  Piperacillin/Tazobactam: S <=8      -  Tobramycin: S <=2      -  Trimethoprim/Sulfamethoxazole: S <=0.5/9.5      Method Type: GILMA    Culture - Blood (collected 2022 02:09)  Source: .Blood None  Gram Stain (2022 08:28):    Growth in aerobic bottle: Gram Negative Rods    Growth in anaerobic bottle: Gram Negative Rods  Final Report (2022 15:58):    Growth in aerobic and anaerobic bottles: Escherichia coli    See previous culture [id number]    Culture - Blood (collected 2022 02:09)  Source: .Blood None  Gram Stain (2022 06:12):    Growth in aerobic and anaerobic bottles: Gram Negative Rods  Final Report (2022 15:58):    Growth in aerobic and anaerobic bottles: Escherichia coli  Organism: Blood Culture PCR  Escherichia coli (2022 15:58)  Organism: Escherichia coli (2022 15:58)      -  Amikacin: S <=16      -  Ampicillin: R >16 These ampicillin results predict results for amoxicillin      -  Ampicillin/Sulbactam: S <=4/2 Enterobacter, Klebsiella aerogenes, Citrobacter, and Serratia may develop resistance during prolonged therapy (3-4 days)      -  Aztreonam: S <=4      -  Cefazolin: S <=2 Enterobacter, Klebsiella aerogenes, Citrobacter, and Serratia may develop resistance during prolonged therapy (3-4 days)      -  Cefepime: S <=2      -  Cefoxitin: S <=8      -  Ceftriaxone: S <=1 Enterobacter, Klebsiella aerogenes, Citrobacter, and Serratia may develop resistance during prolonged therapy      -  Ciprofloxacin: S <=0.25      -  Ertapenem: S <=0.5      -  Gentamicin: S <=2      -  Imipenem: S <=1      -  Levofloxacin: S <=0.5      -  Meropenem: S <=1      -  Piperacillin/Tazobactam: S <=8      -  Tobramycin: S <=2      -  Trimethoprim/Sulfamethoxazole: S <=0.5/9.5      Method Type: GILMA  Organism: Blood Culture PCR (2022 15:58)      -  Escherichia coli: Detec      Method Type: PCR    Radiology: all available radiological tests reviewed    < from: US Kidney and Bladder (11.29.22 @ 14:56) >  No hydronephrosis.  < end of copied text >      Advanced directives addressed: full resuscitation

## 2022-12-01 NOTE — DISCHARGE NOTE PROVIDER - HOSPITAL COURSE
FROM H&P:    "66 y/o F with PMH anxiety and depression, osteopenia, osteoarthritis, urinary incontinence, ÁNGEL not on CPAP, RBBB, GERD, right ovarian cyst, s/p b/l knee replacements, s/p uvulopalatopharyngoplasty and tonsillectomy, right shoulder replacement, s/p MARIETTA presented with chills. Pt states that she went to bed around 11 pm and 1 hour later began having shaking/chills. She reports nausea and 1 episode of vomiting at home. She called 911 and came to the hospital where she had another episode of vomiting. Had a cold 3 weeks ago which has since subsided. She was having burning on urination and foul smelling urine on 11/11/22 and called her PCP who prescribed Macrobid She finished the course to completion. Denies fevers, chest pain, SOB, abdominal pain, diarrhea/constipation, back pain, blood in the urine.     ER course: Tmax 101.8F, , SpO2 94% on room air. Labs: WBC 3.41, neutrophils 78%, metamyelocytes 1.0%, lactate 3.8, K+ 3.4, carbon dioxide 20. UA: positive nitrite, moderate leukocyte esterase, moderate blood, WBC > 50, RBC 11-25, occasional bacteria. COVID and RVP negative.     EKG: pending, f/u result     Imaging:   - CXR: right shoulder replacement, no consolidation, no effusion, no pneumothorax (personally reviewed).    Pt was given Ceftriaxone, 1750 ml of NS, tylenol, zofran. She is being admitted to med/surg for further management."    #Sepsis due ECOLI Bacteremia secondary to UTI POA  - fevers resolved  - Urine and Blood cultures + ECOLI, f/u sensitivities   - C/w Ceftriaxone, dose adjusted   - IVF as needed for fluid balance  - Trend WBC, monitor for temperatures   - Tylenol for temperatures PRN   - Monitor   closely       # Hypokalemia . resolved  - replaced  - monitor     # Nausea/Vomiting, due to sepsis/UTI/bacteremia. Resolved  - improved   - S/p IVF  - Encourage oral intake  - Zofran PRN       #Anxiety/depression  - C/w Citalopram, Xanax PRN    #DVT ppx: Lovenox 40 mg subcutaneous daily     Disposition; Repeat blood cultures ordered 11/30. Continue ABX. ID recs. Repeat blood cultures negative. Cleared by ID. Discharge home in stable condition to complete course of antibiotics and close outpatient follow up with PMD  T(C): 36.7 (12-01-22 @ 08:41), Max: 37.1 (11-30-22 @ 15:40)  HR: 77 (12-01-22 @ 08:41) (77 - 79)  BP: 117/79 (12-01-22 @ 08:41) (112/75 - 121/70)  RR: 16 (12-01-22 @ 08:41) (16 - 18)  SpO2: 98% (12-01-22 @ 08:41) (95% - 98%)  General: Well developed; well nourished; in no acute distress  Respiratory: No wheezes, rales or rhonchi; normal respiratory effort  Cardiovascular: Regular rate and rhythm. S1 and S2 Normal; No murmurs, gallops or rubs  Gastrointestinal: Soft non-tender non-distended; Normal bowel sounds  Genitourinary: No  suprapubic  tenderness  Discharge Management: 39 minutes  Date of Discharge/Service: 12/1/2022

## 2022-12-01 NOTE — PROGRESS NOTE ADULT - ASSESSMENT
68 y/o Female with h/o anxiety and depression, osteopenia, osteoarthritis, urinary incontinence, ÁNGEL not on CPAP, RBBB, GERD, right ovarian cyst, b/l knee replacements, uvulopalatopharyngoplasty and tonsillectomy, right shoulder replacement, MARIETTA was admitted on 1/27 for fever and chills. Pt states that she went to bed around 11 pm on the day PTA and 1 hour later began having shaking/chills. She reports nausea and 1 episode of vomiting at home. She called 911 and came to the hospital where she had another episode of vomiting. Had a cold 3 weeks ago which has since subsided. She was having burning on urination and foul smelling urine on 11/11/22 and called her PCP who prescribed Macrobid. She finished the course to completion. In Er she was noted febrile to 101.8F and received ceftriaxone.     1. Sepsis with E.coli resolving. UTI with E.coli.  -no fever  -leukocytosis improving  -concerned about more resistant organisms since the patient failed recent outpatient oral abx therapy  -BC x 2, urine c/s noted  -on ceftriaxone 2 gm IV qd # 4  -tolerating abx well so far; no side effects noted  -repeat BC x 2 collected  -continue abx coverage; if repeat blood cultures are negative, may change to ceftin 500 mg PO q12h for 10 more days  -monitor temps  -f/u CBC  -supportive care  2. Other issues:   -care per medicine    d/w medical team

## 2022-12-05 LAB
CULTURE RESULTS: SIGNIFICANT CHANGE UP
CULTURE RESULTS: SIGNIFICANT CHANGE UP
SPECIMEN SOURCE: SIGNIFICANT CHANGE UP
SPECIMEN SOURCE: SIGNIFICANT CHANGE UP

## 2022-12-06 DIAGNOSIS — R32 UNSPECIFIED URINARY INCONTINENCE: ICD-10-CM

## 2022-12-06 DIAGNOSIS — M85.811 OTHER SPECIFIED DISORDERS OF BONE DENSITY AND STRUCTURE, RIGHT SHOULDER: ICD-10-CM

## 2022-12-06 DIAGNOSIS — M19.011 PRIMARY OSTEOARTHRITIS, RIGHT SHOULDER: ICD-10-CM

## 2022-12-06 DIAGNOSIS — N28.1 CYST OF KIDNEY, ACQUIRED: ICD-10-CM

## 2022-12-06 DIAGNOSIS — E87.6 HYPOKALEMIA: ICD-10-CM

## 2022-12-06 DIAGNOSIS — E86.0 DEHYDRATION: ICD-10-CM

## 2022-12-06 DIAGNOSIS — F32.A DEPRESSION, UNSPECIFIED: ICD-10-CM

## 2022-12-06 DIAGNOSIS — K21.9 GASTRO-ESOPHAGEAL REFLUX DISEASE WITHOUT ESOPHAGITIS: ICD-10-CM

## 2022-12-06 DIAGNOSIS — G47.33 OBSTRUCTIVE SLEEP APNEA (ADULT) (PEDIATRIC): ICD-10-CM

## 2022-12-06 DIAGNOSIS — Z90.710 ACQUIRED ABSENCE OF BOTH CERVIX AND UTERUS: ICD-10-CM

## 2022-12-06 DIAGNOSIS — Z96.653 PRESENCE OF ARTIFICIAL KNEE JOINT, BILATERAL: ICD-10-CM

## 2022-12-06 DIAGNOSIS — I45.10 UNSPECIFIED RIGHT BUNDLE-BRANCH BLOCK: ICD-10-CM

## 2022-12-06 DIAGNOSIS — A41.51 SEPSIS DUE TO ESCHERICHIA COLI [E. COLI]: ICD-10-CM

## 2022-12-06 DIAGNOSIS — Z96.611 PRESENCE OF RIGHT ARTIFICIAL SHOULDER JOINT: ICD-10-CM

## 2022-12-06 DIAGNOSIS — N39.3 STRESS INCONTINENCE (FEMALE) (MALE): ICD-10-CM

## 2022-12-06 DIAGNOSIS — N39.0 URINARY TRACT INFECTION, SITE NOT SPECIFIED: ICD-10-CM

## 2022-12-06 DIAGNOSIS — F41.9 ANXIETY DISORDER, UNSPECIFIED: ICD-10-CM

## 2022-12-06 DIAGNOSIS — M17.0 BILATERAL PRIMARY OSTEOARTHRITIS OF KNEE: ICD-10-CM

## 2023-06-28 ENCOUNTER — APPOINTMENT (OUTPATIENT)
Dept: ORTHOPEDIC SURGERY | Facility: CLINIC | Age: 68
End: 2023-06-28

## 2023-06-29 ENCOUNTER — EMERGENCY (EMERGENCY)
Facility: HOSPITAL | Age: 68
LOS: 0 days | Discharge: HOME | End: 2023-06-29
Attending: EMERGENCY MEDICINE
Payer: MEDICARE

## 2023-06-29 VITALS
TEMPERATURE: 98 F | DIASTOLIC BLOOD PRESSURE: 92 MMHG | SYSTOLIC BLOOD PRESSURE: 136 MMHG | HEART RATE: 74 BPM | RESPIRATION RATE: 18 BRPM | OXYGEN SATURATION: 99 %

## 2023-06-29 VITALS — WEIGHT: 190.04 LBS | HEIGHT: 66 IN
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
## 2023-06-29 DIAGNOSIS — S09.90XA UNSPECIFIED INJURY OF HEAD, INITIAL ENCOUNTER: ICD-10-CM

## 2023-06-29 DIAGNOSIS — Z96.611 PRESENCE OF RIGHT ARTIFICIAL SHOULDER JOINT: ICD-10-CM

## 2023-06-29 DIAGNOSIS — M17.0 BILATERAL PRIMARY OSTEOARTHRITIS OF KNEE: ICD-10-CM

## 2023-06-29 DIAGNOSIS — R51.9 HEADACHE, UNSPECIFIED: ICD-10-CM

## 2023-06-29 DIAGNOSIS — I45.10 UNSPECIFIED RIGHT BUNDLE-BRANCH BLOCK: ICD-10-CM
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
## 2023-06-29 DIAGNOSIS — Z96.611 PRESENCE OF RIGHT ARTIFICIAL SHOULDER JOINT: Chronic | ICD-10-CM

## 2023-06-29 DIAGNOSIS — Z90.710 ACQUIRED ABSENCE OF BOTH CERVIX AND UTERUS: ICD-10-CM

## 2023-06-29 DIAGNOSIS — M85.80 OTHER SPECIFIED DISORDERS OF BONE DENSITY AND STRUCTURE, UNSPECIFIED SITE: ICD-10-CM

## 2023-06-29 DIAGNOSIS — G47.33 OBSTRUCTIVE SLEEP APNEA (ADULT) (PEDIATRIC): ICD-10-CM

## 2023-06-29 DIAGNOSIS — Z90.710 ACQUIRED ABSENCE OF BOTH CERVIX AND UTERUS: Chronic | ICD-10-CM

## 2023-06-29 DIAGNOSIS — Z96.651 PRESENCE OF RIGHT ARTIFICIAL KNEE JOINT: ICD-10-CM
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
## 2023-06-29 DIAGNOSIS — F41.8 OTHER SPECIFIED ANXIETY DISORDERS: ICD-10-CM

## 2023-06-29 DIAGNOSIS — M54.2 CERVICALGIA: ICD-10-CM

## 2023-06-29 DIAGNOSIS — Z96.659 PRESENCE OF UNSPECIFIED ARTIFICIAL KNEE JOINT: Chronic | ICD-10-CM

## 2023-06-29 DIAGNOSIS — Z98.890 OTHER SPECIFIED POSTPROCEDURAL STATES: Chronic | ICD-10-CM

## 2023-06-29 DIAGNOSIS — M19.011 PRIMARY OSTEOARTHRITIS, RIGHT SHOULDER: ICD-10-CM

## 2023-06-29 PROCEDURE — 76376 3D RENDER W/INTRP POSTPROCES: CPT

## 2023-06-29 PROCEDURE — 70450 CT HEAD/BRAIN W/O DYE: CPT | Mod: 26,MA

## 2023-06-29 PROCEDURE — 76376 3D RENDER W/INTRP POSTPROCES: CPT | Mod: 26

## 2023-06-29 PROCEDURE — 70486 CT MAXILLOFACIAL W/O DYE: CPT | Mod: 26,MA

## 2023-06-29 PROCEDURE — 99284 EMERGENCY DEPT VISIT MOD MDM: CPT | Mod: FS

## 2023-06-29 PROCEDURE — 70486 CT MAXILLOFACIAL W/O DYE: CPT | Mod: MA

## 2023-06-29 PROCEDURE — 72125 CT NECK SPINE W/O DYE: CPT | Mod: MA

## 2023-06-29 PROCEDURE — 99284 EMERGENCY DEPT VISIT MOD MDM: CPT | Mod: 25

## 2023-06-29 PROCEDURE — 72125 CT NECK SPINE W/O DYE: CPT | Mod: 26,MA

## 2023-06-29 PROCEDURE — 70450 CT HEAD/BRAIN W/O DYE: CPT | Mod: MA

## 2023-06-29 RX ORDER — ACETAMINOPHEN 500 MG
975 TABLET ORAL ONCE
Refills: 0 | Status: COMPLETED | OUTPATIENT
Start: 2023-06-29 | End: 2023-06-29

## 2023-06-29 RX ADMIN — Medication 975 MILLIGRAM(S): at 12:36

## 2023-06-29 NOTE — ED STATDOCS - NSFOLLOWUPINSTRUCTIONS_ED_ALL_ED_FT
ED evaluation and management discussed with the patient and family (if available) in detail.  Close PMD follow up encouraged.  Strict ED return instructions discussed in detail and patient given the opportunity to ask any questions about their discharge diagnosis and instructions. Patient verbalized understanding.    Head Injury, Adult    There are many types of head injuries. Head injuries can be as minor as a small bump, or they can be a serious medical issue. More severe head injuries include:  A jarring injury to the brain (concussion).  A bruise (contusion) of the brain. This means there is bleeding in the brain that can cause swelling.  A cracked skull (skull fracture).  Bleeding in the brain that collects, clots, and forms a bump (hematoma).  After a head injury, most problems occur within the first 24 hours, but side effects may occur up to 7–10 days after the injury. It is important to watch your condition for any changes. You may need to be observed in the emergency department or urgent care, or you may be admitted to the hospital.    What are the causes?  There are many possible causes of a head injury. Serious head injuries may be caused by car accidents, bicycle or motorcycle accidents, sports injuries, falls, or being struck by an object.    What are the symptoms?  Symptoms of a head injury include a contusion, bump, or bleeding at the site of the injury. Other physical symptoms may include:  Headache.  Nausea or vomiting.  Dizziness.  Blurred or double vision.  Being uncomfortable around bright lights or loud noises.  Seizures.  Feeling tired.  Trouble being awakened.  Loss of consciousness.  Mental or emotional symptoms may include:  Irritability.  Confusion and memory problems.  Poor attention and concentration.  Changes in eating or sleeping habits.  Anxiety or depression.  How is this diagnosed?  This condition can usually be diagnosed based on your symptoms, a description of the injury, and a physical exam. You may also have imaging tests done, such as a CT scan or an MRI.    How is this treated?  Treatment for this condition depends on the severity and type of injury you have. The main goal of treatment is to prevent complications and allow the brain time to heal.    Mild head injury    If you have a mild head injury, you may be sent home, and treatment may include:  Observation. A responsible adult should stay with you for 24 hours after your injury and check on you often.  Physical rest.  Brain rest.  Pain medicines.  Severe head injury    If you have a severe head injury, treatment may include:  Close observation. This includes hospitalization with the following care:  Frequent physical exams.  Frequent checks of how your brain and nervous system are working (neurological status).  Checking your blood pressure and oxygen levels.  Medicines to relieve pain, prevent seizures, and decrease brain swelling.  Airway protection and breathing support. This may include using a ventilator.  Treatments that monitor and manage swelling inside the brain.  Brain surgery. This may be needed to:  Remove a collection of blood or blood clots.  Stop the bleeding.  Remove a part of the skull to allow room for the brain to swell.  Follow these instructions at home:  Activity    Rest and avoid activities that are physically hard or tiring.  Make sure you get enough sleep.  Let your brain rest by limiting activities that require a lot of thought or attention, such as:  Watching TV.  Playing memory games and puzzles.  Job-related work or homework.  Working on the computer, using social media, and texting.  Avoid activities that could cause another head injury, such as playing sports, until your health care provider approves. Having another head injury, especially before the first one has healed, can be dangerous.  Ask your health care provider when it is safe for you to return to your regular activities, including work or school. Ask your health care provider for a step-by-step plan for gradually returning to activities.  Ask your health care provider when you can drive, ride a bicycle, or use heavy machinery. Your ability to react may be slower after a brain injury. Do not do these activities if you are dizzy.  Lifestyle      Do not drink alcohol until your health care provider approves. Do not use drugs. Alcohol and certain drugs may slow your recovery and can put you at risk of further injury.  If it is harder than usual to remember things, write them down.  If you are easily distracted, try to do one thing at a time.  Talk with family members or close friends when making important decisions.  Tell your friends, family, a trusted colleague, and  about your injury, symptoms, and restrictions. Have them watch for any new or worsening problems.  General instructions    Take over-the-counter and prescription medicines only as told by your health care provider.  Have someone stay with you for 24 hours after your head injury. This person should watch you for any changes in your symptoms and be ready to seek medical help.  Keep all follow-up visits as told by your health care provider. This is important.  How is this prevented?  Work on improving your balance and strength to avoid falls.  Wear a seat belt when you are in a moving vehicle.  Wear a helmet when riding a bicycle, skiing, or doing any other sport or activity that has a risk of injury.  If you drink alcohol:  Limit how much you use to:  0–1 drink a day for nonpregnant women.  0–2 drinks a day for men.  Be aware of how much alcohol is in your drink. In the [place] one drink equals one 12 oz bottle of beer (355 mL), one 5 oz glass of wine (148 mL), or one 1½ oz glass of hard liquor (44 mL).  Take safety measures in your home, such as:  Removing clutter and tripping hazards from floors and stairways.  Using grab bars in bathrooms and handrails by stairs.  Placing non-slip mats on floors and in bathtubs.  Improving lighting in dim areas.  Where to find more information  Centers for Disease Control and Prevention: www.cdc.gov  Get help right away if:  You have:  A severe headache that is not helped by medicine.  Trouble walking or weakness in your arms and legs.  Clear or bloody fluid coming from your nose or ears.  Changes in your vision.  A seizure.  Increased confusion or irritability.  Your symptoms get worse.  You are sleepier than normal and have trouble staying awake.  You lose your balance.  Your pupils change size.  Your speech is slurred.  Your dizziness gets worse.  You vomit.  These symptoms may represent a serious problem that is an emergency. Do not wait to see if the symptoms will go away. Get medical help right away. Call your local emergency services (911 in the [place] Do not drive yourself to the hospital.    Summary  Head injuries can be minor, or they can be a serious medical issue requiring immediate attention.  Treatment for this condition depends on the severity and type of injury you have.  Have someone stay with you for 24 hours after your injury and check on you often.  Ask your health care provider when it is safe for you to return to your regular activities, including work or school.  Head injury prevention includes wearing a seat belt in a motor vehicle, using a helmet on a bicycle, limiting alcohol use, and taking safety measures in your home.

## 2023-06-29 NOTE — ED STATDOCS - ATTENDING APP SHARED VISIT CONTRIBUTION OF CARE
I,Manuelito Moran MD,  performed the initial face to face bedside interview with this patient regarding history of present illness, review of symptoms and relevant past medical, social and family history.  I completed an independent physical examination.  I was the initial provider who evaluated this patient. I have signed out the follow up of any pending tests (i.e. labs, radiological studies) to the ACP.  I have communicated the patient’s plan of care and disposition with the ACP.  The history, relevant review of systems, past medical and surgical history, medical decision making, and physical examination was documented by the scribe in my presence and I attest to the accuracy of the documentation.

## 2023-06-29 NOTE — ED STATDOCS - NS ED ATTENDING STATEMENT MOD
This was a shared visit with the SWETA. I reviewed and verified the documentation and independently performed the documented:

## 2023-06-29 NOTE — ED STATDOCS - PATIENT PORTAL LINK FT
You can access the FollowMyHealth Patient Portal offered by [facility] by registering at the following website: [email or web address]. By joining [facility]’s FollowMyHealth portal, you will also be able to view your health information using other applications (apps) compatible with our system.

## 2023-06-29 NOTE — ED STATDOCS - OBJECTIVE STATEMENT
68 y/o female w/ PMHx of anxiety, depression, OA, osteopenia, hysterectomy, RBBB, ovarean cyst, sleep apnea presents to the ED c/o headache and neck pain s/p mechanical trip and fall at work today where pt fell forward and hit her head/nose, -LOC, -blood thinners. Denies nausea/vomiting. Did not take anything for pain PTA. Pt also endorses recent shingles on her face about a month ago.

## 2023-06-29 NOTE — ED ADULT NURSE NOTE - NSFALLUNIVINTERV_ED_ALL_ED
Bed/Stretcher in lowest position, wheels locked, appropriate side rails in place/Call bell, personal items and telephone in reach/Instruct patient to call for assistance before getting out of bed/chair/stretcher/Non-slip footwear applied when patient is off stretcher/[place] to call system/Physically safe environment - no spills, clutter or unnecessary equipment/Purposeful proactive rounding/Room/bathroom lighting operational, light cord in reach

## 2023-06-29 NOTE — ED ADULT TRIAGE NOTE - CHIEF COMPLAINT QUOTE
Pt presents to the ED s/p mechanical fall at work. Pt reports falling forward, hitting her face on the tile floor. Pt c/o headache. Denies LOC or blood thinner use. Dr. Moran notified, neuro alert called at 11:32. Pt taken to CT.

## 2023-06-29 NOTE — ED STATDOCS - CARE PLAN
1 Principal Discharge DX:	Accident due to mechanical fall without injury  Secondary Diagnosis:	Head injury

## 2023-06-29 NOTE — ED STATDOCS - CLINICAL SUMMARY MEDICAL DECISION MAKING FREE TEXT BOX
Trip and fall w/ head and facial injury, not on ACs, pt well appearing. Will get CT head/c spine to r/o ICH or fracture. Will d/c if results negative. Trip and fall w/ head and facial injury, not on ACs, pt well appearing. Will get CT head/c spine to r/o ICH or fracture. Will d/c if results negative.    12:57 CT reviewed, no acute traumatic injury. Discussed with patient. Discharge home with PCP follow up. Discussed red flag symptoms warranting return to the ED. Pt verbalized understanding and is in agreement with the plan of care.

## 2023-06-29 NOTE — ED STATDOCS - PROGRESS NOTE DETAILS
68 y/o female c/o headache, neck pain s/p mechanical trip and fall. Pt states she tripped outside, falling onto her elbows and face. Denies LOC. Was able to get up on her own. She reports mild headache and neck pain, was encouraged to seek ED eval. No AC use.     well appearing, NAD, no external injury or deformity   No facial tenderness, no trismus, no epistaxis, no septal hematoma  No midline cervical or spinal tenderness   No extremity tenderness. Full ROM all extremities  PERRLA, EOMI, CN II-XII grossly intact. 5/5 strength all ext with SILT   Ambulatory with steady gait     Plan for CT brain, reassess Pt reassessed, feeling better with tylenol.   CT brain, c-spine, facial bones reviewed, no traumatic injury

## 2023-06-29 NOTE — ED STATDOCS - ENMT, MLM
Nasal mucosa clear.  Mouth with normal mucosa  Throat has no vesicles, no oropharyngeal exudates and uvula is midline. Head: no scalp hematoma or laceration

## 2023-09-21 NOTE — ED ADULT NURSE NOTE - SUICIDE SCREENING QUESTION 3
Goal Outcome Evaluation:               VS and assessment at charted. Pt rates abdominal pain intmierttent cramping 3/10 and declines analgesia at this time. Is wearing abdominal binder. Trochanter sites x 3 WDL. Scant vaginal bleeding. Pt up ad letty. Pt declines to use scds, risks for clotting explained. Pt voiding. Will continue to monitor and plan for discontinue in am.           No

## 2024-01-14 NOTE — ED ADULT NURSE NOTE - PRIMARY CARE PROVIDER
DISCHARGE SCREENS:  ONBS: 11/1 Repeat in 1-2 weeks 12/15 TSH 5.23, FT4 1.04 - normal  ROP:   1/10: Stage 3 Zone 2, next 1/17.  Circumcision: ###  CSC (<37wks or Cardiac): ###  WIC Form: ###  PCP/Pediatrician: ###     see md note

## 2024-01-22 ENCOUNTER — APPOINTMENT (OUTPATIENT)
Dept: OBGYN | Facility: CLINIC | Age: 69
End: 2024-01-22
Payer: MEDICARE

## 2024-01-22 VITALS
HEIGHT: 65 IN | SYSTOLIC BLOOD PRESSURE: 122 MMHG | WEIGHT: 190 LBS | BODY MASS INDEX: 31.65 KG/M2 | DIASTOLIC BLOOD PRESSURE: 72 MMHG | RESPIRATION RATE: 16 BRPM

## 2024-01-22 DIAGNOSIS — N95.2 POSTMENOPAUSAL ATROPHIC VAGINITIS: ICD-10-CM

## 2024-01-22 DIAGNOSIS — M85.80 OTHER SPECIFIED DISORDERS OF BONE DENSITY AND STRUCTURE, UNSPECIFIED SITE: ICD-10-CM

## 2024-01-22 DIAGNOSIS — R32 UNSPECIFIED URINARY INCONTINENCE: ICD-10-CM

## 2024-01-22 PROCEDURE — 82270 OCCULT BLOOD FECES: CPT

## 2024-01-22 PROCEDURE — G0101: CPT

## 2024-01-22 RX ORDER — ESTRADIOL 0.1 MG/G
0.1 CREAM VAGINAL
Qty: 1 | Refills: 3 | Status: ACTIVE | COMMUNITY
Start: 2024-01-22 | End: 1900-01-01

## 2024-01-22 NOTE — PHYSICAL EXAM
[Appropriately responsive] : appropriately responsive [No Acute Distress] : no acute distress [Alert] : alert [No Lymphadenopathy] : no lymphadenopathy [Regular Rate Rhythm] : regular rate rhythm [No Murmurs] : no murmurs [Clear to Auscultation B/L] : clear to auscultation bilaterally [Soft] : soft [Non-tender] : non-tender [Non-distended] : non-distended [No HSM] : No HSM [No Lesions] : no lesions [No Mass] : no mass [Oriented x3] : oriented x3 [Labia Majora] : normal [Vulvar Atrophy] : vulvar atrophy [Labia Minora] : normal [Atrophy] : atrophy [Normal] : normal [Absent] : absent [Uterine Adnexae] : normal [No Tenderness] : no tenderness [Nl Sphincter Tone] : normal sphincter tone [FreeTextEntry5] : Pap done [FreeTextEntry9] : Hemoccult negative

## 2024-01-22 NOTE — HISTORY OF PRESENT ILLNESS
[FreeTextEntry1] : Patient is a 68-year-old female who presents for a gynecologic evaluation.  Patient has complaints of urinary incontinence and urgency.  Patient had urogynecology evaluation several years ago.  Advised patient to have follow-up with urogynecology, referral given.  Patient's last mammogram was December 2023 and last bone density was several years ago.  Patient with a history of hysterectomy in the remote past.

## 2024-01-22 NOTE — DISCUSSION/SUMMARY
[FreeTextEntry1] : Impression: Vulvovaginal atrophy, urinary incontinence and urgency, osteopenia  Rx: Estradiol vaginal cream-use as directed  Recommendations: Self breast exam, mammography annually, bone density DEXA, calcium vitamin D supplementation regular exercise, urogynecology consultation, Kegel exercises  Follow-up in 1 year

## 2024-01-29 LAB — CYTOLOGY CVX/VAG DOC THIN PREP: ABNORMAL

## 2024-02-07 ENCOUNTER — NON-APPOINTMENT (OUTPATIENT)
Age: 69
End: 2024-02-07

## 2024-02-23 NOTE — ED ADULT NURSE NOTE - NS ED NURSE LEVEL OF CONSCIOUSNESS ORIENTATION
Called pt to resschedule appt. Pt mom said she does not want to reschedule at this time. Electronically signed by Ally Wilkins on 2/23/2024 at 2:38 PM     Oriented - self; Oriented - place; Oriented - time

## 2024-04-08 ENCOUNTER — APPOINTMENT (OUTPATIENT)
Dept: ORTHOPEDIC SURGERY | Facility: CLINIC | Age: 69
End: 2024-04-08
Payer: MEDICARE

## 2024-04-08 VITALS — BODY MASS INDEX: 31.65 KG/M2 | WEIGHT: 190 LBS | HEIGHT: 65 IN

## 2024-04-08 DIAGNOSIS — M19.012 PRIMARY OSTEOARTHRITIS, LEFT SHOULDER: ICD-10-CM

## 2024-04-08 DIAGNOSIS — M25.512 PAIN IN LEFT SHOULDER: ICD-10-CM

## 2024-04-08 PROCEDURE — 73010 X-RAY EXAM OF SHOULDER BLADE: CPT | Mod: LT

## 2024-04-08 PROCEDURE — 73030 X-RAY EXAM OF SHOULDER: CPT | Mod: LT

## 2024-04-08 PROCEDURE — 99203 OFFICE O/P NEW LOW 30 MIN: CPT

## 2024-08-28 RX ORDER — AMOXICILLIN 250 MG/5ML
1 SUSPENSION, RECONSTITUTED, ORAL (ML) ORAL
Refills: 0 | DISCHARGE
Start: 2024-08-28 | End: 2024-09-07

## 2024-09-24 RX ORDER — NITROFURANTOIN MONOHYD/M-CRYST 100 MG
1 CAPSULE ORAL
Refills: 0 | DISCHARGE
Start: 2024-09-24

## 2024-09-25 ENCOUNTER — INPATIENT (INPATIENT)
Facility: HOSPITAL | Age: 69
LOS: 3 days | Discharge: HOME | DRG: 872 | End: 2024-09-29
Attending: INTERNAL MEDICINE | Admitting: INTERNAL MEDICINE
Payer: MEDICARE

## 2024-09-25 VITALS
SYSTOLIC BLOOD PRESSURE: 153 MMHG | TEMPERATURE: 100 F | RESPIRATION RATE: 19 BRPM | OXYGEN SATURATION: 98 % | HEART RATE: 99 BPM | DIASTOLIC BLOOD PRESSURE: 86 MMHG

## 2024-09-25 DIAGNOSIS — Z96.611 PRESENCE OF RIGHT ARTIFICIAL SHOULDER JOINT: Chronic | ICD-10-CM

## 2024-09-25 DIAGNOSIS — Z90.710 ACQUIRED ABSENCE OF BOTH CERVIX AND UTERUS: Chronic | ICD-10-CM

## 2024-09-25 DIAGNOSIS — N39.0 URINARY TRACT INFECTION, SITE NOT SPECIFIED: ICD-10-CM

## 2024-09-25 DIAGNOSIS — Z96.659 PRESENCE OF UNSPECIFIED ARTIFICIAL KNEE JOINT: Chronic | ICD-10-CM

## 2024-09-25 DIAGNOSIS — Z98.890 OTHER SPECIFIED POSTPROCEDURAL STATES: Chronic | ICD-10-CM

## 2024-09-25 LAB
ALBUMIN SERPL ELPH-MCNC: 3.5 G/DL — SIGNIFICANT CHANGE UP (ref 3.3–5)
ALP SERPL-CCNC: 78 U/L — SIGNIFICANT CHANGE UP (ref 40–120)
ALT FLD-CCNC: 22 U/L — SIGNIFICANT CHANGE UP (ref 12–78)
ANION GAP SERPL CALC-SCNC: 3 MMOL/L — LOW (ref 5–17)
APPEARANCE UR: CLEAR — SIGNIFICANT CHANGE UP
APTT BLD: 26.9 SEC — SIGNIFICANT CHANGE UP (ref 24.5–35.6)
AST SERPL-CCNC: 21 U/L — SIGNIFICANT CHANGE UP (ref 15–37)
BASOPHILS # BLD AUTO: 0.03 K/UL — SIGNIFICANT CHANGE UP (ref 0–0.2)
BASOPHILS NFR BLD AUTO: 0.4 % — SIGNIFICANT CHANGE UP (ref 0–2)
BILIRUB SERPL-MCNC: 0.4 MG/DL — SIGNIFICANT CHANGE UP (ref 0.2–1.2)
BILIRUB UR-MCNC: NEGATIVE — SIGNIFICANT CHANGE UP
BUN SERPL-MCNC: 28 MG/DL — HIGH (ref 7–23)
CALCIUM SERPL-MCNC: 9.4 MG/DL — SIGNIFICANT CHANGE UP (ref 8.5–10.1)
CHLORIDE SERPL-SCNC: 113 MMOL/L — HIGH (ref 96–108)
CO2 SERPL-SCNC: 25 MMOL/L — SIGNIFICANT CHANGE UP (ref 22–31)
COLOR SPEC: YELLOW — SIGNIFICANT CHANGE UP
CREAT SERPL-MCNC: 0.84 MG/DL — SIGNIFICANT CHANGE UP (ref 0.5–1.3)
DIFF PNL FLD: ABNORMAL
EGFR: 75 ML/MIN/1.73M2 — SIGNIFICANT CHANGE UP
EOSINOPHIL # BLD AUTO: 0.15 K/UL — SIGNIFICANT CHANGE UP (ref 0–0.5)
EOSINOPHIL NFR BLD AUTO: 2.2 % — SIGNIFICANT CHANGE UP (ref 0–6)
GLUCOSE SERPL-MCNC: 130 MG/DL — HIGH (ref 70–99)
GLUCOSE UR QL: NEGATIVE MG/DL — SIGNIFICANT CHANGE UP
HCT VFR BLD CALC: 41 % — SIGNIFICANT CHANGE UP (ref 34.5–45)
HGB BLD-MCNC: 13.4 G/DL — SIGNIFICANT CHANGE UP (ref 11.5–15.5)
IMM GRANULOCYTES NFR BLD AUTO: 0.3 % — SIGNIFICANT CHANGE UP (ref 0–0.9)
INR BLD: 0.96 RATIO — SIGNIFICANT CHANGE UP (ref 0.85–1.16)
KETONES UR-MCNC: NEGATIVE MG/DL — SIGNIFICANT CHANGE UP
LACTATE SERPL-SCNC: 1.5 MMOL/L — SIGNIFICANT CHANGE UP (ref 0.7–2)
LEUKOCYTE ESTERASE UR-ACNC: ABNORMAL
LIDOCAIN IGE QN: 29 U/L — SIGNIFICANT CHANGE UP (ref 13–75)
LYMPHOCYTES # BLD AUTO: 0.6 K/UL — LOW (ref 1–3.3)
LYMPHOCYTES # BLD AUTO: 8.9 % — LOW (ref 13–44)
MCHC RBC-ENTMCNC: 29 PG — SIGNIFICANT CHANGE UP (ref 27–34)
MCHC RBC-ENTMCNC: 32.7 GM/DL — SIGNIFICANT CHANGE UP (ref 32–36)
MCV RBC AUTO: 88.7 FL — SIGNIFICANT CHANGE UP (ref 80–100)
MONOCYTES # BLD AUTO: 0.02 K/UL — SIGNIFICANT CHANGE UP (ref 0–0.9)
MONOCYTES NFR BLD AUTO: 0.3 % — LOW (ref 2–14)
NEUTROPHILS # BLD AUTO: 5.94 K/UL — SIGNIFICANT CHANGE UP (ref 1.8–7.4)
NEUTROPHILS NFR BLD AUTO: 87.9 % — HIGH (ref 43–77)
NITRITE UR-MCNC: NEGATIVE — SIGNIFICANT CHANGE UP
PH UR: 5.5 — SIGNIFICANT CHANGE UP (ref 5–8)
PLATELET # BLD AUTO: 192 K/UL — SIGNIFICANT CHANGE UP (ref 150–400)
POTASSIUM SERPL-MCNC: 3.8 MMOL/L — SIGNIFICANT CHANGE UP (ref 3.5–5.3)
POTASSIUM SERPL-SCNC: 3.8 MMOL/L — SIGNIFICANT CHANGE UP (ref 3.5–5.3)
PROT SERPL-MCNC: 6.8 GM/DL — SIGNIFICANT CHANGE UP (ref 6–8.3)
PROT UR-MCNC: NEGATIVE MG/DL — SIGNIFICANT CHANGE UP
PROTHROM AB SERPL-ACNC: 11.3 SEC — SIGNIFICANT CHANGE UP (ref 9.9–13.4)
RBC # BLD: 4.62 M/UL — SIGNIFICANT CHANGE UP (ref 3.8–5.2)
RBC # FLD: 13 % — SIGNIFICANT CHANGE UP (ref 10.3–14.5)
SODIUM SERPL-SCNC: 141 MMOL/L — SIGNIFICANT CHANGE UP (ref 135–145)
SP GR SPEC: 1.02 — SIGNIFICANT CHANGE UP (ref 1–1.03)
UROBILINOGEN FLD QL: 0.2 MG/DL — SIGNIFICANT CHANGE UP (ref 0.2–1)
WBC # BLD: 6.76 K/UL — SIGNIFICANT CHANGE UP (ref 3.8–10.5)
WBC # FLD AUTO: 6.76 K/UL — SIGNIFICANT CHANGE UP (ref 3.8–10.5)

## 2024-09-25 PROCEDURE — 85027 COMPLETE CBC AUTOMATED: CPT

## 2024-09-25 PROCEDURE — 93971 EXTREMITY STUDY: CPT | Mod: RT

## 2024-09-25 PROCEDURE — 87040 BLOOD CULTURE FOR BACTERIA: CPT

## 2024-09-25 PROCEDURE — 80048 BASIC METABOLIC PNL TOTAL CA: CPT

## 2024-09-25 PROCEDURE — 99285 EMERGENCY DEPT VISIT HI MDM: CPT | Mod: GC

## 2024-09-25 PROCEDURE — 36415 COLL VENOUS BLD VENIPUNCTURE: CPT

## 2024-09-25 PROCEDURE — 93010 ELECTROCARDIOGRAM REPORT: CPT

## 2024-09-25 PROCEDURE — 76770 US EXAM ABDO BACK WALL COMP: CPT

## 2024-09-25 RX ORDER — ONDANSETRON HCL/PF 4 MG/2 ML
4 VIAL (ML) INJECTION ONCE
Refills: 0 | Status: COMPLETED | OUTPATIENT
Start: 2024-09-25 | End: 2024-09-25

## 2024-09-25 RX ORDER — CEFTRIAXONE SODIUM 1 G
1000 VIAL (EA) INJECTION ONCE
Refills: 0 | Status: DISCONTINUED | OUTPATIENT
Start: 2024-09-25 | End: 2024-09-25

## 2024-09-25 RX ORDER — ACETAMINOPHEN 325 MG
1000 TABLET ORAL ONCE
Refills: 0 | Status: COMPLETED | OUTPATIENT
Start: 2024-09-25 | End: 2024-09-25

## 2024-09-25 RX ORDER — ACETAMINOPHEN 325 MG
650 TABLET ORAL EVERY 6 HOURS
Refills: 0 | Status: DISCONTINUED | OUTPATIENT
Start: 2024-09-25 | End: 2024-09-26

## 2024-09-25 RX ORDER — SODIUM CHLORIDE IRRIG SOLUTION 0.9 %
2000 SOLUTION, IRRIGATION IRRIGATION ONCE
Refills: 0 | Status: COMPLETED | OUTPATIENT
Start: 2024-09-25 | End: 2024-09-25

## 2024-09-25 RX ORDER — OMEGA-3-ACID ETHYL ESTERS 1 G/1
1 CAPSULE, LIQUID FILLED ORAL
Refills: 0 | DISCHARGE

## 2024-09-25 RX ORDER — CRANBERRY FRUIT EXTRACT 650 MG
4 CAPSULE ORAL
Refills: 0 | DISCHARGE

## 2024-09-25 RX ORDER — SODIUM CHLORIDE IRRIG SOLUTION 0.9 %
1000 SOLUTION, IRRIGATION IRRIGATION ONCE
Refills: 0 | Status: DISCONTINUED | OUTPATIENT
Start: 2024-09-25 | End: 2024-09-25

## 2024-09-25 RX ORDER — CEFTRIAXONE SODIUM 1 G
1000 VIAL (EA) INJECTION ONCE
Refills: 0 | Status: COMPLETED | OUTPATIENT
Start: 2024-09-25 | End: 2024-09-25

## 2024-09-25 RX ORDER — FAMOTIDINE 40 MG
20 TABLET ORAL ONCE
Refills: 0 | Status: COMPLETED | OUTPATIENT
Start: 2024-09-25 | End: 2024-09-25

## 2024-09-25 RX ADMIN — Medication 400 MILLIGRAM(S): at 21:33

## 2024-09-25 RX ADMIN — Medication 4 MILLIGRAM(S): at 21:32

## 2024-09-25 RX ADMIN — Medication 20 MILLIGRAM(S): at 21:32

## 2024-09-25 RX ADMIN — Medication 2000 MILLILITER(S): at 21:32

## 2024-09-25 RX ADMIN — Medication 1000 MILLIGRAM(S): at 21:49

## 2024-09-25 RX ADMIN — Medication 1000 MILLIGRAM(S): at 21:33

## 2024-09-25 NOTE — ED PROVIDER NOTE - PHYSICAL EXAMINATION
Physical Exam:  Gen: NAD, AOx3, non-toxic appearing, able to ambulate  Head: NCAT  HEENT: EOMI, PEERLA, normal conjunctiva, tongue midline, oral mucosa moist  Lung: CTAB, no respiratory distress, no wheezes/rhonchi/rales B/L, speaking in full sentences  CV: RRR, no murmurs, rubs or gallops  Abd: soft, NT, ND, no guarding, no rigidity, no rebound tenderness, +bilateral CVA tenderness   MSK: no visible deformities  Neuro: No focal sensory or motor deficits  Skin: Warm, well perfused  Psych: normal affect, calm

## 2024-09-25 NOTE — ED PROVIDER NOTE - OBJECTIVE STATEMENT
68-year-old female, history of ÁNGEL, hyperlipidemia, depression/anxiety, osteopenia, presents for evaluation of a urinary tract infection.  Patient reports that a week and a half ago she started to have increased urinary urgency, foul smell of the urine, unable to completely empty her bladder.  2 days ago underwent urinalysis and found out she had a urinary tract infection with E. coli on culture.  Prescribed Macrobid yesterday and has taken 2 days of doses so far with no improvement.  Today patient has had nausea and vomiting, and rigors prompting her to come to the ED for further evaluation.  Of note had a similar presentation last year and was hospitalized for an E. coli UTI as well.

## 2024-09-25 NOTE — PHARMACOTHERAPY INTERVENTION NOTE - INTERVENTION CATEGORIES
Med Reconciliation Assessment:  AFIB, rate improved with Metoprolol, doing well on BB, HR improved  May hold Lisinopril and increase Metoprolol for HR control and to prevent too low SBP with Lisinopril as well.   Parameters for these meds should be placed  ASA continue  Failed CPAP   Post peg  Monitor SBP on meds with parameters

## 2024-09-25 NOTE — PHARMACOTHERAPY INTERVENTION NOTE - COMMENTS
Medication reconciliation completed.  Reviewed Medication list and confirmed med allergies with patient; confirmed with Dr. First Medshavonne.

## 2024-09-25 NOTE — ED PROVIDER NOTE - NS ED ATTENDING STATEMENT MOD
I have seen and examined this patient and fully participated in the care of this patient as the teaching attending.  The service was shared with the SWETA.  I reviewed and verified the documentation.

## 2024-09-25 NOTE — ED PROVIDER NOTE - ATTENDING CONTRIBUTION TO CARE
I, Maria Isabel Rao DO, personally saw the patient with the resident, and completed the key components of the history and physical exam. I then discussed the management plan with the resident.

## 2024-09-25 NOTE — ED ADULT NURSE NOTE - NSFALLRISKINTERV_ED_ALL_ED

## 2024-09-25 NOTE — ED PROVIDER NOTE - CLINICAL SUMMARY MEDICAL DECISION MAKING FREE TEXT BOX
68-year-old female, history of ÁNGEL, hyperlipidemia, depression/anxiety, osteopenia, presents for evaluation of a urinary tract infection. Febrile to 103F rectally. Physical exam significant for bilateral CVA tenderness. Concern for pyelonephritis, will obtain sepsis labs, antibiose with ceftriaxone since source is known, fluids, TBA 68-year-old female, history of ÁNGEL, hyperlipidemia, depression/anxiety, osteopenia, presents for evaluation of a urinary tract infection. Febrile to 103F rectally. Physical exam significant for bilateral CVA tenderness. Concern for pyelonephritis, will obtain sepsis labs, antibiose with ceftriaxone since source is known, fluids, TBA    Dr. Rao ED attending- 68-year-old female, history of ÁNGEL, hyperlipidemia, depression/anxiety, osteopenia presents with urinary symptoms not improved with Macrobid x2d now here with nausea, chills and subjective fever. No vomiting, abd pain, back pain, Hematuria, history of kidney stones, chest pain, shortness of breath, cough, headache, vision change, numbness tingling weakness.  Constitutional: well appearing, NAD AAOx3  Eyes: EOMI, PERRL  Head: Normocephalic atraumatic  Mouth: no airway obstruction, posterior oropharynx clear without erythema or exudate  Neck: supple  Cardiac: regular rate and rhythm, no MRG  Resp: Lungs CTAB  GI: Abd s/nt/nd, b/l CVAT  Neuro: CN2-12 intact, strength 5/5x4, sensation grossly intact  Skin: No rashes  Plan: Patient found to be febrile, sepsis protocol initiated with IV ceftriaxone.  Plan for labs, UA, IV antibiotics and admission for pyelonephritis  Pt admitted to Dr. Bill hospitalist attending for sepsis 2/2 pyelonephritis

## 2024-09-25 NOTE — ED ADULT NURSE NOTE - OBJECTIVE STATEMENT
patient complains of UTI since monday. prescribed macrobid bid tuesday, has been taking with no relief. worsening symptoms and rigors today. history of ecoli in urine with hospital admission for urosepsis. new onset of nausea with vomiting, vomiting at triage. generalized weakness, PMHx obstructive sleep apnea

## 2024-09-25 NOTE — ED ADULT TRIAGE NOTE - CHIEF COMPLAINT QUOTE
patient complains of UTI since monday. prescribed macrobid bid tuesday, has been taking with no relief. worsening symptoms and rigors today. history of ecoli in urine with hospital admission for urosepsis. new onset of nausea with vomiting, vomiting at triage. generalized weakness, placed in wheelchair from car.

## 2024-09-26 LAB
ANION GAP SERPL CALC-SCNC: 5 MMOL/L — SIGNIFICANT CHANGE UP (ref 5–17)
BUN SERPL-MCNC: 24 MG/DL — HIGH (ref 7–23)
CALCIUM SERPL-MCNC: 9.1 MG/DL — SIGNIFICANT CHANGE UP (ref 8.5–10.1)
CHLORIDE SERPL-SCNC: 111 MMOL/L — HIGH (ref 96–108)
CO2 SERPL-SCNC: 24 MMOL/L — SIGNIFICANT CHANGE UP (ref 22–31)
CREAT SERPL-MCNC: 0.75 MG/DL — SIGNIFICANT CHANGE UP (ref 0.5–1.3)
E COLI DNA BLD POS QL NAA+NON-PROBE: SIGNIFICANT CHANGE UP
EGFR: 86 ML/MIN/1.73M2 — SIGNIFICANT CHANGE UP
GLUCOSE SERPL-MCNC: 123 MG/DL — HIGH (ref 70–99)
GRAM STN FLD: ABNORMAL
GRAM STN FLD: ABNORMAL
HCT VFR BLD CALC: 38.7 % — SIGNIFICANT CHANGE UP (ref 34.5–45)
HGB BLD-MCNC: 12.8 G/DL — SIGNIFICANT CHANGE UP (ref 11.5–15.5)
MCHC RBC-ENTMCNC: 29.3 PG — SIGNIFICANT CHANGE UP (ref 27–34)
MCHC RBC-ENTMCNC: 33.1 GM/DL — SIGNIFICANT CHANGE UP (ref 32–36)
MCV RBC AUTO: 88.6 FL — SIGNIFICANT CHANGE UP (ref 80–100)
METHOD TYPE: SIGNIFICANT CHANGE UP
PLATELET # BLD AUTO: 180 K/UL — SIGNIFICANT CHANGE UP (ref 150–400)
POTASSIUM SERPL-MCNC: 3.6 MMOL/L — SIGNIFICANT CHANGE UP (ref 3.5–5.3)
POTASSIUM SERPL-SCNC: 3.6 MMOL/L — SIGNIFICANT CHANGE UP (ref 3.5–5.3)
RBC # BLD: 4.37 M/UL — SIGNIFICANT CHANGE UP (ref 3.8–5.2)
RBC # FLD: 13.2 % — SIGNIFICANT CHANGE UP (ref 10.3–14.5)
SODIUM SERPL-SCNC: 140 MMOL/L — SIGNIFICANT CHANGE UP (ref 135–145)
SPECIMEN SOURCE: SIGNIFICANT CHANGE UP
SPECIMEN SOURCE: SIGNIFICANT CHANGE UP
WBC # BLD: 13.51 K/UL — HIGH (ref 3.8–10.5)
WBC # FLD AUTO: 13.51 K/UL — HIGH (ref 3.8–10.5)

## 2024-09-26 PROCEDURE — 99222 1ST HOSP IP/OBS MODERATE 55: CPT

## 2024-09-26 PROCEDURE — 76770 US EXAM ABDO BACK WALL COMP: CPT | Mod: 26

## 2024-09-26 PROCEDURE — 93971 EXTREMITY STUDY: CPT | Mod: 26,RT

## 2024-09-26 RX ORDER — CRANBERRY FRUIT EXTRACT 650 MG
4000 CAPSULE ORAL DAILY
Refills: 0 | Status: DISCONTINUED | OUTPATIENT
Start: 2024-09-26 | End: 2024-09-29

## 2024-09-26 RX ORDER — PANTOPRAZOLE SODIUM 40 MG/1
40 TABLET, DELAYED RELEASE ORAL
Refills: 0 | Status: DISCONTINUED | OUTPATIENT
Start: 2024-09-26 | End: 2024-09-29

## 2024-09-26 RX ORDER — ACETAMINOPHEN 325 MG
650 TABLET ORAL EVERY 6 HOURS
Refills: 0 | Status: DISCONTINUED | OUTPATIENT
Start: 2024-09-26 | End: 2024-09-29

## 2024-09-26 RX ORDER — CEFTRIAXONE SODIUM 1 G
1000 VIAL (EA) INJECTION EVERY 24 HOURS
Refills: 0 | Status: DISCONTINUED | OUTPATIENT
Start: 2024-09-26 | End: 2024-09-26

## 2024-09-26 RX ORDER — MAG HYDROX/ALUMINUM HYD/SIMETH 200-200-20
30 SUSPENSION, ORAL (FINAL DOSE FORM) ORAL EVERY 4 HOURS
Refills: 0 | Status: DISCONTINUED | OUTPATIENT
Start: 2024-09-26 | End: 2024-09-29

## 2024-09-26 RX ORDER — ENOXAPARIN SODIUM 150 MG/ML
40 INJECTION SUBCUTANEOUS EVERY 24 HOURS
Refills: 0 | Status: DISCONTINUED | OUTPATIENT
Start: 2024-09-26 | End: 2024-09-29

## 2024-09-26 RX ORDER — ALPRAZOLAM 0.5 MG/1
0.5 TABLET ORAL AT BEDTIME
Refills: 0 | Status: DISCONTINUED | OUTPATIENT
Start: 2024-09-26 | End: 2024-09-29

## 2024-09-26 RX ORDER — FENOFIBRATE NANOCRYSTALLIZED 145 MG
145 TABLET ORAL AT BEDTIME
Refills: 0 | Status: DISCONTINUED | OUTPATIENT
Start: 2024-09-26 | End: 2024-09-29

## 2024-09-26 RX ORDER — 5-HYDROXYTRYPTOPHAN (5-HTP) 100 MG
3 TABLET,DISINTEGRATING ORAL AT BEDTIME
Refills: 0 | Status: DISCONTINUED | OUTPATIENT
Start: 2024-09-26 | End: 2024-09-29

## 2024-09-26 RX ORDER — TRAMADOL HYDROCHLORIDE 50 MG/1
25 TABLET, COATED ORAL
Refills: 0 | Status: DISCONTINUED | OUTPATIENT
Start: 2024-09-26 | End: 2024-09-29

## 2024-09-26 RX ORDER — SODIUM CHLORIDE 0.9 % (FLUSH) 0.9 %
1000 SYRINGE (ML) INJECTION
Refills: 0 | Status: DISCONTINUED | OUTPATIENT
Start: 2024-09-26 | End: 2024-09-26

## 2024-09-26 RX ORDER — OMEGA-3-ACID ETHYL ESTERS 1 G/1
2 CAPSULE, LIQUID FILLED ORAL DAILY
Refills: 0 | Status: DISCONTINUED | OUTPATIENT
Start: 2024-09-26 | End: 2024-09-29

## 2024-09-26 RX ORDER — ONDANSETRON HCL/PF 4 MG/2 ML
4 VIAL (ML) INJECTION EVERY 8 HOURS
Refills: 0 | Status: DISCONTINUED | OUTPATIENT
Start: 2024-09-26 | End: 2024-09-29

## 2024-09-26 RX ORDER — CEFTRIAXONE SODIUM 1 G
2000 VIAL (EA) INJECTION EVERY 24 HOURS
Refills: 0 | Status: DISCONTINUED | OUTPATIENT
Start: 2024-09-26 | End: 2024-09-26

## 2024-09-26 RX ORDER — CEFTRIAXONE SODIUM 1 G
2000 VIAL (EA) INJECTION EVERY 24 HOURS
Refills: 0 | Status: DISCONTINUED | OUTPATIENT
Start: 2024-09-26 | End: 2024-09-29

## 2024-09-26 RX ADMIN — Medication 3 MILLIGRAM(S): at 22:10

## 2024-09-26 RX ADMIN — Medication 650 MILLIGRAM(S): at 18:09

## 2024-09-26 RX ADMIN — Medication 650 MILLIGRAM(S): at 01:00

## 2024-09-26 RX ADMIN — Medication 145 MILLIGRAM(S): at 22:11

## 2024-09-26 RX ADMIN — Medication 650 MILLIGRAM(S): at 05:50

## 2024-09-26 RX ADMIN — Medication 4000 UNIT(S): at 12:28

## 2024-09-26 RX ADMIN — PANTOPRAZOLE SODIUM 40 MILLIGRAM(S): 40 TABLET, DELAYED RELEASE ORAL at 05:51

## 2024-09-26 RX ADMIN — Medication 2000 MILLIGRAM(S): at 12:30

## 2024-09-26 RX ADMIN — Medication 40 MILLIGRAM(S): at 22:10

## 2024-09-26 RX ADMIN — Medication 100 MILLILITER(S): at 05:45

## 2024-09-26 RX ADMIN — ENOXAPARIN SODIUM 40 MILLIGRAM(S): 150 INJECTION SUBCUTANEOUS at 12:30

## 2024-09-26 RX ADMIN — Medication 650 MILLIGRAM(S): at 00:19

## 2024-09-26 RX ADMIN — Medication 650 MILLIGRAM(S): at 06:25

## 2024-09-26 RX ADMIN — Medication 100 MILLILITER(S): at 01:32

## 2024-09-26 RX ADMIN — OMEGA-3-ACID ETHYL ESTERS 2 GRAM(S): 1 CAPSULE, LIQUID FILLED ORAL at 12:29

## 2024-09-26 RX ADMIN — ALPRAZOLAM 0.5 MILLIGRAM(S): 0.5 TABLET ORAL at 22:10

## 2024-09-26 NOTE — ED ADULT NURSE REASSESSMENT NOTE - NS ED NURSE REASSESS COMMENT FT1
VS as charted, respirations equal and unlabored. Pt resting comfortably. No s/s of distress noted at this time. Pending bed placement. Pt updated on plan of care and verbalized understanding.

## 2024-09-26 NOTE — H&P ADULT - HISTORY OF PRESENT ILLNESS
Chief Complaint: fever and vomiting.    · Chief Complaint: The patient is a 68y Female complaining of fever.  · HPI Objective Statement: 68-year-old female, history of ÁNGEL, hyperlipidemia, depression/anxiety, osteopenia, presents for evaluation of a urinary tract infection.  Patient reports that a week and a half ago she started to have increased urinary urgency, foul smell of the urine, unable to completely empty her bladder.  2 days ago underwent urinalysis and found out she had a urinary tract infection with E. coli on culture.  Prescribed Macrobid yesterday and has taken 2 days of doses so far with no improvement.  Today patient has had nausea and vomiting, and rigors prompting her to come to the ED for further evaluation.  Of note had a similar presentation last year and was hospitalized for an E. coli UTI as well.   Chief Complaint: fever and vomiting.    The patient is a 68y Female with significant PMH of Dyslipidemia, RBBB, Anxiety, depression, osteopenia, ÁNGEL intolerant to CPAP, GERD and others presented in ED with c/o urinary tract infection.   Patient says that she had increased urinary urgency, foul smell of the urine, unable to completely empty her bladder for last 7-10 days. She was seen by her PCP and had urine culture in [facility] which was positive for pansensitive E. Coli, and she had been started on Macrobid by her PCP which patient has been taking for last 2 days. Patient says that she has rigor with shaking and chills yesterday prompting her to come in ED. She also had vomited x 1 at home.  She still feels urinary burning sensation, and she thinks that her antibiotic is not working for her UTI.  Otherwise, she denies chest pain, sob, headache, palpitation, abdominal pain, diarrhea or constipation.  She also remembered that she had similar episode about 1-2 years ago, and was hospitalized and was found to had E. coli UTI.    Sig labs: WBC 6.76k, N 88%, Hb 13.4, CMP wnl.  Lactate normal 1.5.  Lipase normal 29.  UA: LE moderate, WBC 2 and Bacteria none.    IV fluid LR 2 L bolus and Rocephin 1 gm IV given in ED after blood culture draw.

## 2024-09-26 NOTE — H&P ADULT - NSHPLABSRESULTS_GEN_ALL_CORE
LABS:                        13.4   6.76  )-----------( 192      ( 25 Sep 2024 21:16 )             41.0     09-25    141  |  113[H]  |  28[H]  ----------------------------<  130[H]  3.8   |  25  |  0.84    Ca    9.4      25 Sep 2024 21:16    TPro  6.8  /  Alb  3.5  /  TBili  0.4  /  DBili  x   /  AST  21  /  ALT  22  /  Alk Phos  78  09-25    PT/INR - ( 25 Sep 2024 21:17 )   PT: 11.3 sec;   INR: 0.96 ratio         PTT - ( 25 Sep 2024 21:17 )  PTT:26.9 sec

## 2024-09-26 NOTE — H&P ADULT - NSICDXPASTMEDICALHX_GEN_ALL_CORE_FT
PAST MEDICAL HISTORY:  Anxiety and depression     Dyslipidemia     Incomplete bladder emptying     Osteoarthritis of both knees bilateral replacement    Osteoarthritis of right shoulder     Osteopenia     RBBB history of  "for 30 years"    Right ovarian cyst     Sleep apnea, obstructive does not use machine    Urinary incontinence

## 2024-09-26 NOTE — PROVIDER CONTACT NOTE (CRITICAL VALUE NOTIFICATION) - NAME OF MD/NP/PA/DO NOTIFIED:
Monitor summary    Rhythm: SR/ST, BBB  Rate:   Ectopy:  (R) PVC, (R) PAC  Measurement: .15/.13/.35             MD Josh richardson

## 2024-09-26 NOTE — H&P ADULT - ASSESSMENT
The patient is a 68y Female with significant PMH of Dyslipidemia, RBBB, Anxiety, depression, osteopenia, ÁNGEL intolerant to CPAP, GERD and others presented in ED with c/o urinary tract infection.   Patient says that she had increased urinary urgency, foul smell of the urine, unable to completely empty her bladder for last 7-10 days. She was seen by her PCP and had urine culture in [facility] which was positive for pansensitive E. Coli, and she had been started on Macrobid by her PCP which patient has been taking for last 2 days. Patient says that she has rigor with shaking and chills yesterday prompting her to come in ED. She also had vomited x 1 at home.  She still feels urinary burning sensation, and she thinks that her antibiotic is not working for her UTI.  Otherwise, she denies chest pain, sob, headache, palpitation, abdominal pain, diarrhea or constipation.  She also remembered that she had similar episode about 1-2 years ago, and was hospitalized and was found to had E. coli UTI.      # Acute UTI likely E. Coli UTI.  -Has failed outpatient treatment with oral antibiotics.  -Admit to medical floor.  -Gentle hydration.  -Follow blood and urine cultures.  -Rocephin 1 gm IV daily.  -Tylenol prn.    # Dyslipidemia.  -C/w her Fenofibrate.    # Anxiety and depression.  -C/w her Celexa and Xanax.    # GERD.  -On Omeprazole.    # DVT prophylaxis: Lovenox sq daily.   The patient is a 68y Female with significant PMH of Dyslipidemia, RBBB, Anxiety, depression, osteopenia, ÁNGEL intolerant to CPAP, GERD and others presented in ED with c/o urinary tract infection.   Patient says that she had increased urinary urgency, foul smell of the urine, unable to completely empty her bladder for last 7-10 days. She was seen by her PCP and had urine culture in [facility] which was positive for pansensitive E. Coli, and she had been started on Macrobid by her PCP which patient has been taking for last 2 days. Patient says that she has rigor with shaking and chills yesterday prompting her to come in ED. She also had vomited x 1 at home.  She still feels urinary burning sensation, and she thinks that her antibiotic is not working for her UTI.  Otherwise, she denies chest pain, sob, headache, palpitation, abdominal pain, diarrhea or constipation.  She also remembered that she had similar episode about 1-2 years ago, and was hospitalized and was found to had E. coli UTI.      # Acute UTI likely E. Coli UTI.  -Has failed outpatient treatment with oral antibiotics.  -Admit to medical floor.  -Gentle hydration.  -Follow blood and urine cultures.  -Rocephin 1 gm IV daily.  -Tylenol prn.    # Dyslipidemia.  -C/w her Fenofibrate.    # Anxiety and depression.  -C/w her Celexa and Xanax.    # H/o Osteopenia.  -C/w her cholecalciferol supplement.    # GERD.  -On Omeprazole.    # DVT prophylaxis: Lovenox sq daily.

## 2024-09-27 PROCEDURE — 99232 SBSQ HOSP IP/OBS MODERATE 35: CPT

## 2024-09-27 RX ADMIN — Medication 3 MILLIGRAM(S): at 21:36

## 2024-09-27 RX ADMIN — ENOXAPARIN SODIUM 40 MILLIGRAM(S): 150 INJECTION SUBCUTANEOUS at 10:17

## 2024-09-27 RX ADMIN — Medication 145 MILLIGRAM(S): at 21:36

## 2024-09-27 RX ADMIN — Medication 40 MILLIGRAM(S): at 21:36

## 2024-09-27 RX ADMIN — Medication 2000 MILLIGRAM(S): at 10:17

## 2024-09-27 RX ADMIN — ALPRAZOLAM 0.5 MILLIGRAM(S): 0.5 TABLET ORAL at 21:36

## 2024-09-27 RX ADMIN — OMEGA-3-ACID ETHYL ESTERS 2 GRAM(S): 1 CAPSULE, LIQUID FILLED ORAL at 10:17

## 2024-09-27 RX ADMIN — PANTOPRAZOLE SODIUM 40 MILLIGRAM(S): 40 TABLET, DELAYED RELEASE ORAL at 06:01

## 2024-09-27 RX ADMIN — Medication 4000 UNIT(S): at 10:18

## 2024-09-28 LAB
-  AMPICILLIN/SULBACTAM: SIGNIFICANT CHANGE UP
-  AMPICILLIN: SIGNIFICANT CHANGE UP
-  AZTREONAM: SIGNIFICANT CHANGE UP
-  CEFAZOLIN: SIGNIFICANT CHANGE UP
-  CEFEPIME: SIGNIFICANT CHANGE UP
-  CEFOXITIN: SIGNIFICANT CHANGE UP
-  CEFTRIAXONE: SIGNIFICANT CHANGE UP
-  CIPROFLOXACIN: SIGNIFICANT CHANGE UP
-  ERTAPENEM: SIGNIFICANT CHANGE UP
-  GENTAMICIN: SIGNIFICANT CHANGE UP
-  IMIPENEM: SIGNIFICANT CHANGE UP
-  LEVOFLOXACIN: SIGNIFICANT CHANGE UP
-  MEROPENEM: SIGNIFICANT CHANGE UP
-  PIPERACILLIN/TAZOBACTAM: SIGNIFICANT CHANGE UP
-  TOBRAMYCIN: SIGNIFICANT CHANGE UP
-  TRIMETHOPRIM/SULFAMETHOXAZOLE: SIGNIFICANT CHANGE UP
CULTURE RESULTS: ABNORMAL
CULTURE RESULTS: ABNORMAL
METHOD TYPE: SIGNIFICANT CHANGE UP
ORGANISM # SPEC MICROSCOPIC CNT: ABNORMAL
ORGANISM # SPEC MICROSCOPIC CNT: ABNORMAL
ORGANISM # SPEC MICROSCOPIC CNT: SIGNIFICANT CHANGE UP
SPECIMEN SOURCE: SIGNIFICANT CHANGE UP
SPECIMEN SOURCE: SIGNIFICANT CHANGE UP

## 2024-09-28 PROCEDURE — 99232 SBSQ HOSP IP/OBS MODERATE 35: CPT

## 2024-09-28 RX ADMIN — Medication 3 MILLIGRAM(S): at 21:09

## 2024-09-28 RX ADMIN — Medication 145 MILLIGRAM(S): at 21:09

## 2024-09-28 RX ADMIN — Medication 4000 UNIT(S): at 10:48

## 2024-09-28 RX ADMIN — ENOXAPARIN SODIUM 40 MILLIGRAM(S): 150 INJECTION SUBCUTANEOUS at 12:54

## 2024-09-28 RX ADMIN — PANTOPRAZOLE SODIUM 40 MILLIGRAM(S): 40 TABLET, DELAYED RELEASE ORAL at 06:44

## 2024-09-28 RX ADMIN — ALPRAZOLAM 0.5 MILLIGRAM(S): 0.5 TABLET ORAL at 21:09

## 2024-09-28 RX ADMIN — Medication 40 MILLIGRAM(S): at 21:08

## 2024-09-28 RX ADMIN — OMEGA-3-ACID ETHYL ESTERS 2 GRAM(S): 1 CAPSULE, LIQUID FILLED ORAL at 10:48

## 2024-09-28 RX ADMIN — Medication 2000 MILLIGRAM(S): at 12:54

## 2024-09-28 NOTE — PROGRESS NOTE ADULT - SUBJECTIVE AND OBJECTIVE BOX
CC: fever and vomiting.    HPI:  The patient is a 68y Female with significant PMH of Dyslipidemia, RBBB, Anxiety, depression, osteopenia, ÁNGEL intolerant to CPAP, GERD and others presented in ED with c/o urinary tract infection.   Patient says that she had increased urinary urgency, foul smell of the urine, unable to completely empty her bladder for last 7-10 days. She was seen by her PCP and had urine culture in [facility] which was positive for pansensitive E. Coli, and she had been started on Macrobid by her PCP which patient has been taking for last 2 days. Patient says that she has rigor with shaking and chills yesterday prompting her to come in ED. She also had vomited x 1 at home.  She still feels urinary burning sensation, and she thinks that her antibiotic is not working for her UTI.  Otherwise, she denies chest pain, sob, headache, palpitation, abdominal pain, diarrhea or constipation.  She also remembered that she had similar episode about 1-2 years ago, and was hospitalized and was found to had E. coli UTI.    Sig labs: WBC 6.76k, N 88%, Hb 13.4, CMP wnl.  Lactate normal 1.5.  Lipase normal 29.  UA: LE moderate, WBC 2 and Bacteria none.    IV fluid LR 2 L bolus and Rocephin 1 gm IV given in ED after blood culture draw.      S:  9/26: Pt awake, alert, states she has some cramping in right leg.  Otherwise comfortable.  Discussed plan of care and addressed all questions and concerns to the best of my ability.  9/27: Awake, alert, feels well, no complaints today. Minesh 100.3 yesterday.    REVIEW OF SYSTEMS: All other review of systems is negative unless indicated above.      Vital Signs Last 24 Hrs  T(C): 36.9 (27 Sep 2024 07:29), Max: 37.9 (26 Sep 2024 17:00)  T(F): 98.5 (27 Sep 2024 07:29), Max: 100.3 (26 Sep 2024 17:00)  HR: 55 (27 Sep 2024 07:29) (55 - 75)  BP: 115/60 (27 Sep 2024 07:29) (112/68 - 115/60)  BP(mean): --  RR: 18 (27 Sep 2024 07:29) (18 - 18)  SpO2: 98% (27 Sep 2024 07:29) (96% - 98%)    Parameters below as of 27 Sep 2024 07:29  Patient On (Oxygen Delivery Method): room air        PHYSICAL EXAM:    Constitutional: NAD, awake and alert  HEENT: PERR, EOMI, Normal Hearing, MMM  Neck: Soft and supple  Respiratory: Breath sounds are clear bilaterally, No wheezing, rales or rhonchi  Cardiovascular: S1 and S2, regular rate and rhythm, no Murmurs, gallops or rubs  Gastrointestinal: Bowel Sounds present, soft, nontender, nondistended, no guarding, no rebound  Extremities: No peripheral edema  Neurological: A/O x 3, no focal deficits in my limited exam      MEDICATIONS  (STANDING):  ALPRAZolam 0.5 milliGRAM(s) Oral at bedtime  cefTRIAXone Injectable. 2000 milliGRAM(s) IV Push every 24 hours  cholecalciferol 4000 Unit(s) Oral daily  citalopram 40 milliGRAM(s) Oral at bedtime  enoxaparin Injectable 40 milliGRAM(s) SubCutaneous every 24 hours  fenofibrate Tablet 145 milliGRAM(s) Oral at bedtime  omega-3-Acid Ethyl Esters 2 Gram(s) Oral daily  pantoprazole    Tablet 40 milliGRAM(s) Oral before breakfast    MEDICATIONS  (PRN):  acetaminophen     Tablet .. 650 milliGRAM(s) Oral every 6 hours PRN Temp greater or equal to 38C (100.4F), Mild Pain (1 - 3)  aluminum hydroxide/magnesium hydroxide/simethicone Suspension 30 milliLiter(s) Oral every 4 hours PRN Dyspepsia  melatonin 3 milliGRAM(s) Oral at bedtime PRN Insomnia  ondansetron Injectable 4 milliGRAM(s) IV Push every 8 hours PRN Nausea and/or Vomiting  traMADol 25 milliGRAM(s) Oral two times a day PRN Severe Pain (7 - 10)                                12.8   13.51 )-----------( 180      ( 26 Sep 2024 09:48 )             38.7     09-26    140  |  111[H]  |  24[H]  ----------------------------<  123[H]  3.6   |  24  |  0.75    Ca    9.1      26 Sep 2024 09:48    TPro  6.8  /  Alb  3.5  /  TBili  0.4  /  DBili  x   /  AST  21  /  ALT  22  /  AlkPhos  78  09-25    CAPILLARY BLOOD GLUCOSE        LIVER FUNCTIONS - ( 25 Sep 2024 21:16 )  Alb: 3.5 g/dL / Pro: 6.8 gm/dL / ALK PHOS: 78 U/L / ALT: 22 U/L / AST: 21 U/L / GGT: x           PT/INR - ( 25 Sep 2024 21:17 )   PT: 11.3 sec;   INR: 0.96 ratio         PTT - ( 25 Sep 2024 21:17 )  PTT:26.9 sec  Urinalysis Basic - ( 26 Sep 2024 09:48 )    Color: x / Appearance: x / SG: x / pH: x  Gluc: 123 mg/dL / Ketone: x  / Bili: x / Urobili: x   Blood: x / Protein: x / Nitrite: x   Leuk Esterase: x / RBC: x / WBC x   Sq Epi: x / Non Sq Epi: x / Bacteria: x          Assessment/Plan:  68y Female with significant PMH of Dyslipidemia, RBBB, Anxiety, depression, osteopenia, ÁNGEL intolerant to CPAP, GERD and others presented in ED with c/o urinary tract infection.      # Acute UTI likely E. Coli UTI / Ecoli bacteremia:   - Has failed outpatient treatment with oral antibiotics.  - s/p IVFs  - BCx: Ecoli, f/u sensitivities  - c/w rocephin 2g qd  - renal US normal  - Tylenol prn/supportive care  - leg doppler neg   - AM BCx x 2    # Dyslipidemia.  -C/w her Fenofibrate.    # Anxiety and depression.  -C/w her Celexa and Xanax.    # H/o Osteopenia.  -C/w her cholecalciferol supplement.    # GERD.  -On Omeprazole.    # DVT prophylaxis: Lovenox sq daily.    Dispo:  - d/c home once repeat bcx and sensitivities of ecoli result 
CC: fever and vomiting.    HPI:  The patient is a 68y Female with significant PMH of Dyslipidemia, RBBB, Anxiety, depression, osteopenia, ÁNGEL intolerant to CPAP, GERD and others presented in ED with c/o urinary tract infection.   Patient says that she had increased urinary urgency, foul smell of the urine, unable to completely empty her bladder for last 7-10 days. She was seen by her PCP and had urine culture in [facility] which was positive for pansensitive E. Coli, and she had been started on Macrobid by her PCP which patient has been taking for last 2 days. Patient says that she has rigor with shaking and chills yesterday prompting her to come in ED. She also had vomited x 1 at home.  She still feels urinary burning sensation, and she thinks that her antibiotic is not working for her UTI.  Otherwise, she denies chest pain, sob, headache, palpitation, abdominal pain, diarrhea or constipation.  She also remembered that she had similar episode about 1-2 years ago, and was hospitalized and was found to had E. coli UTI.    Sig labs: WBC 6.76k, N 88%, Hb 13.4, CMP wnl.  Lactate normal 1.5.  Lipase normal 29.  UA: LE moderate, WBC 2 and Bacteria none.    IV fluid LR 2 L bolus and Rocephin 1 gm IV given in ED after blood culture draw.      S:  9/26: Pt awake, alert, states she has some cramping in right leg.  Otherwise comfortable.  Discussed plan of care and addressed all questions and concerns to the best of my ability.      REVIEW OF SYSTEMS: All other review of systems is negative unless indicated above.      Vital Signs Last 24 Hrs  T(C): 37.3 (26 Sep 2024 08:29), Max: 39.4 (25 Sep 2024 21:13)  T(F): 99.2 (26 Sep 2024 08:29), Max: 103 (25 Sep 2024 21:13)  HR: 82 (26 Sep 2024 08:29) (80 - 99)  BP: 99/65 (26 Sep 2024 08:29) (99/65 - 153/86)  BP(mean): 78 (26 Sep 2024 04:07) (70 - 78)  RR: 18 (26 Sep 2024 08:29) (16 - 19)  SpO2: 98% (26 Sep 2024 08:29) (94% - 100%)    Parameters below as of 26 Sep 2024 08:29  Patient On (Oxygen Delivery Method): room air      PHYSICAL EXAM:    Constitutional: NAD, awake and alert  HEENT: PERR, EOMI, Normal Hearing, MMM  Neck: Soft and supple  Respiratory: Breath sounds are clear bilaterally, No wheezing, rales or rhonchi  Cardiovascular: S1 and S2, regular rate and rhythm, no Murmurs, gallops or rubs  Gastrointestinal: Bowel Sounds present, soft, nontender, nondistended, no guarding, no rebound  Extremities: No peripheral edema  Neurological: A/O x 3, no focal deficits in my limited exam    MEDICATIONS  (STANDING):  ALPRAZolam 0.5 milliGRAM(s) Oral at bedtime  cefTRIAXone Injectable. 2000 milliGRAM(s) IV Push every 24 hours  cholecalciferol 4000 Unit(s) Oral daily  citalopram 40 milliGRAM(s) Oral at bedtime  enoxaparin Injectable 40 milliGRAM(s) SubCutaneous every 24 hours  fenofibrate Tablet 145 milliGRAM(s) Oral at bedtime  omega-3-Acid Ethyl Esters 2 Gram(s) Oral daily  pantoprazole    Tablet 40 milliGRAM(s) Oral before breakfast    MEDICATIONS  (PRN):  acetaminophen     Tablet .. 650 milliGRAM(s) Oral every 6 hours PRN Temp greater or equal to 38C (100.4F), Mild Pain (1 - 3)  aluminum hydroxide/magnesium hydroxide/simethicone Suspension 30 milliLiter(s) Oral every 4 hours PRN Dyspepsia  melatonin 3 milliGRAM(s) Oral at bedtime PRN Insomnia  ondansetron Injectable 4 milliGRAM(s) IV Push every 8 hours PRN Nausea and/or Vomiting  traMADol 25 milliGRAM(s) Oral two times a day PRN Severe Pain (7 - 10)                                12.8   13.51 )-----------( 180      ( 26 Sep 2024 09:48 )             38.7     09-26    140  |  111[H]  |  24[H]  ----------------------------<  123[H]  3.6   |  24  |  0.75    Ca    9.1      26 Sep 2024 09:48    TPro  6.8  /  Alb  3.5  /  TBili  0.4  /  DBili  x   /  AST  21  /  ALT  22  /  AlkPhos  78  09-25    CAPILLARY BLOOD GLUCOSE        LIVER FUNCTIONS - ( 25 Sep 2024 21:16 )  Alb: 3.5 g/dL / Pro: 6.8 gm/dL / ALK PHOS: 78 U/L / ALT: 22 U/L / AST: 21 U/L / GGT: x           PT/INR - ( 25 Sep 2024 21:17 )   PT: 11.3 sec;   INR: 0.96 ratio         PTT - ( 25 Sep 2024 21:17 )  PTT:26.9 sec  Urinalysis Basic - ( 26 Sep 2024 09:48 )    Color: x / Appearance: x / SG: x / pH: x  Gluc: 123 mg/dL / Ketone: x  / Bili: x / Urobili: x   Blood: x / Protein: x / Nitrite: x   Leuk Esterase: x / RBC: x / WBC x   Sq Epi: x / Non Sq Epi: x / Bacteria: x        Culture Results:   Growth in aerobic bottle: Gram Negative Rods (09-25-24 @ 21:24)  Culture Results:   Growth in aerobic and anaerobic bottles: Gram Negative Rods  Direct identification is available within approximately 3-5  hours either by Blood Panel Multiplexed PCR or Direct  MALDI-TOF. Details: [email or web address] (09-25-24 @ 21:17)      Assessment/Plan:  68y Female with significant PMH of Dyslipidemia, RBBB, Anxiety, depression, osteopenia, ÁNGEL intolerant to CPAP, GERD and others presented in ED with c/o urinary tract infection.      # Acute UTI likely E. Coli UTI / Ecoli bacteremia:   -Has failed outpatient treatment with oral antibiotics.  - stop further IVFs  - BCx: Ecoli, f/u sensitivities  - increase rocephin 1g --> 2g qd  - bladder US pending  -Tylenol prn/supportive care  - leg ultrasound for cramping, r/o dvt     # Dyslipidemia.  -C/w her Fenofibrate.    # Anxiety and depression.  -C/w her Celexa and Xanax.    # H/o Osteopenia.  -C/w her cholecalciferol supplement.    # GERD.  -On Omeprazole.    # DVT prophylaxis: Lovenox sq daily.      
CC: fever and vomiting.    HPI:  The patient is a 68y Female with significant PMH of Dyslipidemia, RBBB, Anxiety, depression, osteopenia, ÁNGEL intolerant to CPAP, GERD and others presented in ED with c/o urinary tract infection.   Patient says that she had increased urinary urgency, foul smell of the urine, unable to completely empty her bladder for last 7-10 days. She was seen by her PCP and had urine culture in [facility] which was positive for pansensitive E. Coli, and she had been started on Macrobid by her PCP which patient has been taking for last 2 days. Patient says that she has rigor with shaking and chills yesterday prompting her to come in ED. She also had vomited x 1 at home.  She still feels urinary burning sensation, and she thinks that her antibiotic is not working for her UTI.  Otherwise, she denies chest pain, sob, headache, palpitation, abdominal pain, diarrhea or constipation.  She also remembered that she had similar episode about 1-2 years ago, and was hospitalized and was found to had E. coli UTI.    Sig labs: WBC 6.76k, N 88%, Hb 13.4, CMP wnl.  Lactate normal 1.5.  Lipase normal 29.  UA: LE moderate, WBC 2 and Bacteria none.    IV fluid LR 2 L bolus and Rocephin 1 gm IV given in ED after blood culture draw.      S:  9/26: Pt awake, alert, states she has some cramping in right leg.  Otherwise comfortable.  Discussed plan of care and addressed all questions and concerns to the best of my ability.  9/27: Awake, alert, feels well, no complaints today. Minesh 100.3 yesterday.  9/28: Feeling better.  Ecoli sensitive to multiple oral antibiotics.  Awaiting repeat BCxs.  Plan for discharge home tomorrow.      REVIEW OF SYSTEMS: All other review of systems is negative unless indicated above.      Vital Signs Last 24 Hrs  T(C): 36.7 (28 Sep 2024 09:25), Max: 36.9 (28 Sep 2024 00:25)  T(F): 98 (28 Sep 2024 09:25), Max: 98.5 (28 Sep 2024 00:25)  HR: 70 (28 Sep 2024 09:25) (70 - 75)  BP: 134/93 (28 Sep 2024 09:25) (117/66 - 134/93)  BP(mean): --  RR: 18 (28 Sep 2024 00:25) (18 - 18)  SpO2: 98% (28 Sep 2024 09:25) (95% - 98%)    Parameters below as of 28 Sep 2024 09:25  Patient On (Oxygen Delivery Method): room air      PHYSICAL EXAM:    Constitutional: NAD, awake and alert  HEENT: PERR, EOMI, Normal Hearing, MMM  Neck: Soft and supple  Respiratory: Breath sounds are clear bilaterally, No wheezing, rales or rhonchi  Cardiovascular: S1 and S2, regular rate and rhythm, no Murmurs, gallops or rubs  Gastrointestinal: Bowel Sounds present, soft, nontender, nondistended, no guarding, no rebound  Extremities: No peripheral edema  Neurological: A/O x 3, no focal deficits in my limited exam      MEDICATIONS  (STANDING):  ALPRAZolam 0.5 milliGRAM(s) Oral at bedtime  cefTRIAXone Injectable. 2000 milliGRAM(s) IV Push every 24 hours  cholecalciferol 4000 Unit(s) Oral daily  citalopram 40 milliGRAM(s) Oral at bedtime  enoxaparin Injectable 40 milliGRAM(s) SubCutaneous every 24 hours  fenofibrate Tablet 145 milliGRAM(s) Oral at bedtime  omega-3-Acid Ethyl Esters 2 Gram(s) Oral daily  pantoprazole    Tablet 40 milliGRAM(s) Oral before breakfast    MEDICATIONS  (PRN):  acetaminophen     Tablet .. 650 milliGRAM(s) Oral every 6 hours PRN Temp greater or equal to 38C (100.4F), Mild Pain (1 - 3)  aluminum hydroxide/magnesium hydroxide/simethicone Suspension 30 milliLiter(s) Oral every 4 hours PRN Dyspepsia  melatonin 3 milliGRAM(s) Oral at bedtime PRN Insomnia  ondansetron Injectable 4 milliGRAM(s) IV Push every 8 hours PRN Nausea and/or Vomiting  traMADol 25 milliGRAM(s) Oral two times a day PRN Severe Pain (7 - 10)      CAPILLARY BLOOD GLUCOSE      Assessment/Plan:  68y Female with significant PMH of Dyslipidemia, RBBB, Anxiety, depression, osteopenia, ÁNGEL intolerant to CPAP, GERD and others presented in ED with c/o urinary tract infection.      # Acute UTI likely E. Coli UTI / Ecoli bacteremia:   - Has failed outpatient treatment with oral antibiotics.  - s/p IVFs  - BCx: Ecoli x 2  - c/w rocephin 2g qd  - renal US normal  - Tylenol prn/supportive care  - leg doppler neg   - BCx 9/28 pending    # Dyslipidemia.  -C/w her Fenofibrate.    # Anxiety and depression.  -C/w her Celexa and Xanax.    # H/o Osteopenia.  -C/w her cholecalciferol supplement.    # GERD.  -On Omeprazole.    # DVT prophylaxis: Lovenox sq daily.    Dispo:  - d/c home tomorrow on oral cefpodoxime to complete 14 days total.

## 2024-09-29 VITALS
TEMPERATURE: 98 F | SYSTOLIC BLOOD PRESSURE: 149 MMHG | OXYGEN SATURATION: 96 % | HEART RATE: 81 BPM | DIASTOLIC BLOOD PRESSURE: 93 MMHG

## 2024-09-29 PROCEDURE — 99239 HOSP IP/OBS DSCHRG MGMT >30: CPT

## 2024-09-29 RX ORDER — AMOXICILLIN 250 MG/5ML
4 SUSPENSION, RECONSTITUTED, ORAL (ML) ORAL
Refills: 0 | DISCHARGE

## 2024-09-29 RX ORDER — CEFPODOXIME PROXETIL 50 MG/5 ML
1 SUSPENSION, RECONSTITUTED, ORAL (ML) ORAL
Qty: 18 | Refills: 0
Start: 2024-09-29 | End: 2024-10-07

## 2024-09-29 RX ADMIN — Medication 4000 UNIT(S): at 09:35

## 2024-09-29 RX ADMIN — Medication 2000 MILLIGRAM(S): at 12:48

## 2024-09-29 RX ADMIN — OMEGA-3-ACID ETHYL ESTERS 2 GRAM(S): 1 CAPSULE, LIQUID FILLED ORAL at 09:35

## 2024-09-29 RX ADMIN — Medication 650 MILLIGRAM(S): at 12:47

## 2024-09-29 NOTE — DISCHARGE NOTE PROVIDER - HOSPITAL COURSE
CC: fever and vomiting.    HPI:  The patient is a 68y Female with significant PMH of Dyslipidemia, RBBB, Anxiety, depression, osteopenia, ÁNGEL intolerant to CPAP, GERD and others presented in ED with c/o urinary tract infection.   Patient says that she had increased urinary urgency, foul smell of the urine, unable to completely empty her bladder for last 7-10 days. She was seen by her PCP and had urine culture in [facility] which was positive for pansensitive E. Coli, and she had been started on Macrobid by her PCP which patient has been taking for last 2 days. Patient says that she has rigor with shaking and chills yesterday prompting her to come in ED. She also had vomited x 1 at home.  She still feels urinary burning sensation, and she thinks that her antibiotic is not working for her UTI.  Otherwise, she denies chest pain, sob, headache, palpitation, abdominal pain, diarrhea or constipation.  She also remembered that she had similar episode about 1-2 years ago, and was hospitalized and was found to had E. coli UTI.    Sig labs: WBC 6.76k, N 88%, Hb 13.4, CMP wnl.  Lactate normal 1.5.  Lipase normal 29.  UA: LE moderate, WBC 2 and Bacteria none.  IV fluid LR 2 L bolus and Rocephin 1 gm IV given in ED after blood culture draw.    Hospital course:  Pt treated with acute UTI with Ecoli bacteremia.  Pt placed on IV ceftriaxone and completed 5 days.  BCx growing Ecoli.  Pt feeling better, no fever, chills, n, v. Plan to discharge on cefpodoxime x 14 days total.      REVIEW OF SYSTEMS: All other review of systems is negative unless indicated above.      Vital Signs Last 24 Hrs  T(C): 36.8 (28 Sep 2024 22:00), Max: 36.9 (28 Sep 2024 15:38)  T(F): 98.2 (28 Sep 2024 22:00), Max: 98.4 (28 Sep 2024 15:38)  HR: 70 (28 Sep 2024 22:00) (70 - 79)  BP: 120/75 (28 Sep 2024 22:00) (120/75 - 134/93)  BP(mean): --  RR: 18 (28 Sep 2024 22:00) (17 - 18)  SpO2: 97% (28 Sep 2024 22:00) (97% - 98%)    Parameters below as of 28 Sep 2024 22:00  Patient On (Oxygen Delivery Method): room air    PHYSICAL EXAM:    Constitutional: NAD, awake and alert  HEENT: PERR, EOMI, Normal Hearing, MMM  Neck: Soft and supple  Respiratory: Breath sounds are clear bilaterally, No wheezing, rales or rhonchi  Cardiovascular: S1 and S2, regular rate and rhythm, no Murmurs, gallops or rubs  Gastrointestinal: Bowel Sounds present, soft, nontender, nondistended, no guarding, no rebound  Extremities: No peripheral edema  Neurological: A/O x 3, no focal deficits in my limited exam      med/labs: Reviewed and interpreted       Assessment/Plan:  68y Female with significant PMH of Dyslipidemia, RBBB, Anxiety, depression, osteopenia, ÁNGEL intolerant to CPAP, GERD and others presented in ED with c/o urinary tract infection.      # Acute UTI likely E. Coli UTI / Ecoli bacteremia:   - Has failed outpatient treatment with oral antibiotics.  - s/p IVFs  - BCx: Ecoli x 2  - c/w rocephin 2g qd x day 5 and complete 14 days total with antibiotics at home.  - renal US normal  - Tylenol prn/supportive care  - leg doppler neg   - BCx 9/28 neg    # Dyslipidemia.  -C/w her Fenofibrate.    # Anxiety and depression.  -C/w her Celexa and Xanax.    # H/o Osteopenia.  -C/w her cholecalciferol supplement.    # GERD.  -On Omeprazole.    # DVT prophylaxis: Lovenox sq daily.    Dispo:  - d/c home on oral cefpodoxime to complete 14 days total.    Attending Statement: 40 minutes spent on total encounter and discharge planning.

## 2024-09-29 NOTE — DISCHARGE NOTE PROVIDER - CARE PROVIDER_API CALL
Nestor Blankenship  Cardiovascular Disease  [address], [address]  [place] [address]  Phone: [phone]  Fax: [phone]  Follow Up Time: 1 week

## 2024-09-29 NOTE — DISCHARGE NOTE PROVIDER - NSDCFUADDAPPT_GEN_ALL_CORE_FT
APPTS ARE READY TO BE MADE: [ x ] YES    Best Family or Patient Contact (if needed):    Additional Information about above appointments (if needed):    1:   2:   3:     Other comments or requests:    APPTS ARE READY TO BE MADE: [ x ] YES    Best Family or Patient Contact (if needed):    Additional Information about above appointments (if needed):    1:   2:   3:     Other comments or requests:             Patient informed us they already have secured a follow up appointment which was not scheduled by our team with Cardiovascular on 10/21/24 at 9:00 a.m.

## 2024-09-29 NOTE — DISCHARGE NOTE PROVIDER - NSDCCPCAREPLAN_GEN_ALL_CORE_FT
PRINCIPAL DISCHARGE DIAGNOSIS  Diagnosis: Bacteremia  Assessment and Plan of Treatment: Due to Ecoli UTI  -take antibiotics as prescribed  -follow up with pcp and or urologist

## 2024-09-29 NOTE — DISCHARGE NOTE PROVIDER - NSDCMRMEDTOKEN_GEN_ALL_CORE_FT
cefpodoxime 200 mg oral tablet: 1 tab(s) orally 2 times a day  CeleXA 40 mg oral tablet: 1 tab(s) orally once a day (at bedtime)  fenofibrate 160 mg oral tablet: 1 tab(s) orally once a day (at bedtime)  Fish Oil 1000 mg oral capsule: 1 cap(s) orally once a day  omeprazole 40 mg oral delayed release capsule: 1 cap(s) orally once a day  Vitamin D3 25 mcg (1000 intl units) oral capsule: 4 cap(s) orally once a day  Xanax 0.5 mg oral tablet: 1 tab(s) orally once a day (at bedtime)

## 2024-09-29 NOTE — DISCHARGE NOTE NURSING/CASE MANAGEMENT/SOCIAL WORK - PATIENT PORTAL LINK FT
You can access the FollowMyHealth Patient Portal offered by [facility] by registering at the following website: [email or web address]. By joining [facility]’s FollowMyHealth portal, you will also be able to view your health information using other applications (apps) compatible with our system.

## 2024-10-11 DIAGNOSIS — F32.A DEPRESSION, UNSPECIFIED: ICD-10-CM

## 2024-10-11 DIAGNOSIS — A41.51 SEPSIS DUE TO ESCHERICHIA COLI [E. COLI]: ICD-10-CM

## 2024-10-11 DIAGNOSIS — G47.33 OBSTRUCTIVE SLEEP APNEA (ADULT) (PEDIATRIC): ICD-10-CM

## 2024-10-11 DIAGNOSIS — Z96.611 PRESENCE OF RIGHT ARTIFICIAL SHOULDER JOINT: ICD-10-CM

## 2024-10-11 DIAGNOSIS — M85.80 OTHER SPECIFIED DISORDERS OF BONE DENSITY AND STRUCTURE, UNSPECIFIED SITE: ICD-10-CM

## 2024-10-11 DIAGNOSIS — Z96.653 PRESENCE OF ARTIFICIAL KNEE JOINT, BILATERAL: ICD-10-CM

## 2024-10-11 DIAGNOSIS — K21.9 GASTRO-ESOPHAGEAL REFLUX DISEASE WITHOUT ESOPHAGITIS: ICD-10-CM

## 2024-10-11 DIAGNOSIS — R50.9 FEVER, UNSPECIFIED: ICD-10-CM

## 2024-10-11 DIAGNOSIS — N12 TUBULO-INTERSTITIAL NEPHRITIS, NOT SPECIFIED AS ACUTE OR CHRONIC: ICD-10-CM

## 2024-10-11 DIAGNOSIS — Z90.710 ACQUIRED ABSENCE OF BOTH CERVIX AND UTERUS: ICD-10-CM

## 2024-10-11 DIAGNOSIS — E78.5 HYPERLIPIDEMIA, UNSPECIFIED: ICD-10-CM

## 2025-01-07 ENCOUNTER — APPOINTMENT (OUTPATIENT)
Dept: UROLOGY | Facility: CLINIC | Age: 70
End: 2025-01-07
Payer: MEDICARE

## 2025-01-07 VITALS
WEIGHT: 190 LBS | RESPIRATION RATE: 16 BRPM | BODY MASS INDEX: 31.65 KG/M2 | DIASTOLIC BLOOD PRESSURE: 78 MMHG | HEIGHT: 65 IN | HEART RATE: 85 BPM | OXYGEN SATURATION: 96 % | SYSTOLIC BLOOD PRESSURE: 133 MMHG

## 2025-01-07 DIAGNOSIS — Z87.440 PERSONAL HISTORY OF URINARY (TRACT) INFECTIONS: ICD-10-CM

## 2025-01-07 DIAGNOSIS — N39.41 URGE INCONTINENCE: ICD-10-CM

## 2025-01-07 DIAGNOSIS — N39.3 STRESS INCONTINENCE (FEMALE) (MALE): ICD-10-CM

## 2025-01-07 PROCEDURE — 99203 OFFICE O/P NEW LOW 30 MIN: CPT

## 2025-01-07 PROCEDURE — 51798 US URINE CAPACITY MEASURE: CPT

## 2025-01-07 RX ORDER — VIBEGRON 75 MG/1
75 TABLET, FILM COATED ORAL
Qty: 90 | Refills: 3 | Status: ACTIVE | COMMUNITY
Start: 2025-01-07 | End: 1900-01-01

## 2025-01-08 LAB
APPEARANCE: CLEAR
BACTERIA: NEGATIVE /HPF
BILIRUBIN URINE: NEGATIVE
BLOOD URINE: NEGATIVE
CAST: 1 /LPF
COLOR: YELLOW
EPITHELIAL CELLS: 1 /HPF
GLUCOSE QUALITATIVE U: NEGATIVE MG/DL
KETONES URINE: NEGATIVE MG/DL
LEUKOCYTE ESTERASE URINE: NEGATIVE
MICROSCOPIC-UA: NORMAL
NITRITE URINE: NEGATIVE
PH URINE: 6.5
PROTEIN URINE: NEGATIVE MG/DL
RED BLOOD CELLS URINE: 1 /HPF
SPECIFIC GRAVITY URINE: 1.02
UROBILINOGEN URINE: 0.2 MG/DL
WHITE BLOOD CELLS URINE: 0 /HPF

## 2025-01-09 LAB — BACTERIA UR CULT: NORMAL

## 2025-02-18 ENCOUNTER — APPOINTMENT (OUTPATIENT)
Facility: CLINIC | Age: 70
End: 2025-02-18

## 2025-02-20 ENCOUNTER — APPOINTMENT (OUTPATIENT)
Facility: CLINIC | Age: 70
End: 2025-02-20

## 2025-02-25 ENCOUNTER — APPOINTMENT (OUTPATIENT)
Dept: UROLOGY | Facility: CLINIC | Age: 70
End: 2025-02-25

## 2025-03-26 ENCOUNTER — APPOINTMENT (OUTPATIENT)
Dept: OBGYN | Facility: CLINIC | Age: 70
End: 2025-03-26